# Patient Record
Sex: FEMALE | Race: BLACK OR AFRICAN AMERICAN | Employment: UNEMPLOYED | URBAN - METROPOLITAN AREA
[De-identification: names, ages, dates, MRNs, and addresses within clinical notes are randomized per-mention and may not be internally consistent; named-entity substitution may affect disease eponyms.]

---

## 2017-03-16 ENCOUNTER — ED HISTORICAL/CONVERTED ENCOUNTER (OUTPATIENT)
Dept: OTHER | Age: 47
End: 2017-03-16

## 2017-04-03 ENCOUNTER — ED HISTORICAL/CONVERTED ENCOUNTER (OUTPATIENT)
Dept: OTHER | Age: 47
End: 2017-04-03

## 2020-07-25 ENCOUNTER — ED HISTORICAL/CONVERTED ENCOUNTER (OUTPATIENT)
Dept: OTHER | Age: 50
End: 2020-07-25

## 2021-03-02 ENCOUNTER — APPOINTMENT (OUTPATIENT)
Dept: GENERAL RADIOLOGY | Age: 51
End: 2021-03-02
Attending: FAMILY MEDICINE
Payer: MEDICARE

## 2021-03-02 ENCOUNTER — HOSPITAL ENCOUNTER (EMERGENCY)
Age: 51
Discharge: HOME | End: 2021-03-02
Attending: FAMILY MEDICINE
Payer: MEDICARE

## 2021-03-02 VITALS
DIASTOLIC BLOOD PRESSURE: 91 MMHG | SYSTOLIC BLOOD PRESSURE: 135 MMHG | BODY MASS INDEX: 21.19 KG/M2 | TEMPERATURE: 97.9 F | HEIGHT: 67 IN | HEART RATE: 91 BPM | WEIGHT: 135 LBS | OXYGEN SATURATION: 100 % | RESPIRATION RATE: 18 BRPM

## 2021-03-02 DIAGNOSIS — S80.01XA CONTUSION OF RIGHT KNEE, INITIAL ENCOUNTER: Primary | ICD-10-CM

## 2021-03-02 PROCEDURE — 99282 EMERGENCY DEPT VISIT SF MDM: CPT

## 2021-03-02 PROCEDURE — 73562 X-RAY EXAM OF KNEE 3: CPT

## 2021-03-02 RX ORDER — BISMUTH SUBSALICYLATE 262 MG
1 TABLET,CHEWABLE ORAL DAILY
COMMUNITY

## 2021-03-03 NOTE — ED PROVIDER NOTES
EMERGENCY DEPARTMENT HISTORY AND PHYSICAL EXAM      Date: 3/2/2021  Patient Name: Erik Sofia    History of Presenting Illness     Chief Complaint   Patient presents with    Knee Pain       History Provided By:     HPI: Erik Sofia, is an extremely pleasant 45 y.o. female presenting to the ED with a chief complaint of right knee pain. She states she has a history of arthritis and recently injured her knee. She states she tripped over one of her grandkids and fell down onto her right knee. Since this time she has had pain with walking. Its painful to flex her knee. She states it is a little bit swollen. She denies any other injuries. She did not hit her head. She otherwise feels well. There are no other complaints, changes, or physical findings at this time. PCP: Other, MD Dana    No current facility-administered medications on file prior to encounter. Current Outpatient Medications on File Prior to Encounter   Medication Sig Dispense Refill    multivitamin (ONE A DAY) tablet Take 1 Tab by mouth daily. Past History     Past Medical History:  Past Medical History:   Diagnosis Date    Ovarian cancer [facility])        Past Surgical History:  Past Surgical History:   Procedure Laterality Date    HX HYSTERECTOMY      HX OOPHORECTOMY         Family History:  No family history on file. Social History:  Social History     Tobacco Use    Smoking status: Current Every Day Smoker     Packs/day: 0.50    Smokeless tobacco: Never Used   Substance Use Topics    Alcohol use: Yes     Comment: occasional    Drug use: Yes     Types: Marijuana       Allergies:  No Known Allergies      Review of Systems       Review of Systems   Constitutional: Negative for activity change, appetite change, chills, fatigue and fever. HENT: Negative for congestion and sore throat. Eyes: Negative for photophobia and visual disturbance. Respiratory: Negative for cough, shortness of breath and wheezing. Cardiovascular: Negative for chest pain, palpitations and leg swelling. Gastrointestinal: Negative for abdominal pain, diarrhea, nausea and vomiting. Endocrine: Negative for cold intolerance and heat intolerance. Musculoskeletal: Positive for gait problem and joint swelling. Right knee pain   Skin: Negative for color change and rash. Neurological: Negative for dizziness and headaches. Physical Exam       Physical Exam  Constitutional:       Appearance: She is well-developed. HENT:      Head: Normocephalic and atraumatic. Mouth/Throat:      Mouth: Mucous membranes are moist.      Pharynx: Oropharynx is clear. Eyes:      Conjunctiva/sclera: Conjunctivae normal.      Pupils: Pupils are equal, round, and reactive to light. Neck:      Musculoskeletal: Normal range of motion and neck supple. Cardiovascular:      Rate and Rhythm: Normal rate and regular rhythm. Heart sounds: No murmur. Pulmonary:      Effort: No respiratory distress. Breath sounds: No stridor. No wheezing, rhonchi or rales. Abdominal:      General: There is no distension. Tenderness: There is no abdominal tenderness. There is no rebound. Musculoskeletal:      Comments: Decreased range of motion at the right knee secondary to pain. She does have tenderness over the patella and the proximal tibia. ACL and PCL testing negative. Skin:     General: Skin is warm and dry. Neurological:      General: No focal deficit present. Mental Status: She is alert and oriented to person, place, and time. Psychiatric:         Mood and Affect: Mood normal.         Behavior: Behavior normal.         Lab and Diagnostic Study Results     Labs -   No results found for this or any previous visit (from the past 12 hour(s)).     Radiologic Studies -   @lastxrresult@  CT Results  (Last 48 hours)    None        CXR Results  (Last 48 hours)    None            Medical Decision Making   - I am the first provider for this patient. - I reviewed the vital signs, available nursing notes, past medical history, past surgical history, family history and social history. - Initial assessment performed. The patients presenting problems have been discussed, and they are in agreement with the care plan formulated and outlined with them. I have encouraged them to ask questions as they arise throughout their visit. Vital Signs-Reviewed the patient's vital signs. Patient Vitals for the past 12 hrs:   Temp Pulse Resp BP SpO2   03/02/21 1918 97.9 °F (36.6 °C) 91 18 (!) 135/91 100 %         ED Course/ Provider Notes (Medical Decision Making):     Patient presented to the emergency department with a chief complaint of right knee pain. X-ray showed no acute fracture or dislocation. She was discharged home with supportive measures for knee contusion            Ashley Chapman was given a thorough list of signs and symptoms that would warrant an immediate return to the emergency department. Otherwise Ashley Chapman will follow up with PCP. Procedures   Medical Decision Makingedical Decision Making  Performed by: Elie Prabhakar DO  Procedures  None       Disposition   Disposition:     Home       All of the diagnostic tests were reviewed and questions answered. Diagnosis, care plan and treatment options were discussed. The patient understands the instructions and will follow up as directed. The patients results have been reviewed with them. They have been counseled regarding their diagnosis. The patient verbally convey understanding and agreement of the signs, symptoms, diagnosis, treatment and prognosis and additionally agrees to follow up as recommended with their PCP in 24 - 48 hours. They also agree with the care-plan and convey that all of their questions have been answered.   I have also put together some discharge instructions for them that include: 1) educational information regarding their diagnosis, 2) how to care for their diagnosis at home, as well a 3) list of reasons why they would want to return to the ED prior to their follow-up appointment, should their condition change. DISCHARGE PLAN:    1. Current Discharge Medication List      CONTINUE these medications which have NOT CHANGED    Details   multivitamin (ONE A DAY) tablet Take 1 Tab by mouth daily. 2.   Follow-up Information     Follow up With Specialties Details Why Contact Info    PCP                3.  Return to ED if worse       4. Current Discharge Medication List            Diagnosis     Clinical Impression:      1. Contusion of right knee, initial encounter        Attestations:    Ngozi Gonzalez, DO    Please note that this dictation was completed with [employer], the computer voice recognition software. Quite often unanticipated grammatical, syntax, homophones, and other interpretive errors are inadvertently transcribed by the computer software. Please disregard these errors. Please excuse any errors that have escaped final proofreading. Thank you.

## 2021-03-03 NOTE — DISCHARGE INSTRUCTIONS
Thank you! Thank you for allowing me to care for you in the emergency department. I sincerely hope that you are satisfied with your visit today. It is my goal to provide you with excellent care. Below you will find a list of your labs and imaging from your visit today. Should you have any questions regarding these results please do not hesitate to call the emergency department. Labs -   No results found for this or any previous visit (from the past 12 hour(s)). Radiologic Studies -   XR KNEE RT 3 V   Final Result   Negative bony examination right knee. CT Results  (Last 48 hours)      None          CXR Results  (Last 48 hours)      None               If you feel that you have not received excellent quality care or timely care, please ask to speak to the nurse manager. Please choose us in the future for your continued health care needs. ------------------------------------------------------------------------------------------------------------  The exam and treatment you received in the Emergency Department were for an urgent problem and are not intended as complete care. It is important that you follow-up with a doctor, nurse practitioner, or physician assistant to:  (1) confirm your diagnosis,  (2) re-evaluation of changes in your illness and treatment, and  (3) for ongoing care. If your symptoms become worse or you do not improve as expected and you are unable to reach your usual health care provider, you should return to the Emergency Department. We are available 24 hours a day. Please take your discharge instructions with you when you go to your follow-up appointment. If you have any problem arranging a follow-up appointment, contact the Emergency Department immediately. If a prescription has been provided, please have it filled as soon as possible to prevent a delay in treatment. Read the entire medication instruction sheet provided to you by the pharmacy.  If you have any questions or reservations about taking the medication due to side effects or interactions with other medications, please call your primary care physician or contact the ER to speak with the charge nurse. Make an appointment with your family doctor or the physician you were referred to for follow-up of this visit as instructed on your discharge paperwork, as this is a mandatory follow-up. Return to the ER if you are unable to be seen or if you are unable to be seen in a timely manner. If you have any problem arranging the follow-up visit, contact the Emergency Department immediately.

## 2021-03-03 NOTE — ED TRIAGE NOTES
Pt states fell tripping over grand daughter 3 days ago, having right knee pain and swelling reported

## 2022-06-01 ENCOUNTER — HOSPITAL ENCOUNTER (EMERGENCY)
Age: 52
Discharge: HOME | DRG: 247 | End: 2022-06-01
Attending: EMERGENCY MEDICINE
Payer: MEDICAID

## 2022-06-01 ENCOUNTER — APPOINTMENT (OUTPATIENT)
Dept: GENERAL RADIOLOGY | Age: 52
DRG: 247 | End: 2022-06-01
Attending: EMERGENCY MEDICINE
Payer: MEDICAID

## 2022-06-01 VITALS
DIASTOLIC BLOOD PRESSURE: 86 MMHG | TEMPERATURE: 98 F | OXYGEN SATURATION: 98 % | BODY MASS INDEX: 21.19 KG/M2 | HEART RATE: 86 BPM | WEIGHT: 135 LBS | SYSTOLIC BLOOD PRESSURE: 116 MMHG | RESPIRATION RATE: 18 BRPM | HEIGHT: 67 IN

## 2022-06-01 DIAGNOSIS — N39.0 URINARY TRACT INFECTION WITHOUT HEMATURIA, SITE UNSPECIFIED: ICD-10-CM

## 2022-06-01 DIAGNOSIS — K52.9 ENTERITIS: Primary | ICD-10-CM

## 2022-06-01 LAB
ALBUMIN SERPL-MCNC: 4.4 G/DL (ref 3.5–5)
ALBUMIN/GLOB SERPL: 1.2 {RATIO} (ref 1.1–2.2)
ALP SERPL-CCNC: 98 U/L (ref 45–117)
ALT SERPL-CCNC: 21 U/L (ref 12–78)
ANION GAP SERPL CALC-SCNC: 9 MMOL/L (ref 5–15)
APPEARANCE UR: ABNORMAL
AST SERPL W P-5'-P-CCNC: 16 U/L (ref 15–37)
BACTERIA URNS QL MICRO: ABNORMAL /HPF
BASOPHILS # BLD: 0 K/UL (ref 0–0.1)
BASOPHILS NFR BLD: 1 % (ref 0–1)
BILIRUB SERPL-MCNC: 0.9 MG/DL (ref 0.2–1)
BILIRUB UR QL: NEGATIVE
BUN SERPL-MCNC: 16 MG/DL (ref 6–20)
BUN/CREAT SERPL: 16 (ref 12–20)
CA-I BLD-MCNC: 11.1 MG/DL (ref 8.5–10.1)
CHLORIDE SERPL-SCNC: 101 MMOL/L (ref 97–108)
CO2 SERPL-SCNC: 29 MMOL/L (ref 21–32)
COLOR UR: ABNORMAL
CREAT SERPL-MCNC: 0.97 MG/DL (ref 0.55–1.02)
DIFFERENTIAL METHOD BLD: ABNORMAL
EOSINOPHIL # BLD: 0.1 K/UL (ref 0–0.4)
EOSINOPHIL NFR BLD: 1 % (ref 0–7)
EPITH CASTS URNS QL MICRO: ABNORMAL /LPF
ERYTHROCYTE [DISTWIDTH] IN BLOOD BY AUTOMATED COUNT: 12.5 % (ref 11.5–14.5)
FLUAV AG NPH QL IA: NEGATIVE
FLUBV AG NOSE QL IA: NEGATIVE
GLOBULIN SER CALC-MCNC: 3.7 G/DL (ref 2–4)
GLUCOSE SERPL-MCNC: 103 MG/DL (ref 65–100)
GLUCOSE UR STRIP.AUTO-MCNC: NEGATIVE MG/DL
HCT VFR BLD AUTO: 48.7 % (ref 35–47)
HGB BLD-MCNC: 16 G/DL (ref 11.5–16)
HGB UR QL STRIP: ABNORMAL
IMM GRANULOCYTES # BLD AUTO: 0 K/UL (ref 0–0.04)
IMM GRANULOCYTES NFR BLD AUTO: 0 % (ref 0–0.5)
KETONES UR QL STRIP.AUTO: 15 MG/DL
LACTATE SERPL-SCNC: 0.7 MMOL/L (ref 0.4–2)
LEUKOCYTE ESTERASE UR QL STRIP.AUTO: ABNORMAL
LIPASE SERPL-CCNC: 73 U/L (ref 73–393)
LYMPHOCYTES # BLD: 2 K/UL (ref 0.8–3.5)
LYMPHOCYTES NFR BLD: 32 % (ref 12–49)
MCH RBC QN AUTO: 32.1 PG (ref 26–34)
MCHC RBC AUTO-ENTMCNC: 32.9 G/DL (ref 30–36.5)
MCV RBC AUTO: 97.6 FL (ref 80–99)
MONOCYTES # BLD: 0.6 K/UL (ref 0–1)
MONOCYTES NFR BLD: 10 % (ref 5–13)
MUCOUS THREADS URNS QL MICRO: ABNORMAL /LPF
NEUTS SEG # BLD: 3.6 K/UL (ref 1.8–8)
NEUTS SEG NFR BLD: 56 % (ref 32–75)
NITRITE UR QL STRIP.AUTO: NEGATIVE
PH UR STRIP: 5 [PH] (ref 5–8)
PLATELET # BLD AUTO: 169 K/UL (ref 150–400)
PMV BLD AUTO: 10.9 FL (ref 8.9–12.9)
POTASSIUM SERPL-SCNC: 3.9 MMOL/L (ref 3.5–5.1)
PROT SERPL-MCNC: 8.1 G/DL (ref 6.4–8.2)
PROT UR STRIP-MCNC: NEGATIVE MG/DL
RBC # BLD AUTO: 4.99 M/UL (ref 3.8–5.2)
RBC #/AREA URNS HPF: ABNORMAL /HPF (ref 0–5)
SODIUM SERPL-SCNC: 139 MMOL/L (ref 136–145)
SP GR UR REFRACTOMETRY: 1.02 (ref 1–1.03)
TRICHOMONAS UR QL MICRO: PRESENT
UROBILINOGEN UR QL STRIP.AUTO: 1 EU/DL (ref 0.2–1)
WBC # BLD AUTO: 6.4 K/UL (ref 3.6–11)
WBC URNS QL MICRO: ABNORMAL /HPF (ref 0–4)

## 2022-06-01 PROCEDURE — 96374 THER/PROPH/DIAG INJ IV PUSH: CPT

## 2022-06-01 PROCEDURE — 83690 ASSAY OF LIPASE: CPT

## 2022-06-01 PROCEDURE — 74011000250 HC RX REV CODE- 250: Performed by: EMERGENCY MEDICINE

## 2022-06-01 PROCEDURE — 99284 EMERGENCY DEPT VISIT MOD MDM: CPT

## 2022-06-01 PROCEDURE — 81001 URINALYSIS AUTO W/SCOPE: CPT

## 2022-06-01 PROCEDURE — 74011250636 HC RX REV CODE- 250/636: Performed by: EMERGENCY MEDICINE

## 2022-06-01 PROCEDURE — 83605 ASSAY OF LACTIC ACID: CPT

## 2022-06-01 PROCEDURE — 74022 RADEX COMPL AQT ABD SERIES: CPT

## 2022-06-01 PROCEDURE — C9113 INJ PANTOPRAZOLE SODIUM, VIA: HCPCS | Performed by: EMERGENCY MEDICINE

## 2022-06-01 PROCEDURE — 80053 COMPREHEN METABOLIC PANEL: CPT

## 2022-06-01 PROCEDURE — 87804 INFLUENZA ASSAY W/OPTIC: CPT

## 2022-06-01 PROCEDURE — 85025 COMPLETE CBC W/AUTO DIFF WBC: CPT

## 2022-06-01 RX ORDER — VENLAFAXINE HYDROCHLORIDE 37.5 MG/1
37.5 CAPSULE, EXTENDED RELEASE ORAL DAILY
COMMUNITY
Start: 2022-01-19

## 2022-06-01 RX ORDER — SULFAMETHOXAZOLE AND TRIMETHOPRIM 800; 160 MG/1; MG/1
1 TABLET ORAL 2 TIMES DAILY
Qty: 14 TABLET | Refills: 0 | Status: SHIPPED | OUTPATIENT
Start: 2022-06-01 | End: 2022-06-12

## 2022-06-01 RX ORDER — OMEPRAZOLE 20 MG/1
20 CAPSULE, DELAYED RELEASE ORAL DAILY
Qty: 10 CAPSULE | Refills: 0 | Status: SHIPPED | OUTPATIENT
Start: 2022-06-01

## 2022-06-01 RX ORDER — ONDANSETRON 4 MG/1
4 TABLET, FILM COATED ORAL
Qty: 10 TABLET | Refills: 0 | Status: SHIPPED | OUTPATIENT
Start: 2022-06-01 | End: 2022-07-24

## 2022-06-01 RX ORDER — POLYETHYLENE GLYCOL 3350, SODIUM SULFATE ANHYDROUS, SODIUM BICARBONATE, SODIUM CHLORIDE, POTASSIUM CHLORIDE 236; 22.74; 6.74; 5.86; 2.97 G/4L; G/4L; G/4L; G/4L; G/4L
POWDER, FOR SOLUTION ORAL
COMMUNITY
Start: 2022-03-30 | End: 2022-06-12

## 2022-06-01 RX ORDER — GABAPENTIN 100 MG/1
100 CAPSULE ORAL DAILY
COMMUNITY
Start: 2022-01-19

## 2022-06-01 RX ORDER — AMLODIPINE BESYLATE 5 MG/1
1 TABLET ORAL DAILY
COMMUNITY
Start: 2022-04-04

## 2022-06-01 RX ADMIN — SODIUM CHLORIDE 1000 ML: 9 INJECTION, SOLUTION INTRAVENOUS at 10:35

## 2022-06-01 RX ADMIN — SODIUM CHLORIDE, PRESERVATIVE FREE 40 MG: 5 INJECTION INTRAVENOUS at 11:20

## 2022-06-01 NOTE — ED TRIAGE NOTES
\" I started with some abdominal pain Sunday evening, and then have been throwing up off and on Monday and Tuesday, no vomiting today, but cramping pain in the belly, and have not been able to eat \"

## 2022-06-01 NOTE — ED PROVIDER NOTES
EMERGENCY DEPARTMENT HISTORY AND PHYSICAL EXAM      Date: 6/1/2022  Patient Name: Candy Galo      History of Presenting Illness     Chief Complaint   Patient presents with    Abdominal Pain    Dizziness    Vomiting    Fatigue       History Provided By: Patient    HPI: Candy Galo, 45 y.o. female with a past medical history significant hypertension presents to the ED with cc of Abdominal pain, NV started 3 days ago. No fever, chills, hematemesis or hematochezia. LBM 3 days ago was small but normal. No nausea or vomiting today. There are no other complaints, changes, or physical findings at this time. PCP: Dana Pulido MD    Current Outpatient Medications   Medication Sig Dispense Refill    amLODIPine (NORVASC) 5 mg tablet Take 1 Tablet by mouth daily.  gabapentin (NEURONTIN) 100 mg capsule Take 100 mg by mouth daily.  venlafaxine-SR (EFFEXOR-XR) 37.5 mg capsule Take 37.5 mg by mouth daily.  PEG 3350-Electrolytes (GO-LYTELY) 236-22.74-6.74 -5.86 gram suspension Follow [facility] written instructions      trimethoprim-sulfamethoxazole (Bactrim DS) 160-800 mg per tablet Take 1 Tablet by mouth two (2) times a day for 7 days. 14 Tablet 0    ondansetron hcl (ZOFRAN) 4 mg tablet Take 1 Tablet by mouth every eight (8) hours as needed for Nausea, Vomiting or Nausea or Vomiting. 10 Tablet 0    omeprazole (PRILOSEC) 20 mg capsule Take 1 Capsule by mouth daily. 10 Capsule 0    multivitamin (ONE A DAY) tablet Take 1 Tab by mouth daily. Past History     Past Medical History:  Past Medical History:   Diagnosis Date    Hypertension     Ovarian cancer ([facility] [address]        Past Surgical History:  Past Surgical History:   Procedure Laterality Date    HX HYSTERECTOMY      HX OOPHORECTOMY         Family History:  History reviewed. No pertinent family history.     Social History:  Social History     Tobacco Use    Smoking status: Current Every Day Smoker     Packs/day: 0.50    Smokeless tobacco: Never Used   Substance Use Topics    Alcohol use: Yes     Comment: occasional    Drug use: Yes     Types: Marijuana     Comment: occasionally       Allergies:  No Known Allergies      Review of Systems     Review of Systems   Constitutional: Negative. HENT: Negative. Respiratory: Negative. Cardiovascular: Negative. Gastrointestinal: Positive for abdominal pain, nausea and vomiting. Genitourinary: Negative. Musculoskeletal: Negative. Neurological: Negative. All other systems reviewed and are negative. Physical Exam     Physical Exam  Vitals and nursing note reviewed. Constitutional:       Appearance: She is well-developed. HENT:      Head: Normocephalic. Cardiovascular:      Heart sounds: Normal heart sounds. Pulmonary:      Effort: Pulmonary effort is normal.   Abdominal:      General: Abdomen is flat and scaphoid. Palpations: Abdomen is soft. Tenderness: There is abdominal tenderness in the epigastric area. Neurological:      General: No focal deficit present. Mental Status: She is alert and oriented to person, place, and time. Lab and Diagnostic Study Results     Labs -     Recent Results (from the past 12 hour(s))   CBC WITH AUTOMATED DIFF    Collection Time: 06/01/22 10:35 AM   Result Value Ref Range    WBC 6.4 3.6 - 11.0 K/uL    RBC 4.99 3.80 - 5.20 M/uL    HGB 16.0 11.5 - 16.0 g/dL    HCT 48.7 (H) 35.0 - 47.0 %    MCV 97.6 80.0 - 99.0 FL    MCH 32.1 26.0 - 34.0 PG    MCHC 32.9 30.0 - 36.5 g/dL    RDW 12.5 11.5 - 14.5 %    PLATELET 628 937 - 472 K/uL    MPV 10.9 8.9 - 12.9 FL    NEUTROPHILS 56 32 - 75 %    LYMPHOCYTES 32 12 - 49 %    MONOCYTES 10 5 - 13 %    EOSINOPHILS 1 0 - 7 %    BASOPHILS 1 0 - 1 %    IMMATURE GRANULOCYTES 0 0.0 - 0.5 %    ABS. NEUTROPHILS 3.6 1.8 - 8.0 K/UL    ABS. LYMPHOCYTES 2.0 0.8 - 3.5 K/UL    ABS. MONOCYTES 0.6 0.0 - 1.0 K/UL    ABS. EOSINOPHILS 0.1 0.0 - 0.4 K/UL    ABS. BASOPHILS 0.0 0.0 - 0.1 K/UL    ABS. IMM. GRANS. 0.0 0.00 - 0.04 K/UL    DF AUTOMATED     METABOLIC PANEL, COMPREHENSIVE    Collection Time: 06/01/22 10:35 AM   Result Value Ref Range    Sodium 139 136 - 145 mmol/L    Potassium 3.9 3.5 - 5.1 mmol/L    Chloride 101 97 - 108 mmol/L    CO2 29 21 - 32 mmol/L    Anion gap 9 5 - 15 mmol/L    Glucose 103 (H) 65 - 100 mg/dL    BUN 16 6 - 20 mg/dL    Creatinine 0.97 0.55 - 1.02 mg/dL    BUN/Creatinine ratio 16 12 - 20      GFR est AA >60 >60 ml/min/1.73m2    GFR est non-AA >60 >60 ml/min/1.73m2    Calcium 11.1 (H) 8.5 - 10.1 mg/dL    Bilirubin, total 0.9 0.2 - 1.0 mg/dL    AST (SGOT) 16 15 - 37 U/L    ALT (SGPT) 21 12 - 78 U/L    Alk.  phosphatase 98 45 - 117 U/L    Protein, total 8.1 6.4 - 8.2 g/dL    Albumin 4.4 3.5 - 5.0 g/dL    Globulin 3.7 2.0 - 4.0 g/dL    A-G Ratio 1.2 1.1 - 2.2     INFLUENZA A & B AG (RAPID TEST)    Collection Time: 06/01/22 10:35 AM   Result Value Ref Range    Influenza A Antigen Negative Negative      Influenza B Antigen Negative Negative     LIPASE    Collection Time: 06/01/22 11:30 AM   Result Value Ref Range    Lipase 73 73 - 393 U/L   LACTIC ACID    Collection Time: 06/01/22 11:30 AM   Result Value Ref Range    Lactic acid 0.7 0.4 - 2.0 mmol/L   URINALYSIS W/ RFLX MICROSCOPIC    Collection Time: 06/01/22 11:50 AM   Result Value Ref Range    Color Dark Yellow      Appearance Hazy (A) Clear      Specific gravity 1.020 1.003 - 1.030      pH (UA) 5.0 5.0 - 8.0      Protein Negative Negative mg/dL    Glucose Negative Negative mg/dL    Ketone 15 (A) Negative mg/dL    Bilirubin Negative Negative      Blood Trace (A) Negative      Urobilinogen 1.0 0.2 - 1.0 EU/dL    Nitrites Negative Negative      Leukocyte Esterase Large (A) Negative     URINE MICROSCOPIC    Collection Time: 06/01/22 11:50 AM   Result Value Ref Range    WBC 5-10 0 - 4 /hpf    RBC 0-5 0 - 5 /hpf    Epithelial cells Moderate (A) Few /lpf    Bacteria 1+ (A) Negative /hpf    Mucus 2+ (A) Negative /lpf    Trichomonas Present Radiologic Studies -   [unfilled]  CT Results  (Last 48 hours)    None        CXR Results  (Last 48 hours)               06/01/22 1146  XR ABD ACUTE W 1 V CHEST Final result    Narrative:  Abdomen series and chest, 3 views       Staple central abdomen, correlate to prior surgery. Bowel gas pattern unremarkable. On upright imaging, there is no free intraperitoneal air. Single view of the chest accompanying this study appears unremarkable. Medical Decision Making and ED Course   - I am the first and primary provider for this patient AND AM THE PRIMARY PROVIDER OF RECORD. - I reviewed the vital signs, available nursing notes, past medical history, past surgical history, family history and social history. - Initial assessment performed. The patients presenting problems have been discussed, and the staff are in agreement with the care plan formulated and outlined with them. I have encouraged them to ask questions as they arise throughout their visit. Vital Signs-Reviewed the patient's vital signs. Patient Vitals for the past 12 hrs:   Temp Pulse Resp BP SpO2   06/01/22 0947 98 °F (36.7 °C) 86 18 116/86 98 %           Records Reviewed: Nursing Notes    The patient presents with     ED Course:              Provider Notes (Medical Decision Making):     MDM  Number of Diagnoses or Management Options  Risk of Complications, Morbidity, and/or Mortality  General comments: CT scanner is down. I offered to have her transported to the main radiology for CT scan and back here but she refused. She greed to plain Xrays only. She may change her mind anytime she likes.                Consultations:       Consultations:         Procedures and Critical Care       Performed by: Lucho Ramos MD  PROCEDURES:  Procedures                     Disposition     Disposition: [facility] Discharge: I informed the pt that they needed further workup CT scan for adequate evaluation for their Abd pain and that by refusing the above, they at risk for further deterioration. They are awake, alert, and they understand their condition and the risks involved in leaving. They are clinically aware of their surroundings and able to ask appropriate questions. The patient and the nurse present confirms They are not clinically intoxicated and able to make medical decisions. They have verbalized knowing the risks and understood it was recommended that they stay and could also return at any time. The patient' was present for the discussion and also verbalized that they understood both diagnosis, risks and could return at any time. They were both provided with warnings regarding worsening of Their condition and were provided instructions to follow up with their PCP tomorrow or return to the Emergency Room as soon as possible. This discussion was witnessed by nurse RN. Discharged    Remove if not discharged  DISCHARGE PLAN:  1. Current Discharge Medication List      CONTINUE these medications which have NOT CHANGED    Details   amLODIPine (NORVASC) 5 mg tablet Take 1 Tablet by mouth daily. gabapentin (NEURONTIN) 100 mg capsule Take 100 mg by mouth daily. venlafaxine-SR (EFFEXOR-XR) 37.5 mg capsule Take 37.5 mg by mouth daily. PEG 3350-Electrolytes (GO-LYTELY) 236-22.74-6.74 -5.86 gram suspension [facility]      multivitamin (ONE A DAY) tablet Take 1 Tab by mouth daily. 2.   Follow-up Information     Follow up With Specialties Details Why Contact Info    Tory Hill MD Internal Medicine Physician In 1 week  [address] [facility]  [phone]          3. Return to ED if worse   4. Current Discharge Medication List      START taking these medications    Details   trimethoprim-sulfamethoxazole (Bactrim DS) 160-800 mg per tablet Take 1 Tablet by mouth two (2) times a day for 7 days.   Qty: 14 Tablet, Refills: 0  Start date: 6/1/2022, End date: 6/8/2022 ondansetron hcl (ZOFRAN) 4 mg tablet Take 1 Tablet by mouth every eight (8) hours as needed for Nausea, Vomiting or Nausea or Vomiting. Qty: 10 Tablet, Refills: 0  Start date: 6/1/2022      omeprazole (PRILOSEC) 20 mg capsule Take 1 Capsule by mouth daily. Qty: 10 Capsule, Refills: 0  Start date: 6/1/2022             Diagnosis     Clinical Impression:   1. Enteritis    2. Urinary tract infection without hematuria, site unspecified        Attestations:    Nhan Alonzo MD    Please note that this dictation was completed with [employer], the computer voice recognition software. Quite often unanticipated grammatical, syntax, homophones, and other interpretive errors are inadvertently transcribed by the computer software. Please disregard these errors. Please excuse any errors that have escaped final proofreading. Thank you.

## 2022-06-04 ENCOUNTER — HOSPITAL ENCOUNTER (INPATIENT)
Age: 52
LOS: 8 days | Discharge: HOME | DRG: 247 | End: 2022-06-12
Attending: EMERGENCY MEDICINE | Admitting: INTERNAL MEDICINE
Payer: MEDICAID

## 2022-06-04 ENCOUNTER — APPOINTMENT (OUTPATIENT)
Dept: CT IMAGING | Age: 52
DRG: 247 | End: 2022-06-04
Attending: EMERGENCY MEDICINE
Payer: MEDICAID

## 2022-06-04 DIAGNOSIS — K56.609 SBO (SMALL BOWEL OBSTRUCTION) (HCC): Primary | ICD-10-CM

## 2022-06-04 DIAGNOSIS — R10.10 PAIN OF UPPER ABDOMEN: ICD-10-CM

## 2022-06-04 DIAGNOSIS — N39.0 URINARY TRACT INFECTION WITHOUT HEMATURIA, SITE UNSPECIFIED: ICD-10-CM

## 2022-06-04 DIAGNOSIS — C56.9 MALIGNANT NEOPLASM OF OVARY, UNSPECIFIED LATERALITY (HCC): ICD-10-CM

## 2022-06-04 DIAGNOSIS — A59.01 TRICHOMONAS VAGINITIS: ICD-10-CM

## 2022-06-04 LAB
ALBUMIN SERPL-MCNC: 5 G/DL (ref 3.5–5)
ALBUMIN/GLOB SERPL: 1.2 {RATIO} (ref 1.1–2.2)
ALP SERPL-CCNC: 113 U/L (ref 45–117)
ALT SERPL-CCNC: 23 U/L (ref 12–78)
ANION GAP SERPL CALC-SCNC: 11 MMOL/L (ref 5–15)
APPEARANCE UR: ABNORMAL
AST SERPL W P-5'-P-CCNC: 16 U/L (ref 15–37)
BACTERIA URNS QL MICRO: ABNORMAL /HPF
BASOPHILS # BLD: 0 K/UL (ref 0–0.1)
BASOPHILS NFR BLD: 0 % (ref 0–1)
BILIRUB SERPL-MCNC: 0.8 MG/DL (ref 0.2–1)
BILIRUB UR QL: ABNORMAL
BUN SERPL-MCNC: 14 MG/DL (ref 6–20)
BUN/CREAT SERPL: 13 (ref 12–20)
CA-I BLD-MCNC: 11.2 MG/DL (ref 8.5–10.1)
CHLORIDE SERPL-SCNC: 99 MMOL/L (ref 97–108)
CO2 SERPL-SCNC: 28 MMOL/L (ref 21–32)
COLOR UR: ABNORMAL
CREAT SERPL-MCNC: 1.07 MG/DL (ref 0.55–1.02)
DIFFERENTIAL METHOD BLD: ABNORMAL
EOSINOPHIL # BLD: 0 K/UL (ref 0–0.4)
EOSINOPHIL NFR BLD: 1 % (ref 0–7)
EPITH CASTS URNS QL MICRO: ABNORMAL /LPF
ERYTHROCYTE [DISTWIDTH] IN BLOOD BY AUTOMATED COUNT: 12.1 % (ref 11.5–14.5)
GLOBULIN SER CALC-MCNC: 4.2 G/DL (ref 2–4)
GLUCOSE SERPL-MCNC: 111 MG/DL (ref 65–100)
GLUCOSE UR STRIP.AUTO-MCNC: NEGATIVE MG/DL
HCT VFR BLD AUTO: 50.2 % (ref 35–47)
HGB BLD-MCNC: 16.9 G/DL (ref 11.5–16)
HGB UR QL STRIP: ABNORMAL
IMM GRANULOCYTES # BLD AUTO: 0 K/UL (ref 0–0.04)
IMM GRANULOCYTES NFR BLD AUTO: 0 % (ref 0–0.5)
KETONES UR QL STRIP.AUTO: 15 MG/DL
LACTATE SERPL-SCNC: 1.2 MMOL/L (ref 0.4–2)
LEUKOCYTE ESTERASE UR QL STRIP.AUTO: ABNORMAL
LYMPHOCYTES # BLD: 1.9 K/UL (ref 0.8–3.5)
LYMPHOCYTES NFR BLD: 34 % (ref 12–49)
MCH RBC QN AUTO: 32 PG (ref 26–34)
MCHC RBC AUTO-ENTMCNC: 33.7 G/DL (ref 30–36.5)
MCV RBC AUTO: 95.1 FL (ref 80–99)
MONOCYTES # BLD: 1.1 K/UL (ref 0–1)
MONOCYTES NFR BLD: 19 % (ref 5–13)
NEUTS SEG # BLD: 2.6 K/UL (ref 1.8–8)
NEUTS SEG NFR BLD: 46 % (ref 32–75)
NITRITE UR QL STRIP.AUTO: NEGATIVE
PH UR STRIP: 5 [PH] (ref 5–8)
PLATELET # BLD AUTO: 190 K/UL (ref 150–400)
PMV BLD AUTO: 11.2 FL (ref 8.9–12.9)
POTASSIUM SERPL-SCNC: 4.2 MMOL/L (ref 3.5–5.1)
PROT SERPL-MCNC: 9.2 G/DL (ref 6.4–8.2)
PROT UR STRIP-MCNC: 30 MG/DL
RBC # BLD AUTO: 5.28 M/UL (ref 3.8–5.2)
RBC #/AREA URNS HPF: ABNORMAL /HPF (ref 0–5)
SODIUM SERPL-SCNC: 138 MMOL/L (ref 136–145)
SP GR UR REFRACTOMETRY: 1.02 (ref 1–1.03)
UA: UC IF INDICATED,UAUC: ABNORMAL
UROBILINOGEN UR QL STRIP.AUTO: 4 EU/DL (ref 0.2–1)
WBC # BLD AUTO: 5.7 K/UL (ref 3.6–11)
WBC URNS QL MICRO: ABNORMAL /HPF (ref 0–4)

## 2022-06-04 PROCEDURE — 99222 1ST HOSP IP/OBS MODERATE 55: CPT | Performed by: SURGERY

## 2022-06-04 PROCEDURE — 96374 THER/PROPH/DIAG INJ IV PUSH: CPT

## 2022-06-04 PROCEDURE — 74011250636 HC RX REV CODE- 250/636: Performed by: INTERNAL MEDICINE

## 2022-06-04 PROCEDURE — 74011250636 HC RX REV CODE- 250/636: Performed by: EMERGENCY MEDICINE

## 2022-06-04 PROCEDURE — 99285 EMERGENCY DEPT VISIT HI MDM: CPT

## 2022-06-04 PROCEDURE — 83605 ASSAY OF LACTIC ACID: CPT

## 2022-06-04 PROCEDURE — 74011000636 HC RX REV CODE- 636: Performed by: EMERGENCY MEDICINE

## 2022-06-04 PROCEDURE — 81001 URINALYSIS AUTO W/SCOPE: CPT

## 2022-06-04 PROCEDURE — 74011250636 HC RX REV CODE- 250/636: Performed by: SURGERY

## 2022-06-04 PROCEDURE — 85025 COMPLETE CBC W/AUTO DIFF WBC: CPT

## 2022-06-04 PROCEDURE — 87086 URINE CULTURE/COLONY COUNT: CPT

## 2022-06-04 PROCEDURE — 80053 COMPREHEN METABOLIC PANEL: CPT

## 2022-06-04 PROCEDURE — 74177 CT ABD & PELVIS W/CONTRAST: CPT

## 2022-06-04 PROCEDURE — 74011000250 HC RX REV CODE- 250: Performed by: INTERNAL MEDICINE

## 2022-06-04 PROCEDURE — 65270000029 HC RM PRIVATE

## 2022-06-04 PROCEDURE — 0DH67UZ INSERTION OF FEEDING DEVICE INTO STOMACH, VIA NATURAL OR ARTIFICIAL OPENING: ICD-10-PCS | Performed by: EMERGENCY MEDICINE

## 2022-06-04 RX ORDER — ACETAMINOPHEN 650 MG/1
650 SUPPOSITORY RECTAL
Status: DISCONTINUED | OUTPATIENT
Start: 2022-06-04 | End: 2022-06-12 | Stop reason: HOSPADM

## 2022-06-04 RX ORDER — SODIUM CHLORIDE 0.9 % (FLUSH) 0.9 %
5-40 SYRINGE (ML) INJECTION EVERY 8 HOURS
Status: DISCONTINUED | OUTPATIENT
Start: 2022-06-04 | End: 2022-06-12 | Stop reason: HOSPADM

## 2022-06-04 RX ORDER — ONDANSETRON 2 MG/ML
4 INJECTION INTRAMUSCULAR; INTRAVENOUS
Status: DISCONTINUED | OUTPATIENT
Start: 2022-06-04 | End: 2022-06-12 | Stop reason: HOSPADM

## 2022-06-04 RX ORDER — VENLAFAXINE HYDROCHLORIDE 37.5 MG/1
37.5 CAPSULE, EXTENDED RELEASE ORAL DAILY
Status: DISCONTINUED | OUTPATIENT
Start: 2022-06-05 | End: 2022-06-12 | Stop reason: HOSPADM

## 2022-06-04 RX ORDER — SODIUM CHLORIDE 0.9 % (FLUSH) 0.9 %
5-40 SYRINGE (ML) INJECTION AS NEEDED
Status: DISCONTINUED | OUTPATIENT
Start: 2022-06-04 | End: 2022-06-12 | Stop reason: HOSPADM

## 2022-06-04 RX ORDER — ENOXAPARIN SODIUM 100 MG/ML
40 INJECTION SUBCUTANEOUS DAILY
Status: DISCONTINUED | OUTPATIENT
Start: 2022-06-05 | End: 2022-06-12 | Stop reason: HOSPADM

## 2022-06-04 RX ORDER — ONDANSETRON 4 MG/1
4 TABLET, ORALLY DISINTEGRATING ORAL
Status: DISCONTINUED | OUTPATIENT
Start: 2022-06-04 | End: 2022-06-12 | Stop reason: HOSPADM

## 2022-06-04 RX ORDER — ACETAMINOPHEN 325 MG/1
650 TABLET ORAL
Status: DISCONTINUED | OUTPATIENT
Start: 2022-06-04 | End: 2022-06-12 | Stop reason: HOSPADM

## 2022-06-04 RX ORDER — ONDANSETRON 2 MG/ML
4 INJECTION INTRAMUSCULAR; INTRAVENOUS
Status: COMPLETED | OUTPATIENT
Start: 2022-06-04 | End: 2022-06-04

## 2022-06-04 RX ORDER — AMLODIPINE BESYLATE 5 MG/1
5 TABLET ORAL DAILY
Status: DISCONTINUED | OUTPATIENT
Start: 2022-06-05 | End: 2022-06-12 | Stop reason: HOSPADM

## 2022-06-04 RX ORDER — POLYETHYLENE GLYCOL 3350 17 G/17G
17 POWDER, FOR SOLUTION ORAL DAILY PRN
Status: DISCONTINUED | OUTPATIENT
Start: 2022-06-04 | End: 2022-06-12 | Stop reason: HOSPADM

## 2022-06-04 RX ORDER — HYDROMORPHONE HYDROCHLORIDE 1 MG/ML
1 INJECTION, SOLUTION INTRAMUSCULAR; INTRAVENOUS; SUBCUTANEOUS
Status: DISCONTINUED | OUTPATIENT
Start: 2022-06-04 | End: 2022-06-12 | Stop reason: HOSPADM

## 2022-06-04 RX ORDER — GABAPENTIN 100 MG/1
100 CAPSULE ORAL DAILY
Status: DISCONTINUED | OUTPATIENT
Start: 2022-06-05 | End: 2022-06-12 | Stop reason: HOSPADM

## 2022-06-04 RX ORDER — SODIUM CHLORIDE, SODIUM LACTATE, POTASSIUM CHLORIDE, CALCIUM CHLORIDE 600; 310; 30; 20 MG/100ML; MG/100ML; MG/100ML; MG/100ML
100 INJECTION, SOLUTION INTRAVENOUS CONTINUOUS
Status: DISCONTINUED | OUTPATIENT
Start: 2022-06-04 | End: 2022-06-12

## 2022-06-04 RX ADMIN — SODIUM CHLORIDE, PRESERVATIVE FREE 20 MG: 5 INJECTION INTRAVENOUS at 22:04

## 2022-06-04 RX ADMIN — SODIUM CHLORIDE 1000 ML: 9 INJECTION, SOLUTION INTRAVENOUS at 10:44

## 2022-06-04 RX ADMIN — SODIUM CHLORIDE, PRESERVATIVE FREE 10 ML: 5 INJECTION INTRAVENOUS at 18:18

## 2022-06-04 RX ADMIN — IOPAMIDOL 100 ML: 755 INJECTION, SOLUTION INTRAVENOUS at 11:25

## 2022-06-04 RX ADMIN — SODIUM CHLORIDE, POTASSIUM CHLORIDE, SODIUM LACTATE AND CALCIUM CHLORIDE 100 ML/HR: 600; 310; 30; 20 INJECTION, SOLUTION INTRAVENOUS at 18:10

## 2022-06-04 RX ADMIN — ONDANSETRON 4 MG: 2 INJECTION INTRAMUSCULAR; INTRAVENOUS at 10:44

## 2022-06-04 RX ADMIN — ONDANSETRON 4 MG: 2 INJECTION INTRAMUSCULAR; INTRAVENOUS at 18:07

## 2022-06-04 RX ADMIN — SODIUM CHLORIDE, PRESERVATIVE FREE 10 ML: 5 INJECTION INTRAVENOUS at 22:05

## 2022-06-04 RX ADMIN — HYDROMORPHONE HYDROCHLORIDE 1 MG: 1 INJECTION, SOLUTION INTRAMUSCULAR; INTRAVENOUS; SUBCUTANEOUS at 22:04

## 2022-06-04 RX ADMIN — HYDROMORPHONE HYDROCHLORIDE 1 MG: 1 INJECTION, SOLUTION INTRAMUSCULAR; INTRAVENOUS; SUBCUTANEOUS at 18:07

## 2022-06-04 NOTE — H&P
History and Physical    Patient: Vicente Goldman MRN: [id number]  SSN: [id number]    YOB: 1970  Age: 45 y.o. Sex: female      Subjective:      Vicente Goldman is a 45 y.o. female who followed back up at [facility] where she initially presented with n/v/abd pain 3 days ago; was given Bactrim for UTI and PPI. Returned today for obstipation, n/v, abd pain. Found to have SBO --> admission. Patient has hx of Ovarian CA resection 2016 after which she had a similar episode; it is in remission now. She is being evaluated by Ravi Moran/Yovani for Breast Lesion. Denied any other change. No active fever. She did have small BM yesterday. No BRBPR or hematemesis. Past Medical History:   Diagnosis Date    Hypertension     Ovarian cancer [facility])      Past Surgical History:   Procedure Laterality Date    HX HYSTERECTOMY      HX OOPHORECTOMY        History reviewed. No pertinent family history. Social History     Tobacco Use    Smoking status: Current Every Day Smoker     Packs/day: 0.50    Smokeless tobacco: Never Used   Substance Use Topics    Alcohol use: Yes     Comment: occasional      Prior to Admission medications    Medication Sig Start Date End Date Taking? Authorizing Provider   amLODIPine (NORVASC) 5 mg tablet Take 1 Tablet by mouth daily. 4/4/22  Yes Other, MD Dana   gabapentin (NEURONTIN) 100 mg capsule Take 100 mg by mouth daily. 1/19/22  Yes Other, MD Dana   venlafaxine-SR [facility].H..) 37.5 mg capsule Take 37.5 mg by mouth daily. 1/19/22  Yes Other, MD Dana   PEG 3350-Electrolytes (GO-LYTELY) 054-58.55-4.15 -5.86 gram suspension Follow [facility] written instructions 3/30/22  Yes Ashok, MD Dana   trimethoprim-sulfamethoxazole (Bactrim DS) 160-800 mg per tablet Take 1 Tablet by mouth two (2) times a day for 7 days.  6/1/22 6/8/22 Yes Bucky Ling MD   ondansetron hcl (ZOFRAN) 4 mg tablet Take 1 Tablet by mouth every eight (8) hours as needed for Nausea, Vomiting or Nausea or Vomiting. 6/1/22  Yes Cassi Betancourt MD   omeprazole (PRILOSEC) 20 mg capsule Take 1 Capsule by mouth daily. 6/1/22  Yes Cassi Betancourt MD   multivitamin (ONE A DAY) tablet Take 1 Tab by mouth daily.    Yes Other, MD Dana        No Known Allergies    Review of Systems:  Constitutional: No fevers, No chills, No night sweats, No fatigue, No weakness, No significant weight change  Eyes: No visual disturbance, No loss of vision  HENT: No nasal congestion, No sore throat, No head lesion, No hearing deficit  Respiratory: No cough, No sputum, No wheezing, No SOB, No hemoptysis, No pleuritic CP  Cardiovascular: No chest pain, No lower extremity edema, No palpitations, No PND, No orthopnea  Gastrointestinal: See HPI  Genitourinary: No frequency, No dysuria, No hematuria  Integument/Breast: No rash, No new skin lesion(s), No dryness, No new palpable nodule  Musculoskeletal: No arthralgias, No neck pain, No back pain, No joint pain, No fall or injury  Neurological: No headaches, No dizziness, No confusion,  No seizures, No focal weakness, No LOC, No paresthesia  Heme/Onc: No overt bleeding noted, No obvious swollen glands  Behavioral/Psychiatric: No change in mood; no SI; no hallucination      Objective:     Vitals:    06/04/22 1020   BP: (!) 143/88   Pulse: 83   Resp: 18   Temp: 98.3 °F (36.8 °C)   SpO2: 98%   Weight: 61.2 kg (135 lb)   Height: 5' 7\" (1.702 m)        Physical Exam:    General: Alert and responsive, NAD  Head: atraumatic  Eye: No overt orbital findings, PERRLA   ENT: NG with gastric content  Neck: Supple, No overt palpable mass, No significant palpable lymph nodes  Vascular: No carotid bruit, palpable pulses bilat  Lung: CTA bilat, vesicular breath sounds  Heart: S1S2, No significant murmur appreciated  Abdomen: Soft, NT, No rigidity, No rebound, Bowel sounds +  Extremities: No edema  M/S: No traumatic change, active mobility noted  Skin: No cyanosis, No rash of significance (other than chronic lesions)  Neurologic: No overt focal motor deficit. Oriented. Psychiatric: Coherent and age appropriate    Recent Results (from the past 24 hour(s))   URINALYSIS W/ REFLEX CULTURE    Collection Time: 06/04/22 10:30 AM    Specimen: Urine   Result Value Ref Range    Color Yellow/Straw      Appearance Cloudy (A) Clear      Specific gravity 1.025 1.003 - 1.030      pH (UA) 5.0 5.0 - 8.0      Protein 30 (A) Negative mg/dL    Glucose Negative Negative mg/dL    Ketone 15 (A) Negative mg/dL    Bilirubin Moderate (A) Negative      Blood Small (A) Negative      Urobilinogen 4.0 (H) 0.2 - 1.0 EU/dL    Nitrites Negative Negative      Leukocyte Esterase Large (A) Negative      WBC  0 - 4 /hpf    RBC  0 - 5 /hpf    Epithelial cells Many (A) Few /lpf    Bacteria 3+ (A) Negative /hpf    UA:UC IF INDICATED Urine Culture Ordered (A) Culture not indicated by UA result     CBC WITH AUTOMATED DIFF    Collection Time: 06/04/22 10:43 AM   Result Value Ref Range    WBC 5.7 3.6 - 11.0 K/uL    RBC 5.28 (H) 3.80 - 5.20 M/uL    HGB 16.9 (H) 11.5 - 16.0 g/dL    HCT 50.2 (H) 35.0 - 47.0 %    MCV 95.1 80.0 - 99.0 FL    MCH 32.0 26.0 - 34.0 PG    MCHC 33.7 30.0 - 36.5 g/dL    RDW 12.1 11.5 - 14.5 %    PLATELET 054 865 - 081 K/uL    MPV 11.2 8.9 - 12.9 FL    NEUTROPHILS 46 32 - 75 %    LYMPHOCYTES 34 12 - 49 %    MONOCYTES 19 (H) 5 - 13 %    EOSINOPHILS 1 0 - 7 %    BASOPHILS 0 0 - 1 %    IMMATURE GRANULOCYTES 0 0.0 - 0.5 %    ABS. NEUTROPHILS 2.6 1.8 - 8.0 K/UL    ABS. LYMPHOCYTES 1.9 0.8 - 3.5 K/UL    ABS. MONOCYTES 1.1 (H) 0.0 - 1.0 K/UL    ABS. EOSINOPHILS 0.0 0.0 - 0.4 K/UL    ABS. BASOPHILS 0.0 0.0 - 0.1 K/UL    ABS. IMM.  GRANS. 0.0 0.00 - 0.04 K/UL    DF AUTOMATED     METABOLIC PANEL, COMPREHENSIVE    Collection Time: 06/04/22 10:43 AM   Result Value Ref Range    Sodium 138 136 - 145 mmol/L    Potassium 4.2 3.5 - 5.1 mmol/L    Chloride 99 97 - 108 mmol/L    CO2 28 21 - 32 mmol/L    Anion gap 11 5 - 15 mmol/L    Glucose 111 (H) 65 - 100 mg/dL    BUN 14 6 - 20 mg/dL    Creatinine 1.07 (H) 0.55 - 1.02 mg/dL    BUN/Creatinine ratio 13 12 - 20      GFR est AA >60 >60 ml/min/1.73m2    GFR est non-AA 54 (L) >60 ml/min/1.73m2    Calcium 11.2 (H) 8.5 - 10.1 mg/dL    Bilirubin, total 0.8 0.2 - 1.0 mg/dL    AST (SGOT) 16 15 - 37 U/L    ALT (SGPT) 23 12 - 78 U/L    Alk. phosphatase 113 45 - 117 U/L    Protein, total 9.2 (H) 6.4 - 8.2 g/dL    Albumin 5.0 3.5 - 5.0 g/dL    Globulin 4.2 (H) 2.0 - 4.0 g/dL    A-G Ratio 1.2 1.1 - 2.2     LACTIC ACID    Collection Time: 06/04/22 10:43 AM   Result Value Ref Range    Lactic acid 1.2 0.4 - 2.0 mmol/L         CT Results (maximum last 3): Results from [place] encounter on 06/04/22    CT ABD PELV W CONT    Narrative  CT abdomen and pelvis with contrast:    Dose reduction: All CT scans at this facility are performed using dose reduction  optimization techniques as appropriate to a performed exam including the  following: Automated exposure control, adjustments of the mA and/or kV according  to patient size, or use of iterative reconstruction technique. 100 mL of Isovue-370 was used to perform axial images with sagittal and coronal  reconstructions. The stomach contains air and fluid. There are multiple dilated loops of small  bowel in the upper abdomen with transition to normal caliber small bowel loops  in the mid pelvis as seen with early/partial small bowel obstruction. The colon  is not distended and there is air in the rectum. There is no free air in the abdomen or pelvis. There is trace low-density  peritoneal fluid. No localized rim-enhancing fluid is seen to indicate an  abscess. The lung bases are clear. There is no acute abnormality in the liver, spleen or  pancreas. There are no calcified gallstones. There is no adrenal mass  bilaterally. Neither kidney is obstructed. The abdominal aorta is not aneurysmal.    There are retroperitoneal clips.  There is no abdominal/pelvic lymph node  enlargement. There is no acute skeletal abnormality. Impression  Early/partial small bowel obstruction.       Active Problems:    SBO (small bowel obstruction) ([address] (6/4/2022)        Assessment/Plan:     #1 Subacute SBO (Hx of Abd Surgery for Ovarian CA  - NG  - NPO  - C/s Surgery  - IVF/LR  - Sx control    #2 Hx HTN, Ovarian CA in Remission, Anxiety     DVT Prophylaxis: Lovenox  Code Status: Full    Signed By: Mariana Castillo MD     June 4, 2022

## 2022-06-04 NOTE — Clinical Note
Status[de-identified] INPATIENT [101]   Type of Bed: Medical [8]   Inpatient Hospitalization Certified Necessary for the Following Reasons: 3.  Patient receiving treatment that can only be provided in an inpatient setting (further clarification in H&P documentation)   Admitting Diagnosis: SBO (small bowel obstruction) [facility]) [[id number]]   Admitting Physician: Sravan Polo [[id number]]   Attending Physician: Sravan Polo [[id number]]   Estimated Length of Stay: 3-4 Midnights   Discharge Plan[de-identified] Home with Office Follow-up

## 2022-06-04 NOTE — CONSULTS
History and Physical    Chief complaints: Nausea and vomiting   History of Presenting Illness:  Candy Galo is a 45 y.o. very pleasant woman comes to the hospital because of she is not feeling well and progress gets worse with nausea and vomiting. Onset is 5 days ago. Patient has persistent nausea and vomiting. Patient also has abdominal swelling. Patient had last good bowel movement was about 3 weeks ago. Patient says she is having small bits of bowel movement. Patient has apparently has significant history of ovarian cancer requiring total abdominal hysterectomy bilateral oophorectomy about 6 years ago. Patient developed postop ileus requiring conservative management. Patient never had a bowel surgery for bowel obstruction. Patient also currently seen by surgeon for genetic work-up for breast cancer. All her cancer  work-up done at the [facility]. Past Medical History:   Diagnosis Date    Hypertension     Ovarian cancer [facility])       Past Surgical History:   Procedure Laterality Date    HX HYSTERECTOMY      HX OOPHORECTOMY       History reviewed. No pertinent family history. Social History     Tobacco Use    Smoking status: Current Every Day Smoker     Packs/day: 0.50    Smokeless tobacco: Never Used   Substance Use Topics    Alcohol use: Yes     Comment: occasional       Prior to Admission medications    Medication Sig Start Date End Date Taking? Authorizing Provider   amLODIPine (NORVASC) 5 mg tablet Take 1 Tablet by mouth daily. 4/4/22  Yes Other, MD Dana   gabapentin (NEURONTIN) 100 mg capsule Take 100 mg by mouth daily. 1/19/22  Yes Other, MD Dana   venlafaxine-SR [facility].H..) 37.5 mg capsule Take 37.5 mg by mouth daily.  1/19/22  Yes Other, MD Dana   PEG 3350-Electrolytes (GO-LYTELY) 758-41.10-1.85 -5.86 gram suspension Follow [facility] written instructions 3/30/22  Yes Other, MD Dana   trimethoprim-sulfamethoxazole (Bactrim DS) 160-800 mg per tablet Take 1 Tablet by mouth two (2) times a day for 7 days. 6/1/22 6/8/22 Yes Miranda Orellana MD   ondansetron hcl (ZOFRAN) 4 mg tablet Take 1 Tablet by mouth every eight (8) hours as needed for Nausea, Vomiting or Nausea or Vomiting. 6/1/22  Yes Miranda Orellana MD   omeprazole (PRILOSEC) 20 mg capsule Take 1 Capsule by mouth daily. 6/1/22  Yes Miranda Orellana MD   multivitamin (ONE A DAY) tablet Take 1 Tab by mouth daily. Yes Other, MD Dana     No Known Allergies     Review of Systems:  Pertinent review of systems discussed in HPI, and rest of organ systems personally reviewed and they are negative. Objective:   Vital signs reviewed:      Visit Vitals  BP (!) 152/87   Pulse 63   Temp 97.6 °F (36.4 °C)   Resp 18   Ht 5' 7\" (1.702 m)   Wt 128 lb 4.9 oz (58.2 kg)   SpO2 100%   BMI 20.10 kg/m²       Physical Exam:   General appearance:   Patient is awake and alert, not in particular distress. Head and neck atraumatic normocephalic. ENT shows normal oral mucosa, no jaundice no hoarse voice. Eyes: Pupil equal gaze appropriate. Cardiac system regular rate rhythm. Pulmonary: No audible wheeze. Chest wall: Chest wall excursion normal with respiration cycle, there is no deformity or chest trauma. Abdomen: Soft and mildly tender and mildly distended. Large midline incision noted. There is no signs of hernia. Neurologic: Nonfocal.  Cranial nerves intact, no new focal findings. Musculoskeletal system: Motor function normal limits, motor function 5 out of 5, range of motion normal in all 4 extremity  Skin: Warm and moist.  Hematologic system: No obvious bruising. Psychosocial: Appropriate and cooperative. Vascular examination: Lower and upper extremities warm to touch, no signs of ischemia or cyanosis. Data Review: Labs are reviewed.  Discussed  Recent Results (from the past 24 hour(s))   URINALYSIS W/ REFLEX CULTURE    Collection Time: 06/04/22 10:30 AM    Specimen: Urine   Result Value Ref Range    Color Yellow/Straw      Appearance Cloudy (A) Clear      Specific gravity 1.025 1.003 - 1.030      pH (UA) 5.0 5.0 - 8.0      Protein 30 (A) Negative mg/dL    Glucose Negative Negative mg/dL    Ketone 15 (A) Negative mg/dL    Bilirubin Moderate (A) Negative      Blood Small (A) Negative      Urobilinogen 4.0 (H) 0.2 - 1.0 EU/dL    Nitrites Negative Negative      Leukocyte Esterase Large (A) Negative      WBC  0 - 4 /hpf    RBC  0 - 5 /hpf    Epithelial cells Many (A) Few /lpf    Bacteria 3+ (A) Negative /hpf    UA:UC IF INDICATED Urine Culture Ordered (A) Culture not indicated by UA result     CBC WITH AUTOMATED DIFF    Collection Time: 06/04/22 10:43 AM   Result Value Ref Range    WBC 5.7 3.6 - 11.0 K/uL    RBC 5.28 (H) 3.80 - 5.20 M/uL    HGB 16.9 (H) 11.5 - 16.0 g/dL    HCT 50.2 (H) 35.0 - 47.0 %    MCV 95.1 80.0 - 99.0 FL    MCH 32.0 26.0 - 34.0 PG    MCHC 33.7 30.0 - 36.5 g/dL    RDW 12.1 11.5 - 14.5 %    PLATELET 521 833 - 012 K/uL    MPV 11.2 8.9 - 12.9 FL    NEUTROPHILS 46 32 - 75 %    LYMPHOCYTES 34 12 - 49 %    MONOCYTES 19 (H) 5 - 13 %    EOSINOPHILS 1 0 - 7 %    BASOPHILS 0 0 - 1 %    IMMATURE GRANULOCYTES 0 0.0 - 0.5 %    ABS. NEUTROPHILS 2.6 1.8 - 8.0 K/UL    ABS. LYMPHOCYTES 1.9 0.8 - 3.5 K/UL    ABS. MONOCYTES 1.1 (H) 0.0 - 1.0 K/UL    ABS. EOSINOPHILS 0.0 0.0 - 0.4 K/UL    ABS. BASOPHILS 0.0 0.0 - 0.1 K/UL    ABS. IMM.  GRANS. 0.0 0.00 - 0.04 K/UL    DF AUTOMATED     METABOLIC PANEL, COMPREHENSIVE    Collection Time: 06/04/22 10:43 AM   Result Value Ref Range    Sodium 138 136 - 145 mmol/L    Potassium 4.2 3.5 - 5.1 mmol/L    Chloride 99 97 - 108 mmol/L    CO2 28 21 - 32 mmol/L    Anion gap 11 5 - 15 mmol/L    Glucose 111 (H) 65 - 100 mg/dL    BUN 14 6 - 20 mg/dL    Creatinine 1.07 (H) 0.55 - 1.02 mg/dL    BUN/Creatinine ratio 13 12 - 20      GFR est AA >60 >60 ml/min/1.73m2    GFR est non-AA 54 (L) >60 ml/min/1.73m2    Calcium 11.2 (H) 8.5 - 10.1 mg/dL    Bilirubin, total 0.8 0.2 - 1.0 mg/dL AST (SGOT) 16 15 - 37 U/L    ALT (SGPT) 23 12 - 78 U/L    Alk. phosphatase 113 45 - 117 U/L    Protein, total 9.2 (H) 6.4 - 8.2 g/dL    Albumin 5.0 3.5 - 5.0 g/dL    Globulin 4.2 (H) 2.0 - 4.0 g/dL    A-G Ratio 1.2 1.1 - 2.2     LACTIC ACID    Collection Time: 06/04/22 10:43 AM   Result Value Ref Range    Lactic acid 1.2 0.4 - 2.0 mmol/L             Imagings reviewed: discussed as below. No name on file. Assessment:     Active Problems:    SBO (small bowel obstruction) ([facility] [address] (6/4/2022)        Plan:     I reviewed the CT scan abdomen pelvis. It looks like there is middle small bowel dilatation and proximal and distal small bowel is decompressed. We will approach this partial small bowel obstruction conservatively for now. Keep her n.p.o. She can have ice chips. She has NG tube in place. Check electrolytes daily. Hopefully patient will respond conservative management. If she does not show any signs of improvement next 24 to 48 hours we will repeat CT scan abdomen pelvis.

## 2022-06-04 NOTE — ED TRIAGE NOTES
Pt states she has been vomiting, SOB, fatigue, and chills since she left here on 6/1/22. States she came here for the same thing but it has now gotten worse. She was diagnosed with acid reflux and UTI.

## 2022-06-04 NOTE — ED PROVIDER NOTES
EMERGENCY DEPARTMENT HISTORY AND PHYSICAL EXAM      Date: 6/4/2022  Patient Name: Park Spatz    History of Presenting Illness     Chief Complaint   Patient presents with    Vomiting    Shortness of Breath    Fatigue       History Provided By: Patient    HPI: Park Spatz, 45 y.o. female with a past medical history significant ovarian cancer with hysterectomy and oophrectomy presents to the ED with cc of persistent NV for past several days. She was seen ED two days ago and was unable to get a CT scan as scanner was down and she notes not wanting to go to [facility] to have it done. She continued to vomit and noted it was smelly and dark in nature. She has diffuse abd pain. She reports significant decrease is passing gas. There are no other complaints, changes, or physical findings at this time.     PCP: Ashok, MD Dana    Current Facility-Administered Medications   Medication Dose Route Frequency Provider Last Rate Last Admin    benzocaine-menthoL (CEPACOL) lozenge 1 Lozenge  1 Lozenge Mucous Membrane PRN Jaime Stearns PA-C        sodium chloride (NS) flush 5-40 mL  5-40 mL IntraVENous Q8H Joslyn Armenta MD   10 mL at 06/05/22 0522    sodium chloride (NS) flush 5-40 mL  5-40 mL IntraVENous PRN Joslyn Armenta MD        acetaminophen (TYLENOL) tablet 650 mg  650 mg Oral Q6H PRN Joslyn Armenta MD        Or   Pepper acetaminophen (TYLENOL) suppository 650 mg  650 mg Rectal Q6H PRN Joslyn Armenta MD        polyethylene glycol (MIRALAX) packet 17 g  17 g Oral DAILY PRN Joslyn Armenta MD        ondansetron (ZOFRAN ODT) tablet 4 mg  4 mg Oral Q8H PRN Joslyn Armenta MD        Or    ondansetron [facility]) injection 4 mg  4 mg IntraVENous Q6H PRN Joslyn Armenta MD   4 mg at 06/05/22 0333    enoxaparin (LOVENOX) injection 40 mg  40 mg SubCUTAneous DAILY Joslyn Armenta MD        lactated Ringers infusion  100 mL/hr IntraVENous CONTINUOUS Joslyn Armenta  mL/hr at 06/05/22 0522 100 mL/hr at 06/05/22 0522    amLODIPine (NORVASC) tablet 5 mg  5 mg Oral DAILY Vanna Olguin MD        gabapentin (NEURONTIN) capsule 100 mg  100 mg Oral DAILY Vanna Olguin MD        venlafaxine-SR [facility] capsule 37.5 mg  37.5 mg Oral DAILY Luis Carlos Stevens MD        famotidine (PF) (PEPCID) 20 mg in 0.9% sodium chloride 10 mL injection  20 mg IntraVENous Q12H uLis Carlos Stevens MD   20 mg at 06/04/22 2204    HYDROmorphone (DILAUDID) injection 1 mg  1 mg IntraVENous Q4H PRN Anastasia Dennis MD   1 mg at 06/05/22 7603       Past History     Past Medical History:  Past Medical History:   Diagnosis Date    Hypertension     Ovarian cancer [facility])        Past Surgical History:  Past Surgical History:   Procedure Laterality Date    HX HYSTERECTOMY      HX OOPHORECTOMY         Family History:  History reviewed. No pertinent family history. Social History:  Social History     Tobacco Use    Smoking status: Current Every Day Smoker     Packs/day: 0.50    Smokeless tobacco: Never Used   Substance Use Topics    Alcohol use: Yes     Comment: occasional    Drug use: Yes     Types: Marijuana     Comment: occasionally       Allergies:  No Known Allergies      Review of Systems   Review of Systems   Constitutional: Positive for appetite change and fatigue. HENT: Negative. Respiratory: Negative. Cardiovascular: Negative. Gastrointestinal: Positive for abdominal pain, nausea and vomiting. Genitourinary: Negative. Negative for dysuria and urgency. Musculoskeletal: Negative. Neurological: Negative. Psychiatric/Behavioral: Negative. All other systems reviewed and are negative. Physical Exam   Physical Exam  Vitals and nursing note reviewed. Constitutional:       Appearance: She is ill-appearing. HENT:      Head: Normocephalic. Eyes:      Extraocular Movements: Extraocular movements intact. Cardiovascular:      Rate and Rhythm: Normal rate and regular rhythm.    Pulmonary:      Effort: Pulmonary effort is normal.   Abdominal:      Tenderness: There is abdominal tenderness. There is no guarding or rebound. Skin:     General: Skin is warm. Neurological:      General: No focal deficit present. Diagnostic Study Results     Labs -New Results - Labs    Updated   Order    06/04/22 1103  LACTIC ACID   Collected: 06/04/22 1043  Final result  Specimen: Whole Blood from Plasma     Lactic acid 1.2 mmol/L          88/99/44 4072  METABOLIC PANEL, COMPREHENSIVE   Collected: 06/04/22 1043  Final result  Specimen: Serum or Plasma     Sodium 138 mmol/L   Potassium 4.2 mmol/L    Chloride 99 mmol/L   CO2 28 mmol/L   Anion gap 11 mmol/L   Glucose 111 High  mg/dL   BUN 14 mg/dL   Creatinine 1.07 High  mg/dL   BUN/Creatinine ratio 13     GFR est AA >60 ml/min/1.73m2   GFR est non-AA 54 Low  ml/min/1.73m2   Calcium 11.2 High  mg/dL   Bilirubin, total 0.8 mg/dL   AST (SGOT) 16 U/L   ALT (SGPT) 23 U/L   Alk. phosphatase 113 U/L   Protein, total 9.2 High  g/dL   Albumin 5.0 g/dL   Globulin 4.2 High  g/dL   A-G Ratio 1.2            06/04/22 1050  CBC WITH AUTOMATED DIFF   Collected: 06/04/22 1043  Final result  Specimen: Whole Blood     WBC 5.7 K/uL   RBC 5.28 High  M/uL   HGB 16.9 High  g/dL   HCT 50.2 High  %   MCV 95.1 FL   MCH 32.0 PG   MCHC 33.7 g/dL   RDW 12.1 %   PLATELET 206 K/uL   MPV 11.2 FL   NEUTROPHILS 46 %   LYMPHOCYTES 34 %   MONOCYTES 19 High  %   EOSINOPHILS 1 %   BASOPHILS 0 %   IMMATURE GRANULOCYTES 0 %   ABS. NEUTROPHILS 2.6 K/UL   ABS. LYMPHOCYTES 1.9 K/UL   ABS. MONOCYTES 1.1 High  K/UL   ABS. EOSINOPHILS 0.0 K/UL   ABS. BASOPHILS 0.0 K/UL   ABS. IMM.  GRANS. 0.0 K/UL   DF AUTOMATED            06/04/22 1054  CULTURE, URINE   Collected: 06/04/22 1030  In process  Specimen: Urine            06/04/22 1053  URINALYSIS W/ REFLEX CULTURE   Collected: 06/04/22 1030  Final result  Specimen: Urine     Color Yellow/Straw      Appearance Cloudy Abnormal      Specific gravity 1.025     pH (UA) 5.0     Protein 30 Abnormal  mg/dL   Glucose Negative mg/dL   Ketone 15 Abnormal  mg/dL   Bilirubin Moderate Abnormal      Blood Small Abnormal      Urobilinogen 4.0 High  EU/dL   Nitrites Negative     Leukocyte Esterase Large Abnormal      WBC  /hpf   RBC  /hpf   Epithelial cells Many Abnormal  /lpf    Bacteria 3+ Abnormal  /hpf   UA:UC IF INDICATED Urine Culture Ordered Abnormal             Radiologic Studies -   CT ABD PELV W CONT   Final Result   Early/partial small bowel obstruction. XR ABD FLAT/ ERECT    (Results Pending)     CT Results  (Last 48 hours)               06/04/22 1122  CT ABD PELV W CONT Final result    Impression:  Early/partial small bowel obstruction. Narrative:  CT abdomen and pelvis with contrast:       Dose reduction: All CT scans at this facility are performed using dose reduction   optimization techniques as appropriate to a performed exam including the   following: Automated exposure control, adjustments of the mA and/or kV according   to patient size, or use of iterative reconstruction technique. 100 mL of Isovue-370 was used to perform axial images with sagittal and coronal   reconstructions. The stomach contains air and fluid. There are multiple dilated loops of small   bowel in the upper abdomen with transition to normal caliber small bowel loops   in the mid pelvis as seen with early/partial small bowel obstruction. The colon   is not distended and there is air in the rectum. There is no free air in the abdomen or pelvis. There is trace low-density   peritoneal fluid. No localized rim-enhancing fluid is seen to indicate an   abscess. The lung bases are clear. There is no acute abnormality in the liver, spleen or   pancreas. There are no calcified gallstones. There is no adrenal mass   bilaterally. Neither kidney is obstructed. The abdominal aorta is not aneurysmal.       There are retroperitoneal clips.  There is no abdominal/pelvic lymph node   enlargement. There is no acute skeletal abnormality. CXR Results  (Last 48 hours)    None        [unfilled]    Medical Decision Making and ED Course   I am the first provider for this patient. I reviewed the vital signs, available nursing notes, past medical history, past surgical history, family history and social history. Vital Signs-Reviewed the patient's vital signs. Patient Vitals for the past 12 hrs:   Temp Pulse Resp BP SpO2   06/04/22 2216 98.1 °F (36.7 °C) 70 18 115/77 100 %       Records Reviewed: Nursing Notes and Old Medical Records     Personally reviewed record from 6/1 ED visit. Provider Notes (Medical Decision Making): The patient presents with abdominal pain with a differential diagnosis of abdominal pain, biliary colic, cholecystitis, diverticulitis, gastritis, gastroenteritis, obstruction and UTI/pyelo with dehydration, electrolyte abnl. ED Course:   Initial assessment performed. The patients presenting problems have been discussed, and they are in agreement with the care plan formulated and outlined with them. I have encouraged them to ask questions as they arise throughout their visit. ED Course as of 06/05/22 0707   Sat Jun 04, 2022   1157 Pt reports feeling better after IVF and Zofran  Update pt on SBO and planned admission [LG]      ED Course User Index  [LG] Jerald Phillips MD     CONSULT NOTE:   1500  Dr Madeline Owens spoke with Dr Luis Enrique Gautam  Specialty: Surgery  Discussed pt's hx, disposition, and available diagnostic and imaging results. Reviewed care plans. Consultant will consult on patient during admission. Agreed with NGT. Procedures       NGT placed per Dr Luis Enrique Gautam request. Pt tolerated well. Disposition     ADMITTED    Diagnosis     Clinical Impression: SBO  Attestations:    Carolyn Vazquez MD    Please note that this dictation was completed with M[employer], the [employer] voice recognition software.   Quite often unanticipated grammatical, syntax, homophones, and other interpretive errors are inadvertently transcribed by the computer software. Please disregard these errors. Please excuse any errors that have escaped final proofreading. Thank you.

## 2022-06-05 ENCOUNTER — APPOINTMENT (OUTPATIENT)
Dept: GENERAL RADIOLOGY | Age: 52
DRG: 247 | End: 2022-06-05
Attending: INTERNAL MEDICINE
Payer: MEDICAID

## 2022-06-05 LAB
ALBUMIN SERPL-MCNC: 4 G/DL (ref 3.5–5)
ALBUMIN/GLOB SERPL: 1.1 {RATIO} (ref 1.1–2.2)
ALP SERPL-CCNC: 89 U/L (ref 45–117)
ALT SERPL-CCNC: 12 U/L (ref 12–78)
ANION GAP SERPL CALC-SCNC: 7 MMOL/L (ref 5–15)
AST SERPL W P-5'-P-CCNC: 10 U/L (ref 15–37)
BACTERIA SPEC CULT: NORMAL
BASOPHILS # BLD: 0 K/UL (ref 0–0.1)
BASOPHILS NFR BLD: 0 % (ref 0–1)
BILIRUB SERPL-MCNC: 0.7 MG/DL (ref 0.2–1)
BUN SERPL-MCNC: 11 MG/DL (ref 6–20)
BUN/CREAT SERPL: 17 (ref 12–20)
CA-I BLD-MCNC: 10.6 MG/DL (ref 8.5–10.1)
CHLORIDE SERPL-SCNC: 105 MMOL/L (ref 97–108)
CO2 SERPL-SCNC: 27 MMOL/L (ref 21–32)
COLONY COUNT,CNT: NORMAL
COLONY COUNT,CNT: NORMAL
CREAT SERPL-MCNC: 0.66 MG/DL (ref 0.55–1.02)
DIFFERENTIAL METHOD BLD: ABNORMAL
EOSINOPHIL # BLD: 0 K/UL (ref 0–0.4)
EOSINOPHIL NFR BLD: 0 % (ref 0–7)
ERYTHROCYTE [DISTWIDTH] IN BLOOD BY AUTOMATED COUNT: 12.1 % (ref 11.5–14.5)
GLOBULIN SER CALC-MCNC: 3.7 G/DL (ref 2–4)
GLUCOSE SERPL-MCNC: 92 MG/DL (ref 65–100)
HCT VFR BLD AUTO: 43.8 % (ref 35–47)
HGB BLD-MCNC: 14.8 G/DL (ref 11.5–16)
IMM GRANULOCYTES # BLD AUTO: 0 K/UL (ref 0–0.04)
IMM GRANULOCYTES NFR BLD AUTO: 0 % (ref 0–0.5)
LYMPHOCYTES # BLD: 1.1 K/UL (ref 0.8–3.5)
LYMPHOCYTES NFR BLD: 12 % (ref 12–49)
MAGNESIUM SERPL-MCNC: 1.8 MG/DL (ref 1.6–2.4)
MCH RBC QN AUTO: 32.2 PG (ref 26–34)
MCHC RBC AUTO-ENTMCNC: 33.8 G/DL (ref 30–36.5)
MCV RBC AUTO: 95.4 FL (ref 80–99)
MONOCYTES # BLD: 0.8 K/UL (ref 0–1)
MONOCYTES NFR BLD: 9 % (ref 5–13)
NEUTS SEG # BLD: 7.7 K/UL (ref 1.8–8)
NEUTS SEG NFR BLD: 79 % (ref 32–75)
NRBC # BLD: 0 K/UL (ref 0–0.01)
NRBC BLD-RTO: 0 PER 100 WBC
PHOSPHATE SERPL-MCNC: 3 MG/DL (ref 2.6–4.7)
PLATELET # BLD AUTO: 148 K/UL (ref 150–400)
PMV BLD AUTO: 12 FL (ref 8.9–12.9)
POTASSIUM SERPL-SCNC: 3.7 MMOL/L (ref 3.5–5.1)
PROT SERPL-MCNC: 7.7 G/DL (ref 6.4–8.2)
RBC # BLD AUTO: 4.59 M/UL (ref 3.8–5.2)
SODIUM SERPL-SCNC: 139 MMOL/L (ref 136–145)
SPECIAL REQUESTS,SREQ: NORMAL
WBC # BLD AUTO: 9.8 K/UL (ref 3.6–11)

## 2022-06-05 PROCEDURE — 83735 ASSAY OF MAGNESIUM: CPT

## 2022-06-05 PROCEDURE — 85025 COMPLETE CBC W/AUTO DIFF WBC: CPT

## 2022-06-05 PROCEDURE — 99222 1ST HOSP IP/OBS MODERATE 55: CPT | Performed by: INTERNAL MEDICINE

## 2022-06-05 PROCEDURE — 36415 COLL VENOUS BLD VENIPUNCTURE: CPT

## 2022-06-05 PROCEDURE — 74019 RADEX ABDOMEN 2 VIEWS: CPT

## 2022-06-05 PROCEDURE — 84100 ASSAY OF PHOSPHORUS: CPT

## 2022-06-05 PROCEDURE — 74011000250 HC RX REV CODE- 250: Performed by: INTERNAL MEDICINE

## 2022-06-05 PROCEDURE — 99232 SBSQ HOSP IP/OBS MODERATE 35: CPT | Performed by: SURGERY

## 2022-06-05 PROCEDURE — 74011250636 HC RX REV CODE- 250/636: Performed by: STUDENT IN AN ORGANIZED HEALTH CARE EDUCATION/TRAINING PROGRAM

## 2022-06-05 PROCEDURE — 74011250636 HC RX REV CODE- 250/636: Performed by: SURGERY

## 2022-06-05 PROCEDURE — 80053 COMPREHEN METABOLIC PANEL: CPT

## 2022-06-05 PROCEDURE — 65270000029 HC RM PRIVATE

## 2022-06-05 PROCEDURE — 74011250636 HC RX REV CODE- 250/636: Performed by: INTERNAL MEDICINE

## 2022-06-05 RX ORDER — LEVOFLOXACIN 5 MG/ML
500 INJECTION, SOLUTION INTRAVENOUS EVERY 24 HOURS
Status: DISCONTINUED | OUTPATIENT
Start: 2022-06-05 | End: 2022-06-08

## 2022-06-05 RX ORDER — KETOROLAC TROMETHAMINE 30 MG/ML
30 INJECTION, SOLUTION INTRAMUSCULAR; INTRAVENOUS
Status: DISPENSED | OUTPATIENT
Start: 2022-06-05 | End: 2022-06-10

## 2022-06-05 RX ORDER — METRONIDAZOLE 500 MG/100ML
500 INJECTION, SOLUTION INTRAVENOUS EVERY 12 HOURS
Status: COMPLETED | OUTPATIENT
Start: 2022-06-05 | End: 2022-06-11

## 2022-06-05 RX ADMIN — HYDROMORPHONE HYDROCHLORIDE 1 MG: 1 INJECTION, SOLUTION INTRAMUSCULAR; INTRAVENOUS; SUBCUTANEOUS at 17:01

## 2022-06-05 RX ADMIN — ONDANSETRON 4 MG: 2 INJECTION INTRAMUSCULAR; INTRAVENOUS at 09:10

## 2022-06-05 RX ADMIN — HYDROMORPHONE HYDROCHLORIDE 1 MG: 1 INJECTION, SOLUTION INTRAMUSCULAR; INTRAVENOUS; SUBCUTANEOUS at 03:33

## 2022-06-05 RX ADMIN — ONDANSETRON 4 MG: 2 INJECTION INTRAMUSCULAR; INTRAVENOUS at 03:33

## 2022-06-05 RX ADMIN — ENOXAPARIN SODIUM 40 MG: 100 INJECTION SUBCUTANEOUS at 09:00

## 2022-06-05 RX ADMIN — SODIUM CHLORIDE, PRESERVATIVE FREE 20 MG: 5 INJECTION INTRAVENOUS at 20:23

## 2022-06-05 RX ADMIN — SODIUM CHLORIDE, PRESERVATIVE FREE 10 ML: 5 INJECTION INTRAVENOUS at 23:55

## 2022-06-05 RX ADMIN — SODIUM CHLORIDE, POTASSIUM CHLORIDE, SODIUM LACTATE AND CALCIUM CHLORIDE 100 ML/HR: 600; 310; 30; 20 INJECTION, SOLUTION INTRAVENOUS at 05:22

## 2022-06-05 RX ADMIN — SODIUM CHLORIDE, PRESERVATIVE FREE 20 MG: 5 INJECTION INTRAVENOUS at 09:10

## 2022-06-05 RX ADMIN — HYDROMORPHONE HYDROCHLORIDE 1 MG: 1 INJECTION, SOLUTION INTRAMUSCULAR; INTRAVENOUS; SUBCUTANEOUS at 09:10

## 2022-06-05 RX ADMIN — SODIUM CHLORIDE, PRESERVATIVE FREE 10 ML: 5 INJECTION INTRAVENOUS at 12:52

## 2022-06-05 RX ADMIN — HYDROMORPHONE HYDROCHLORIDE 1 MG: 1 INJECTION, SOLUTION INTRAMUSCULAR; INTRAVENOUS; SUBCUTANEOUS at 12:51

## 2022-06-05 RX ADMIN — ONDANSETRON 4 MG: 2 INJECTION INTRAMUSCULAR; INTRAVENOUS at 17:03

## 2022-06-05 RX ADMIN — KETOROLAC TROMETHAMINE 30 MG: 30 INJECTION, SOLUTION INTRAMUSCULAR; INTRAVENOUS at 20:22

## 2022-06-05 RX ADMIN — SODIUM CHLORIDE, POTASSIUM CHLORIDE, SODIUM LACTATE AND CALCIUM CHLORIDE 100 ML/HR: 600; 310; 30; 20 INJECTION, SOLUTION INTRAVENOUS at 20:22

## 2022-06-05 RX ADMIN — METRONIDAZOLE 500 MG: 500 INJECTION, SOLUTION INTRAVENOUS at 23:55

## 2022-06-05 RX ADMIN — HYDROMORPHONE HYDROCHLORIDE 1 MG: 1 INJECTION, SOLUTION INTRAMUSCULAR; INTRAVENOUS; SUBCUTANEOUS at 23:55

## 2022-06-05 RX ADMIN — METRONIDAZOLE 500 MG: 500 INJECTION, SOLUTION INTRAVENOUS at 12:25

## 2022-06-05 RX ADMIN — LEVOFLOXACIN 500 MG: 5 INJECTION, SOLUTION INTRAVENOUS at 12:25

## 2022-06-05 RX ADMIN — SODIUM CHLORIDE, PRESERVATIVE FREE 10 ML: 5 INJECTION INTRAVENOUS at 05:22

## 2022-06-05 NOTE — PROGRESS NOTES
PROGRESS NOTE      Chief Complaints:  Patient has no new complaints  HPI and  Objective:    Patient states that she is not passing any gas. NG tube output noted   No fever chills chest pain or worsening abdominal pain. Review of Systems:  Rest of review of system negative, personally reviewed  EXAM:  Visit Vitals  BP (!) 154/88 (BP Patient Position: At rest)   Pulse 68   Temp 98.1 °F (36.7 °C)   Resp 16   Ht 5' 7\" (1.702 m)   Wt 128 lb 4.9 oz (58.2 kg)   SpO2 100%   BMI 20.10 kg/m²       Patient is awake and alert. Head and neck atraumatic, normocephalic. ENT: No hoarse voice  Cardiac system regular rate rhythm. Pulmonary no audible wheeze  Chest wall excursion normal with respiration cycle  Abdomen is soft not particularly distended. Neurologically nonfocal.  Skin is warm and moist.  Psychosocial: Cooperative. Vascular examination as previously noted no changes.     Recent Results (from the past 24 hour(s))   URINALYSIS W/ REFLEX CULTURE    Collection Time: 06/04/22 10:30 AM    Specimen: Urine   Result Value Ref Range    Color Yellow/Straw      Appearance Cloudy (A) Clear      Specific gravity 1.025 1.003 - 1.030      pH (UA) 5.0 5.0 - 8.0      Protein 30 (A) Negative mg/dL    Glucose Negative Negative mg/dL    Ketone 15 (A) Negative mg/dL    Bilirubin Moderate (A) Negative      Blood Small (A) Negative      Urobilinogen 4.0 (H) 0.2 - 1.0 EU/dL    Nitrites Negative Negative      Leukocyte Esterase Large (A) Negative      WBC  0 - 4 /hpf    RBC  0 - 5 /hpf    Epithelial cells Many (A) Few /lpf    Bacteria 3+ (A) Negative /hpf    UA:UC IF INDICATED Urine Culture Ordered (A) Culture not indicated by UA result     CBC WITH AUTOMATED DIFF    Collection Time: 06/04/22 10:43 AM   Result Value Ref Range    WBC 5.7 3.6 - 11.0 K/uL    RBC 5.28 (H) 3.80 - 5.20 M/uL    HGB 16.9 (H) 11.5 - 16.0 g/dL    HCT 50.2 (H) 35.0 - 47.0 %    MCV 95.1 80.0 - 99.0 FL    MCH 32.0 26.0 - 34.0 PG    MCHC 33.7 30.0 - 36.5 g/dL    RDW 12.1 11.5 - 14.5 %    PLATELET 818 168 - 717 K/uL    MPV 11.2 8.9 - 12.9 FL    NEUTROPHILS 46 32 - 75 %    LYMPHOCYTES 34 12 - 49 %    MONOCYTES 19 (H) 5 - 13 %    EOSINOPHILS 1 0 - 7 %    BASOPHILS 0 0 - 1 %    IMMATURE GRANULOCYTES 0 0.0 - 0.5 %    ABS. NEUTROPHILS 2.6 1.8 - 8.0 K/UL    ABS. LYMPHOCYTES 1.9 0.8 - 3.5 K/UL    ABS. MONOCYTES 1.1 (H) 0.0 - 1.0 K/UL    ABS. EOSINOPHILS 0.0 0.0 - 0.4 K/UL    ABS. BASOPHILS 0.0 0.0 - 0.1 K/UL    ABS. IMM. GRANS. 0.0 0.00 - 0.04 K/UL    DF AUTOMATED     METABOLIC PANEL, COMPREHENSIVE    Collection Time: 06/04/22 10:43 AM   Result Value Ref Range    Sodium 138 136 - 145 mmol/L    Potassium 4.2 3.5 - 5.1 mmol/L    Chloride 99 97 - 108 mmol/L    CO2 28 21 - 32 mmol/L    Anion gap 11 5 - 15 mmol/L    Glucose 111 (H) 65 - 100 mg/dL    BUN 14 6 - 20 mg/dL    Creatinine 1.07 (H) 0.55 - 1.02 mg/dL    BUN/Creatinine ratio 13 12 - 20      GFR est AA >60 >60 ml/min/1.73m2    GFR est non-AA 54 (L) >60 ml/min/1.73m2    Calcium 11.2 (H) 8.5 - 10.1 mg/dL    Bilirubin, total 0.8 0.2 - 1.0 mg/dL    AST (SGOT) 16 15 - 37 U/L    ALT (SGPT) 23 12 - 78 U/L    Alk. phosphatase 113 45 - 117 U/L    Protein, total 9.2 (H) 6.4 - 8.2 g/dL    Albumin 5.0 3.5 - 5.0 g/dL    Globulin 4.2 (H) 2.0 - 4.0 g/dL    A-G Ratio 1.2 1.1 - 2.2     LACTIC ACID    Collection Time: 06/04/22 10:43 AM   Result Value Ref Range    Lactic acid 1.2 0.4 - 2.0 mmol/L       ASSESSMENT:   Patient is 45 y.o. with diagnosis of : Active Problems:    SBO (small bowel obstruction) ([facility] [address] (6/4/2022)        PLAN:                 On examination abdomen is soft and there is no tenderness. Labs reviewed. White cell count 5.7. Potassium 4.2. Right is 1.07. Patient seems to be responding conservative management. Abdomen softer today. Continue n.p.o. and NG tube until GI function normalizes. Repeat labs in the morning. Continue monitor her progress.

## 2022-06-05 NOTE — CONSULTS
Infectious Disease Consult Note    Reason for Consult: Trichomonas vaginitis  Date of Consultation: June 5, 2022  Date of Admission: 6/4/2022  Referring Physician: Hospitalist     HPI: 45 y.o BF w c/o abdominal bloating associated with discomfort, nausea and vomiting for 5 days. She was seen in the ED on 06/01 w similar symptoms but discharged on Bactrim for UTI, returned on 06/04 w c/o worsening symptoms. ID consulted afterTrichomonas was noted on her UA (06/01). She has been afebrile since presentation w a normal WBC on routine labs. Her medical history significant for ovarian Ca diagnosed in 2015, s/p abdominal hysterectomy, and b/l oophorectomy. Her post-op course was complicated by ileus for which she never required surgery. She is being evaluated for breast CA. Pt states she has not had sexual intercourse in many years, denies malodorous vaginal discharge or discomfort   CT abdo/pel on 06/04 revealed early/partial small bowel obstruction. She is currently has an NGT in place. She is on Metronidazole and levaquin. She denies acute complaints during my assessment.       Review of Systems:     Gen: Negative for chills, fevers, weight loss, weight gain   CV:  Negative for chest pain, dyspnea on exertion, leg edema   Lungs: Negative for shortness of breath, cough, wheezing   Abdomen: Abdominal discomfort associated w Nausea/vomiting    Genitourinary: Negative for genital pain or genital discharge     Neuro: Negative for headache, numbness, tingling, extremity weakness,    Skin: Negative for rash, sores/open wounds   Musculoskeletal: Negative for joint pain, joint swelling, joint erythema    Psych: Negative for manic behavior     Past Medical History:  Past Medical History:   Diagnosis Date    Hypertension     Ovarian cancer ([facility] [address]        Past surgical history   Past Surgical History:   Procedure Laterality Date    HX HYSTERECTOMY      HX OOPHORECTOMY          Social History   Social History     Tobacco Use    Smoking status: Current Every Day Smoker     Packs/day: 0.50    Smokeless tobacco: Never Used   Substance Use Topics    Alcohol use: Yes     Comment: occasional    Drug use: Yes     Types: Marijuana     Comment: occasionally        Family history   History reviewed. No pertinent family history. Allergies:  No Known Allergies      Medications:  No current facility-administered medications on file prior to encounter. Current Outpatient Medications on File Prior to Encounter   Medication Sig Dispense Refill    amLODIPine (NORVASC) 5 mg tablet Take 1 Tablet by mouth daily.  gabapentin (NEURONTIN) 100 mg capsule Take 100 mg by mouth daily.  venlafaxine-SR (EFFEXOR-XR) 37.5 mg capsule Take 37.5 mg by mouth daily.  PEG 3350-Electrolytes (GO-LYTELY) 236-22.74-6.74 -5.86 gram suspension Follow [facility] written instructions      trimethoprim-sulfamethoxazole (Bactrim DS) 160-800 mg per tablet Take 1 Tablet by mouth two (2) times a day for 7 days. 14 Tablet 0    ondansetron hcl (ZOFRAN) 4 mg tablet Take 1 Tablet by mouth every eight (8) hours as needed for Nausea, Vomiting or Nausea or Vomiting. 10 Tablet 0    omeprazole (PRILOSEC) 20 mg capsule Take 1 Capsule by mouth daily. 10 Capsule 0    multivitamin (ONE A DAY) tablet Take 1 Tab by mouth daily.          Physical Exam:    Vitals:   Patient Vitals for the past 24 hrs:   Temp Pulse Resp BP SpO2   06/05/22 0903 98.1 °F (36.7 °C) 68 16 (!) 154/88 100 %   06/04/22 2216 98.1 °F (36.7 °C) 70 18 115/77 100 %   ·   · GEN: NAD, AAO x 4  · HEENT: NCAT, PERRLA  · HEART: S1, S2+, RRR, No murmur   · Lungs: CTA B/l, decreased at the bases, no wheeze/rhonchi   · Abdomen: soft, ND, NT, +BS   · Genitourinary: no genital discharge, no soliman  · Extremities: no edema  · Skin: no rash    Labs:   Recent Labs     06/05/22  1221 06/04/22  1043   WBC 9.8 5.7   HGB 14.8 16.9*   HCT 43.8 50.2*   * 190     Recent Labs     06/05/22  1221 06/04/22  1043   BUN 11 14 CREA 0.66 1.07*       Microbiology Data:  Urine Cx 06/04: Mixed mukul    Imaging:   CT abdo/pel 06/04: Early/partial small bowel obstruction. Assessment / Plan:     1. Trichomonas vaginitis, incidental finding on UA (06//01), asymptomatic             Continue metronidazole x 7 days        No sexual partners per pt     2. UTI, abnormal UA, Cx is pending       Agree w Levaquin pending Cx results      Routine labs in the morning      3. Early/partial small bowel obstruction       Reports intermittent abdominal discomfort since pelvic surgery for ovarian Ca        Being treated conservatively w NGT. Dr Otto Chakraborty following     4.  H/o ovarian Ca in 2015: S/p abdominal hysterectomy, and b/l oophorectomy      Will check Ca 125    Susanne Pompa MD     6/5/2022

## 2022-06-05 NOTE — PROGRESS NOTES
Problem: Falls - Risk of  Goal: *Absence of Falls  Description: Document Amandeep Mccarthy Fall Risk and appropriate interventions in the flowsheet.   Outcome: Progressing Towards Goal  Note: Fall Risk Interventions:            Medication Interventions: Bed/chair exit alarm,Patient to call before getting OOB,Teach patient to arise slowly    Elimination Interventions: Call light in reach,Patient to call for help with toileting needs

## 2022-06-05 NOTE — PROGRESS NOTES
Reason for Admission: abd pain                      RUR Score:  11%                   Plan for utilizing home health: Would allow if ordered though denies PCP      PCP: First and Last name:  Other, MD Dana  [address] [facility]   Name of Practice:    Are you a current patient: Yes/No:    Approximate date of last visit:    Can you participate in a virtual visit with your PCP:                     Current Advanced Directive/Advance Care Plan: Full Code      Healthcare Decision Maker:   Click here to complete [address] including selection of the Healthcare Decision Maker Relationship (ie \"Primary\")         Renetta Hernandez@ [phone](Daughter)                    Transition of Care Plan:  Pt is expected to return home where she lives with her daughter. Renetta Hernandez@ [phone] will transport at [place]. Pt reports that she uses a came for ambulation. Denies all other DME. She denies a PCP.   Obtains meds from [facility] in [place]

## 2022-06-05 NOTE — PROGRESS NOTES
Problem: Falls - Risk of  Goal: *Absence of Falls  Description: Document Varun Ralphs Fall Risk and appropriate interventions in the flowsheet.   Outcome: Progressing Towards Goal  Note: Fall Risk Interventions:            Medication Interventions: Bed/chair exit alarm    Elimination Interventions: Patient to call for help with toileting needs              Problem: Patient Education: Go to Patient Education Activity  Goal: Patient/Family Education  Outcome: Progressing Towards Goal     Problem: General Medical Care Plan  Goal: *Vital signs within specified parameters  Outcome: Progressing Towards Goal  Goal: *Labs within defined limits  Outcome: Progressing Towards Goal  Goal: *Absence of infection signs and symptoms  Outcome: Progressing Towards Goal  Goal: *Optimal pain control at patient's stated goal  Outcome: Progressing Towards Goal  Goal: *Skin integrity maintained  Outcome: Progressing Towards Goal  Goal: *Fluid volume balance  Outcome: Progressing Towards Goal  Goal: *Optimize nutritional status  Outcome: Progressing Towards Goal  Goal: *Anxiety reduced or absent  Outcome: Progressing Towards Goal  Goal: *Progressive mobility and function (eg: ADL's)  Outcome: Progressing Towards Goal     Problem: Patient Education: Go to Patient Education Activity  Goal: Patient/Family Education  Outcome: Progressing Towards Goal     Problem: Patient Education: Go to Patient Education Activity  Goal: Patient/Family Education  Outcome: Progressing Towards Goal     Problem: Small Bowel Obstruction: Day 1  Goal: Off Pathway (Use only if patient is Off Pathway)  Outcome: Progressing Towards Goal  Goal: Activity/Safety  Outcome: Progressing Towards Goal  Goal: Consults, if ordered  Outcome: Progressing Towards Goal  Goal: Diagnostic Test/Procedures  Outcome: Progressing Towards Goal  Goal: Nutrition/Diet  Outcome: Progressing Towards Goal  Goal: Medications  Outcome: Progressing Towards Goal  Goal: Respiratory  Outcome: Progressing Towards Goal  Goal: Treatments/Interventions/Procedures  Outcome: Progressing Towards Goal  Goal: Psychosocial  Outcome: Progressing Towards Goal  Goal: *Optimal pain control at patient's stated goal  Outcome: Progressing Towards Goal  Goal: *Adequate urinary output (equal to or greater than 30 milliliters/hour)  Outcome: Progressing Towards Goal  Goal: *Hemodynamically stable  Outcome: Progressing Towards Goal  Goal: *Demonstrates progressive activity  Outcome: Progressing Towards Goal  Goal: *Absence of nausea/vomiting  Outcome: Progressing Towards Goal     Problem: Small Bowel Obstruction: Day 2  Goal: Off Pathway (Use only if patient is Off Pathway)  Outcome: Progressing Towards Goal  Goal: Activity/Safety  Outcome: Progressing Towards Goal  Goal: Consults, if ordered  Outcome: Progressing Towards Goal  Goal: Diagnostic Test/Procedures  Outcome: Progressing Towards Goal  Goal: Nutrition/Diet  Outcome: Progressing Towards Goal  Goal: Discharge Planning  Outcome: Progressing Towards Goal  Goal: Medications  Outcome: Progressing Towards Goal  Goal: Respiratory  Outcome: Progressing Towards Goal  Goal: Treatments/Interventions/Procedures  Outcome: Progressing Towards Goal  Goal: Psychosocial  Outcome: Progressing Towards Goal  Goal: *Optimal pain control at patient's stated goal  Outcome: Progressing Towards Goal  Goal: *Adequate urinary output (equal to or greater than 30 milliliters/hour)  Outcome: Progressing Towards Goal  Goal: *Hemodynamically stable  Outcome: Progressing Towards Goal  Goal: *Demonstrates progressive activity  Outcome: Progressing Towards Goal  Goal: *Absence of nausea/vomiting  Outcome: Progressing Towards Goal  Goal: *Return of normal bowel function  Outcome: Progressing Towards Goal     Problem: Small Bowel Obstruction: Day 3  Goal: Off Pathway (Use only if patient is Off Pathway)  Outcome: Progressing Towards Goal  Goal: Activity/Safety  Outcome: Progressing Towards Goal  Goal: Consults, if ordered  Outcome: Progressing Towards Goal  Goal: Diagnostic Test/Procedures  Outcome: Progressing Towards Goal  Goal: Nutrition/Diet  Outcome: Progressing Towards Goal  Goal: Discharge Planning  Outcome: Progressing Towards Goal  Goal: Medications  Outcome: Progressing Towards Goal  Goal: Respiratory  Outcome: Progressing Towards Goal  Goal: Treatments/Interventions/Procedures  Outcome: Progressing Towards Goal  Goal: Psychosocial  Outcome: Progressing Towards Goal  Goal: *Optimal pain control at patient's stated goal  Outcome: Progressing Towards Goal  Goal: *Adequate urinary output (equal to or greater than 30 milliliters/hour)  Outcome: Progressing Towards Goal  Goal: *Hemodynamically stable  Outcome: Progressing Towards Goal  Goal: *Adequate nutrition  Outcome: Progressing Towards Goal  Goal: *Demonstrates progressive activity  Outcome: Progressing Towards Goal  Goal: *Participates in discharge planning  Outcome: Progressing Towards Goal     Problem: Small Bowel Obstruction: Day 4 to Discharge  Goal: Off Pathway (Use only if patient is Off Pathway)  Outcome: Progressing Towards Goal  Goal: Activity/Safety  Outcome: Progressing Towards Goal  Goal: Nutrition/Diet  Outcome: Progressing Towards Goal  Goal: Discharge Planning  Outcome: Progressing Towards Goal  Goal: Medications  Outcome: Progressing Towards Goal  Goal: Respiratory  Outcome: Progressing Towards Goal  Goal: Treatments/Interventions/Procedures  Outcome: Progressing Towards Goal  Goal: Psychosocial  Outcome: Progressing Towards Goal     Problem: Small Bowel Obstruction - Non Surgical: Discharge Outcomes  Goal: *Hemodynamically stable  Outcome: Progressing Towards Goal  Goal: *Demonstrates independent activity or return to baseline  Outcome: Progressing Towards Goal  Goal: *Optimal pain control at patient's stated goal  Outcome: Progressing Towards Goal  Goal: *Verbalizes understanding and describes prescribed diet  Outcome: Progressing Towards Goal  Goal: *Tolerating diet  Outcome: Progressing Towards Goal  Goal: *Verbalizes name, dosage, time, side effects, and number of days to continue medications  Outcome: Progressing Towards Goal  Goal: *Anxiety reduced or absent  Outcome: Progressing Towards Goal  Goal: *Understands and describes signs and symptoms to report to providers(Stroke Metric)  Outcome: Progressing Towards Goal  Goal: *Describes follow-up/return visits to physicians  Outcome: Progressing Towards Goal  Goal: *Describes available resources and support systems  Outcome: Progressing Towards Goal  Goal: *Active bowel function  Outcome: Progressing Towards Goal

## 2022-06-05 NOTE — PROGRESS NOTES
Hospitalist Progress Note    Subjective:   Daily Progress Note: 6/5/2022 11:31 AM    Hospital Course:  Edy Arango is a 59-year-old female with a PMHx significant for ovarian cancer s/p hysterectomy & oophorectomy and hypertension who is presenting to the ED for persistent nausea and vomiting. She was recently seen at [facility] on 06/01 where she initially presented with n/v/abd pain. She was given Bactrim for UTI and PPI. Returned today for obstipation, n/v, abd pain. She does report bowel movement on 06/03. She is being followed by Elver Moran/Yovani for Breast Lesion. Cancer resection in 2016 and is in remission now. In the ED, hemodynamically stable. No BRBPR or hematemesis. CBC and CMP unremarkable other than slightly elevated calcium level. CT abd/pelvis showing early/partial SBO. General surgery consult. NG tube placed. Urinalysis from 06/01 positive for leukocyte esterase and trichomonas. Started on IV Flagyl and Levaquin. ID consult. Subjective:    Patient seen and examined at bedside. She has not passed flatus. She is complaining of bilateral flank pain.      Current Facility-Administered Medications   Medication Dose Route Frequency    benzocaine-menthoL (CEPACOL) lozenge 1 Lozenge  1 Lozenge Mucous Membrane PRN    metroNIDAZOLE (FLAGYL) IVPB premix 500 mg  500 mg IntraVENous Q12H    levoFLOXacin (LEVAQUIN) 500 mg in D5W IVPB  500 mg IntraVENous Q24H    sodium chloride (NS) flush 5-40 mL  5-40 mL IntraVENous Q8H    sodium chloride (NS) flush 5-40 mL  5-40 mL IntraVENous PRN    acetaminophen (TYLENOL) tablet 650 mg  650 mg Oral Q6H PRN    Or    acetaminophen (TYLENOL) suppository 650 mg  650 mg Rectal Q6H PRN    polyethylene glycol (MIRALAX) packet 17 g  17 g Oral DAILY PRN    ondansetron (ZOFRAN ODT) tablet 4 mg  4 mg Oral Q8H PRN    Or    ondansetron (ZOFRAN) injection 4 mg  4 mg IntraVENous Q6H PRN    enoxaparin (LOVENOX) injection 40 mg  40 mg SubCUTAneous DAILY    lactated Ringers infusion  100 mL/hr IntraVENous CONTINUOUS    amLODIPine (NORVASC) tablet 5 mg  5 mg Oral DAILY    gabapentin (NEURONTIN) capsule 100 mg  100 mg Oral DAILY    venlafaxine-SR (EFFEXOR-XR) capsule 37.5 mg  37.5 mg Oral DAILY    famotidine (PF) (PEPCID) 20 mg in 0.9% sodium chloride 10 mL injection  20 mg IntraVENous Q12H    HYDROmorphone (DILAUDID) injection 1 mg  1 mg IntraVENous Q4H PRN        Review of Systems  Constitutional: No fevers, No chills, No sweats, No fatigue, No Weakness  Eyes: No redness  Ears, nose, mouth, throat, and face: No nasal congestion, No sore throat, No voice change  Respiratory: No Shortness of Breath, No cough, No wheezing  Cardiovascular: No chest pain, No palpitations, No extremity edema  Gastrointestinal: + nausea, + vomiting, + abdominal pain, No diarrhea. Genitourinary: + flank pain, No frequency, No dysuria, No hematuria  Integument/breast: No skin lesion(s)   Neurological: No Confusion, No headaches, No dizziness      Objective:     Visit Vitals  BP (!) 154/88 (BP Patient Position: At rest)   Pulse 68   Temp 98.1 °F (36.7 °C)   Resp 16   Ht 5' 7\" (1.702 m)   Wt 58.2 kg (128 lb 4.9 oz)   SpO2 100%   BMI 20.10 kg/m²      O2 Device: None (Room air)    Temp (24hrs), Av.9 °F (36.6 °C), Min:97.6 °F (36.4 °C), Max:98.1 °F (36.7 °C)      701 - 1900  In: -   Out: 50    190 -  0700  In: -   Out: 1000     PHYSICAL EXAM:  Constitutional: Ill-appearing. No acute distress  Skin: Extremities and face reveal no rashes. HEENT: NG tube. Sclerae anicteric. PERRL. No oral ulcers. The neck is supple and no masses. Cardiovascular: Regular rate and rhythm. +S1/S2. No murmur or gallop. Respiratory:  Clear breath sounds bilaterally with no wheezes, rales, or rhonchi. GI: Abdomen nondistended, soft. Mildly tender. Hypoactive bowel sounds. Rectal: Deferred   Musculoskeletal: No pitting edema of the lower legs.  Able to move all ext  Neurological:  Patient is alert and oriented x3. Cranial nerves II-XII grossly intact  Psychiatric: Mood appears appropriate       Data Review    No results found for this or any previous visit (from the past 24 hour(s)). CT ABD PELV W CONT   Final Result   Early/partial small bowel obstruction. XR ABD FLAT/ ERECT    (Results Pending)       Active Problems:    SBO (small bowel obstruction) ([address] (6/4/2022)        Assessment/Plan:     1. Subacute SBO (Hx of Abd Surgery for Ovarian CA)  - NG tube   - NPO  - C/s Surgery  - IVF  - IV Pepcid  - Sx control     2. UTI  - UA positive for leukocyte esterase and trichomonas  - IV flagyl and levaquin  - IV fluids  - ID consult  - Urine culture pending    3. HTN  - Norvasc 5 mg    4. Ovarian CA in Remission  - Followed by Dolly Moran/Yovani for Breast Lesion  - Cancer resection in 2016 and is in remission    5. Anxiety  - Continue Effexor    DVT Prophylaxis: Lovenox  Code Status: Full  POA:    Discharge Barriers:   - Resolution partial SBO   - NG tube    - ID consult     Care Plan discussed with: patient and nursing     Total time spent with patient: >35 minutes.

## 2022-06-06 ENCOUNTER — APPOINTMENT (OUTPATIENT)
Dept: CT IMAGING | Age: 52
DRG: 247 | End: 2022-06-06
Attending: SURGERY
Payer: MEDICAID

## 2022-06-06 LAB
ALBUMIN SERPL-MCNC: 3.6 G/DL (ref 3.5–5)
ALBUMIN/GLOB SERPL: 0.9 {RATIO} (ref 1.1–2.2)
ALP SERPL-CCNC: 79 U/L (ref 45–117)
ALT SERPL-CCNC: 11 U/L (ref 12–78)
ANION GAP SERPL CALC-SCNC: 7 MMOL/L (ref 5–15)
AST SERPL W P-5'-P-CCNC: 11 U/L (ref 15–37)
BASOPHILS # BLD: 0 K/UL (ref 0–0.1)
BASOPHILS NFR BLD: 0 % (ref 0–1)
BILIRUB SERPL-MCNC: 0.8 MG/DL (ref 0.2–1)
BUN SERPL-MCNC: 15 MG/DL (ref 6–20)
BUN/CREAT SERPL: 23 (ref 12–20)
CA-I BLD-MCNC: 10.5 MG/DL (ref 8.5–10.1)
CANCER AG125 SERPL-ACNC: 2 U/ML (ref 1.5–35)
CHLORIDE SERPL-SCNC: 104 MMOL/L (ref 97–108)
CO2 SERPL-SCNC: 27 MMOL/L (ref 21–32)
CREAT SERPL-MCNC: 0.64 MG/DL (ref 0.55–1.02)
CRP SERPL-MCNC: 5.23 MG/DL (ref 0–0.6)
DIFFERENTIAL METHOD BLD: ABNORMAL
EOSINOPHIL # BLD: 0 K/UL (ref 0–0.4)
EOSINOPHIL NFR BLD: 0 % (ref 0–7)
ERYTHROCYTE [DISTWIDTH] IN BLOOD BY AUTOMATED COUNT: 12.2 % (ref 11.5–14.5)
GLOBULIN SER CALC-MCNC: 3.8 G/DL (ref 2–4)
GLUCOSE SERPL-MCNC: 95 MG/DL (ref 65–100)
HCT VFR BLD AUTO: 42.4 % (ref 35–47)
HGB BLD-MCNC: 14.4 G/DL (ref 11.5–16)
IMM GRANULOCYTES # BLD AUTO: 0 K/UL (ref 0–0.04)
IMM GRANULOCYTES NFR BLD AUTO: 0 % (ref 0–0.5)
LYMPHOCYTES # BLD: 1.3 K/UL (ref 0.8–3.5)
LYMPHOCYTES NFR BLD: 15 % (ref 12–49)
MCH RBC QN AUTO: 32.4 PG (ref 26–34)
MCHC RBC AUTO-ENTMCNC: 34 G/DL (ref 30–36.5)
MCV RBC AUTO: 95.5 FL (ref 80–99)
MONOCYTES # BLD: 0.8 K/UL (ref 0–1)
MONOCYTES NFR BLD: 9 % (ref 5–13)
NEUTS SEG # BLD: 6.6 K/UL (ref 1.8–8)
NEUTS SEG NFR BLD: 76 % (ref 32–75)
NRBC # BLD: 0 K/UL (ref 0–0.01)
NRBC BLD-RTO: 0 PER 100 WBC
PLATELET # BLD AUTO: 135 K/UL (ref 150–400)
PMV BLD AUTO: 12.2 FL (ref 8.9–12.9)
POTASSIUM SERPL-SCNC: 3.6 MMOL/L (ref 3.5–5.1)
PROCALCITONIN SERPL-MCNC: <0.05 NG/ML
PROT SERPL-MCNC: 7.4 G/DL (ref 6.4–8.2)
RBC # BLD AUTO: 4.44 M/UL (ref 3.8–5.2)
SODIUM SERPL-SCNC: 138 MMOL/L (ref 136–145)
WBC # BLD AUTO: 8.8 K/UL (ref 3.6–11)

## 2022-06-06 PROCEDURE — 74011250636 HC RX REV CODE- 250/636: Performed by: STUDENT IN AN ORGANIZED HEALTH CARE EDUCATION/TRAINING PROGRAM

## 2022-06-06 PROCEDURE — 99232 SBSQ HOSP IP/OBS MODERATE 35: CPT | Performed by: INTERNAL MEDICINE

## 2022-06-06 PROCEDURE — 74011000250 HC RX REV CODE- 250: Performed by: INTERNAL MEDICINE

## 2022-06-06 PROCEDURE — 99232 SBSQ HOSP IP/OBS MODERATE 35: CPT | Performed by: SURGERY

## 2022-06-06 PROCEDURE — 84145 PROCALCITONIN (PCT): CPT

## 2022-06-06 PROCEDURE — 65270000029 HC RM PRIVATE

## 2022-06-06 PROCEDURE — 86304 IMMUNOASSAY TUMOR CA 125: CPT

## 2022-06-06 PROCEDURE — 86140 C-REACTIVE PROTEIN: CPT

## 2022-06-06 PROCEDURE — 36415 COLL VENOUS BLD VENIPUNCTURE: CPT

## 2022-06-06 PROCEDURE — 80053 COMPREHEN METABOLIC PANEL: CPT

## 2022-06-06 PROCEDURE — 74011250636 HC RX REV CODE- 250/636: Performed by: INTERNAL MEDICINE

## 2022-06-06 PROCEDURE — 74011250637 HC RX REV CODE- 250/637: Performed by: PHYSICIAN ASSISTANT

## 2022-06-06 PROCEDURE — 74176 CT ABD & PELVIS W/O CONTRAST: CPT

## 2022-06-06 PROCEDURE — 74011250636 HC RX REV CODE- 250/636: Performed by: SURGERY

## 2022-06-06 PROCEDURE — 85025 COMPLETE CBC W/AUTO DIFF WBC: CPT

## 2022-06-06 RX ADMIN — KETOROLAC TROMETHAMINE 30 MG: 30 INJECTION, SOLUTION INTRAMUSCULAR; INTRAVENOUS at 02:28

## 2022-06-06 RX ADMIN — METRONIDAZOLE 500 MG: 500 INJECTION, SOLUTION INTRAVENOUS at 13:03

## 2022-06-06 RX ADMIN — SODIUM CHLORIDE, PRESERVATIVE FREE 10 ML: 5 INJECTION INTRAVENOUS at 06:40

## 2022-06-06 RX ADMIN — SODIUM CHLORIDE, POTASSIUM CHLORIDE, SODIUM LACTATE AND CALCIUM CHLORIDE 100 ML/HR: 600; 310; 30; 20 INJECTION, SOLUTION INTRAVENOUS at 16:01

## 2022-06-06 RX ADMIN — BENZOCAINE AND MENTHOL 1 LOZENGE: 15; 3.6 LOZENGE ORAL at 00:02

## 2022-06-06 RX ADMIN — HYDROMORPHONE HYDROCHLORIDE 1 MG: 1 INJECTION, SOLUTION INTRAMUSCULAR; INTRAVENOUS; SUBCUTANEOUS at 14:29

## 2022-06-06 RX ADMIN — HYDROMORPHONE HYDROCHLORIDE 1 MG: 1 INJECTION, SOLUTION INTRAMUSCULAR; INTRAVENOUS; SUBCUTANEOUS at 20:35

## 2022-06-06 RX ADMIN — LEVOFLOXACIN 500 MG: 5 INJECTION, SOLUTION INTRAVENOUS at 09:38

## 2022-06-06 RX ADMIN — SODIUM CHLORIDE, PRESERVATIVE FREE 10 ML: 5 INJECTION INTRAVENOUS at 14:34

## 2022-06-06 RX ADMIN — SODIUM CHLORIDE, PRESERVATIVE FREE 20 MG: 5 INJECTION INTRAVENOUS at 09:38

## 2022-06-06 RX ADMIN — METRONIDAZOLE 500 MG: 500 INJECTION, SOLUTION INTRAVENOUS at 22:28

## 2022-06-06 RX ADMIN — SODIUM CHLORIDE, PRESERVATIVE FREE 10 ML: 5 INJECTION INTRAVENOUS at 22:28

## 2022-06-06 RX ADMIN — SODIUM CHLORIDE, PRESERVATIVE FREE 20 MG: 5 INJECTION INTRAVENOUS at 20:35

## 2022-06-06 RX ADMIN — HYDROMORPHONE HYDROCHLORIDE 1 MG: 1 INJECTION, SOLUTION INTRAMUSCULAR; INTRAVENOUS; SUBCUTANEOUS at 10:08

## 2022-06-06 RX ADMIN — ENOXAPARIN SODIUM 40 MG: 100 INJECTION SUBCUTANEOUS at 09:38

## 2022-06-06 NOTE — PROGRESS NOTES
Hospitalist Progress Note            Daily Progress Note: 6/6/2022 2:13 PM  Hospital course:     Carie Hernandez is a 45year-old female with a PMHx significant for ovarian cancer s/p hysterectomy & oophorectomy and hypertension who is presenting to the ED for persistent nausea and vomiting. She was recently seen at [facility] on 06/01 where she initially presented with n/v/abd pain. She was given Bactrim for UTI and PPI. Returned today for obstipation, n/v, abd pain. She does report bowel movement on 06/03. She is being followed by Queenie Moran/Yovani for Breast Lesion. Cancer resection in 2016 and is in remission now. In the ED, hemodynamically stable. No BRBPR or hematemesis. CBC and CMP unremarkable other than slightly elevated calcium level. CT abd/pelvis showing early/partial SBO. General surgery consult. NG tube placed. Urinalysis from 06/01 positive for leukocyte esterase and trichomonas. Started on IV Flagyl and Levaquin. ID consult. Repeat CT of chest and abdomen without change. Subjective:     Examined patient at the bedside. Continues to have abdominal discomfort. No other complaints. Assessment/Plan:   Active Problems:    SBO (small bowel obstruction) ([address] (6/4/2022)      1. Subacute SBO (Hx of Abd Surgery for Ovarian CA)  - NG tube   - NPO  - Surgery following  - IVF  - IV Pepcid  - Sx control  -Repeat CT without change     2. UTI  - UA positive for leukocyte esterase and trichomonas  - IV flagyl and levaquin  - IV fluids  - ID following  - Urine culture pending     3. HTN  - Norvasc 5 mg     4. Ovarian CA in Remission  - Followed by Queenie Moran/Yovani for Breast Lesion  - Cancer resection in 2016 and is in remission     5.  Anxiety  - Continue Effexor      DVT Prophylaxis: Lovenox  Code Status: Full Code  POA/NOK:    Disposition and discharge barriers:    SBO resolution   UTI, cultures pending  Care Plan discussed with: Patient, family member, staff, IDR team    Current Facility-Administered Medications   Medication Dose Route Frequency    benzocaine-menthoL (CEPACOL) lozenge 1 Lozenge  1 Lozenge Mucous Membrane PRN    metroNIDAZOLE (FLAGYL) IVPB premix 500 mg  500 mg IntraVENous Q12H    levoFLOXacin (LEVAQUIN) 500 mg in D5W IVPB  500 mg IntraVENous Q24H    ketorolac (TORADOL) injection 30 mg  30 mg IntraVENous Q6H PRN    sodium chloride (NS) flush 5-40 mL  5-40 mL IntraVENous Q8H    sodium chloride (NS) flush 5-40 mL  5-40 mL IntraVENous PRN    acetaminophen (TYLENOL) tablet 650 mg  650 mg Oral Q6H PRN    Or    acetaminophen (TYLENOL) suppository 650 mg  650 mg Rectal Q6H PRN    polyethylene glycol (MIRALAX) packet 17 g  17 g Oral DAILY PRN    ondansetron (ZOFRAN ODT) tablet 4 mg  4 mg Oral Q8H PRN    Or    ondansetron (ZOFRAN) injection 4 mg  4 mg IntraVENous Q6H PRN    enoxaparin (LOVENOX) injection 40 mg  40 mg SubCUTAneous DAILY    lactated Ringers infusion  100 mL/hr IntraVENous CONTINUOUS    amLODIPine (NORVASC) tablet 5 mg  5 mg Oral DAILY    gabapentin (NEURONTIN) capsule 100 mg  100 mg Oral DAILY    venlafaxine-SR (EFFEXOR-XR) capsule 37.5 mg  37.5 mg Oral DAILY    famotidine (PF) (PEPCID) 20 mg in 0.9% sodium chloride 10 mL injection  20 mg IntraVENous Q12H    HYDROmorphone (DILAUDID) injection 1 mg  1 mg IntraVENous Q4H PRN        REVIEW OF SYSTEMS    Review of Systems   Constitutional: Positive for weight loss. Respiratory: Negative for cough. Cardiovascular: Negative for chest pain. Gastrointestinal: Positive for abdominal pain. Genitourinary: Positive for dysuria. Musculoskeletal: Positive for myalgias. Psychiatric/Behavioral: The patient is nervous/anxious.          Objective:     Visit Vitals  BP (!) 158/91 (BP 1 Location: Right upper arm, BP Patient Position: At rest)   Pulse (!) 58   Temp 98.3 °F (36.8 °C)   Resp 18   Ht 5' 7\" (1.702 m)   Wt 58.2 kg (128 lb 4.9 oz)   SpO2 100%   BMI 20.10 kg/m²      O2 Device: None (Room air)    Temp (24hrs), Av.8 °F (37.1 °C), Min:98.3 °F (36.8 °C), Max:99.3 °F (37.4 °C)      No intake/output data recorded.  1901 -  0700  In: -   Out: 1450     PHYSICAL EXAM:    Physical Exam  Constitutional:       Appearance: She is ill-appearing. HENT:      Nose: No congestion. Cardiovascular:      Rate and Rhythm: Regular rhythm. Bradycardia present. Pulmonary:      Effort: No respiratory distress. Abdominal:      General: There is distension. Tenderness: There is abdominal tenderness. Musculoskeletal:         General: Normal range of motion. Skin:     General: Skin is warm. Data Review    Recent Results (from the past 24 hour(s))   CBC WITH AUTOMATED DIFF    Collection Time: 22 10:31 AM   Result Value Ref Range    WBC 8.8 3.6 - 11.0 K/uL    RBC 4.44 3.80 - 5.20 M/uL    HGB 14.4 11.5 - 16.0 g/dL    HCT 42.4 35.0 - 47.0 %    MCV 95.5 80.0 - 99.0 FL    MCH 32.4 26.0 - 34.0 PG    MCHC 34.0 30.0 - 36.5 g/dL    RDW 12.2 11.5 - 14.5 %    PLATELET 630 (L) 624 - 400 K/uL    MPV 12.2 8.9 - 12.9 FL    NRBC 0.0 0.0  WBC    ABSOLUTE NRBC 0.00 0.00 - 0.01 K/uL    NEUTROPHILS 76 (H) 32 - 75 %    LYMPHOCYTES 15 12 - 49 %    MONOCYTES 9 5 - 13 %    EOSINOPHILS 0 0 - 7 %    BASOPHILS 0 0 - 1 %    IMMATURE GRANULOCYTES 0 0 - 0.5 %    ABS. NEUTROPHILS 6.6 1.8 - 8.0 K/UL    ABS. LYMPHOCYTES 1.3 0.8 - 3.5 K/UL    ABS. MONOCYTES 0.8 0.0 - 1.0 K/UL    ABS. EOSINOPHILS 0.0 0.0 - 0.4 K/UL    ABS. BASOPHILS 0.0 0.0 - 0.1 K/UL    ABS. IMM.  GRANS. 0.0 0.00 - 0.04 K/UL    DF AUTOMATED     C REACTIVE PROTEIN, QT    Collection Time: 22 10:31 AM   Result Value Ref Range    C-Reactive protein 5.23 (H) 0.00 - 0.60 mg/dL   PROCALCITONIN    Collection Time: 22 10:31 AM   Result Value Ref Range    Procalcitonin <0.05 (H) 0 ng/mL   METABOLIC PANEL, COMPREHENSIVE    Collection Time: 22 10:31 AM   Result Value Ref Range    Sodium 138 136 - 145 mmol/L    Potassium 3.6 3.5 - 5.1 mmol/L Chloride 104 97 - 108 mmol/L    CO2 27 21 - 32 mmol/L    Anion gap 7 5 - 15 mmol/L    Glucose 95 65 - 100 mg/dL    BUN 15 6 - 20 mg/dL    Creatinine 0.64 0.55 - 1.02 mg/dL    BUN/Creatinine ratio 23 (H) 12 - 20      GFR est AA >60 >60 ml/min/1.73m2    GFR est non-AA >60 >60 ml/min/1.73m2    Calcium 10.5 (H) 8.5 - 10.1 mg/dL    Bilirubin, total 0.8 0.2 - 1.0 mg/dL    AST (SGOT) 11 (L) 15 - 37 U/L    ALT (SGPT) 11 (L) 12 - 78 U/L    Alk. phosphatase 79 45 - 117 U/L    Protein, total 7.4 6.4 - 8.2 g/dL    Albumin 3.6 3.5 - 5.0 g/dL    Globulin 3.8 2.0 - 4.0 g/dL    A-G Ratio 0.9 (L) 1.1 - 2.2         CT ABD PELV WO CONT   Final Result   Redemonstration of the distended small bowel loops. .         XR ABD FLAT/ ERECT   Final Result   NG tube position as above. Early/partial small bowel obstruction. CT ABD PELV W CONT   Final Result   Early/partial small bowel obstruction. _____________________________________________________________________________  Time spent in direct care including coordination of service, review of data and examination: > 35 minutes    ______________________________________________________________________________    Jewels Parada NP    This is dictation was done by [employer], computer voice recognition software. Quite often unanticipated grammatical, syntax, homophones and other interpretive errors or inadvertently transcribed by the computer software. Please excuse errors that have escaped final proofreading. Thank you.

## 2022-06-06 NOTE — PROGRESS NOTES
PROGRESS NOTE      Chief Complaints:  No new complaints  HPI and  Objective:    Patient states that she feels better but she is not passing any gas. NG tube output minimal.  Abdominal examination discussed below. Review of Systems:  Rest of review of system negative, personally reviewed. EXAM:  Visit Vitals  BP (!) 138/91   Pulse 75   Temp 99.3 °F (37.4 °C)   Resp 16   Ht 5' 7\" (1.702 m)   Wt 128 lb 4.9 oz (58.2 kg)   SpO2 100%   BMI 20.10 kg/m²       Patient is awake and alert  Head and neck atraumatic, normocephalic. ENT: No hoarse voice  Cardiac system regular rate rhythm. Pulmonary no audible wheeze  Chest wall excursion normal with respiration cycle  Abdomen is soft not particularly distended. Neurologically nonfocal.  Skin is warm and moist.  Psychosocial: Cooperative. Vascular examination as previously noted no changes. Recent Results (from the past 24 hour(s))   METABOLIC PANEL, COMPREHENSIVE    Collection Time: 06/05/22 12:21 PM   Result Value Ref Range    Sodium 139 136 - 145 mmol/L    Potassium 3.7 3.5 - 5.1 mmol/L    Chloride 105 97 - 108 mmol/L    CO2 27 21 - 32 mmol/L    Anion gap 7 5 - 15 mmol/L    Glucose 92 65 - 100 mg/dL    BUN 11 6 - 20 mg/dL    Creatinine 0.66 0.55 - 1.02 mg/dL    BUN/Creatinine ratio 17 12 - 20      GFR est AA >60 >60 ml/min/1.73m2    GFR est non-AA >60 >60 ml/min/1.73m2    Calcium 10.6 (H) 8.5 - 10.1 mg/dL    Bilirubin, total 0.7 0.2 - 1.0 mg/dL    AST (SGOT) 10 (L) 15 - 37 U/L    ALT (SGPT) 12 12 - 78 U/L    Alk.  phosphatase 89 45 - 117 U/L    Protein, total 7.7 6.4 - 8.2 g/dL    Albumin 4.0 3.5 - 5.0 g/dL    Globulin 3.7 2.0 - 4.0 g/dL    A-G Ratio 1.1 1.1 - 2.2     CBC WITH AUTOMATED DIFF    Collection Time: 06/05/22 12:21 PM   Result Value Ref Range    WBC 9.8 3.6 - 11.0 K/uL    RBC 4.59 3.80 - 5.20 M/uL    HGB 14.8 11.5 - 16.0 g/dL    HCT 43.8 35.0 - 47.0 %    MCV 95.4 80.0 - 99.0 FL    MCH 32.2 26.0 - 34.0 PG    MCHC 33.8 30.0 - 36.5 g/dL    RDW 12.1 11.5 - 14.5 %    PLATELET 304 (L) 828 - 400 K/uL    MPV 12.0 8.9 - 12.9 FL    NRBC 0.0 0.0  WBC    ABSOLUTE NRBC 0.00 0.00 - 0.01 K/uL    NEUTROPHILS 79 (H) 32 - 75 %    LYMPHOCYTES 12 12 - 49 %    MONOCYTES 9 5 - 13 %    EOSINOPHILS 0 0 - 7 %    BASOPHILS 0 0 - 1 %    IMMATURE GRANULOCYTES 0 0 - 0.5 %    ABS. NEUTROPHILS 7.7 1.8 - 8.0 K/UL    ABS. LYMPHOCYTES 1.1 0.8 - 3.5 K/UL    ABS. MONOCYTES 0.8 0.0 - 1.0 K/UL    ABS. EOSINOPHILS 0.0 0.0 - 0.4 K/UL    ABS. BASOPHILS 0.0 0.0 - 0.1 K/UL    ABS. IMM. GRANS. 0.0 0.00 - 0.04 K/UL    DF AUTOMATED     MAGNESIUM    Collection Time: 06/05/22 12:21 PM   Result Value Ref Range    Magnesium 1.8 1.6 - 2.4 mg/dL   PHOSPHORUS    Collection Time: 06/05/22 12:21 PM   Result Value Ref Range    Phosphorus 3.0 2.6 - 4.7 mg/dL       ASSESSMENT:   Patient is 45 y.o. with diagnosis of : Active Problems:    SBO (small bowel obstruction) ([facility] [address] (6/4/2022)        PLAN:                 Abdomen is pretty soft today and not distended. Patient still not passing any gas. I will order CT scan abdomen pelvis follow-up small bowel obstruction. If the CT scan looks better we will try clear liquid diet.

## 2022-06-06 NOTE — PROGRESS NOTES
Report given to Randall Ware RN oncoming nurse to include sbar, mar, kardex, recent orders and changes;

## 2022-06-06 NOTE — PROGRESS NOTES
Infectious Disease Progress Note          Subjective:   Pt seen and examined at bedside, abdominal pain is better, passed gas. Repeat CT abdo/pel today revealed dilated bowel loops   Objective:   Physical Exam:     Visit Vitals  BP (!) 158/91 (BP 1 Location: Right upper arm, BP Patient Position: At rest)   Pulse (!) 58   Temp 98.3 °F (36.8 °C)   Resp 18   Ht 5' 7\" (1.702 m)   Wt 128 lb 4.9 oz (58.2 kg)   SpO2 100%   BMI 20.10 kg/m²      O2 Device: None (Room air)    Temp (24hrs), Av.8 °F (37.1 °C), Min:98.3 °F (36.8 °C), Max:99.3 °F (37.4 °C)    No intake/output data recorded.     07 -  1900  In: -   Out: 850     General: NAD, AAO x 4  HEENT: MERLYN, Moist mucosa, NGT in place   Lungs: CTA b/l, decreased at the bases   Heart: S1S2+, RRR, no murmur  Abdo: Soft, NT, ND, +BS, no organomegaly   :  No soliman cath   Exts: No edema, + pulses b/l   Skin: No wounds, No rashes or lesions      Data Review:       Recent Days:  Recent Labs     22  1031 22  1221 22  1043   WBC 8.8 9.8 5.7   HGB 14.4 14.8 16.9*   HCT 42.4 43.8 50.2*   * 148* 190     Recent Labs     22  1031 22  1221 22  1043   BUN 15 11 14   CREA 0.64 0.66 1.07*       Lab Results   Component Value Date/Time    C-Reactive protein 5.23 (H) 2022 10:31 AM          Microbiology     Results     Procedure Component Value Units Date/Time    CULTURE, URINE [[id number]] Collected: 22 1030    Order Status: Completed Specimen: Urine Updated: 22 0859     Special Requests: --        No Special Requests  Reflexed from [id number]       [place]ff[place] Count 15,000        Effie Count colonies/ml        Culture result:       Mixed urogenital mukul isolated          INFLUENZA A & B AG (RAPID TEST) [[id number]] Collected: 22 1035    Order Status: Completed Specimen: Nasopharyngeal from Nasal washing Updated: 22 1107     Influenza A Antigen Negative        Influenza B Antigen Negative Diagnostics   CXR Results  (Last 48 hours)    None             Assessment/Plan     1. Trichomonas vaginitis, incidental finding on UA (06//01), asymptomatic             On a 7 day course of metronidazole, Day # 2/7       Still no vaginal discharge or discomfort      2.  UTI, abnormal UA, Mixed urogenital mukul isolated from Cx    3.  Early/partial small bowel obstruction, NGT remains in place        Dilated bowel loops on repeat CT abdo/pel today         Abdomen pain is better, abdomen soft on exam, passed gas today      4. H/o ovarian Ca in 2015: S/p abdominal hysterectomy, and b/l oophorectomy      Ca 125 is pending, will follow      Josh Sanchez MD    6/6/2022

## 2022-06-06 NOTE — PROGRESS NOTES
Patient stating that her pain is starting to increase however next prn dilaudid dose unavailable until 2100. Dr. Maurice Re notified and orders received for toradol 30mg IV every 6 hours for moderate or breakthrough pain. Orders entered as received.

## 2022-06-07 ENCOUNTER — ANESTHESIA EVENT (OUTPATIENT)
Dept: SURGERY | Age: 52
DRG: 247 | End: 2022-06-07
Payer: MEDICAID

## 2022-06-07 LAB
ANION GAP SERPL CALC-SCNC: 5 MMOL/L (ref 5–15)
BASOPHILS # BLD: 0 K/UL (ref 0–0.1)
BASOPHILS NFR BLD: 0 % (ref 0–1)
BUN SERPL-MCNC: 17 MG/DL (ref 6–20)
BUN/CREAT SERPL: 23 (ref 12–20)
CA-I BLD-MCNC: 10.3 MG/DL (ref 8.5–10.1)
CHLORIDE SERPL-SCNC: 104 MMOL/L (ref 97–108)
CO2 SERPL-SCNC: 29 MMOL/L (ref 21–32)
CREAT SERPL-MCNC: 0.75 MG/DL (ref 0.55–1.02)
DIFFERENTIAL METHOD BLD: NORMAL
EOSINOPHIL # BLD: 0.1 K/UL (ref 0–0.4)
EOSINOPHIL NFR BLD: 1 % (ref 0–7)
ERYTHROCYTE [DISTWIDTH] IN BLOOD BY AUTOMATED COUNT: 12 % (ref 11.5–14.5)
GLUCOSE SERPL-MCNC: 85 MG/DL (ref 65–100)
HCT VFR BLD AUTO: 42.1 % (ref 35–47)
HGB BLD-MCNC: 13.8 G/DL (ref 11.5–16)
IMM GRANULOCYTES # BLD AUTO: 0 K/UL (ref 0–0.04)
IMM GRANULOCYTES NFR BLD AUTO: 0 % (ref 0–0.5)
LYMPHOCYTES # BLD: 1.2 K/UL (ref 0.8–3.5)
LYMPHOCYTES NFR BLD: 21 % (ref 12–49)
MCH RBC QN AUTO: 31.8 PG (ref 26–34)
MCHC RBC AUTO-ENTMCNC: 32.8 G/DL (ref 30–36.5)
MCV RBC AUTO: 97 FL (ref 80–99)
MONOCYTES # BLD: 0.7 K/UL (ref 0–1)
MONOCYTES NFR BLD: 11 % (ref 5–13)
NEUTS SEG # BLD: 3.9 K/UL (ref 1.8–8)
NEUTS SEG NFR BLD: 67 % (ref 32–75)
NRBC # BLD: 0 K/UL (ref 0–0.01)
NRBC BLD-RTO: 0 PER 100 WBC
PLATELET # BLD AUTO: 153 K/UL (ref 150–400)
PMV BLD AUTO: 11.5 FL (ref 8.9–12.9)
POTASSIUM SERPL-SCNC: 3.6 MMOL/L (ref 3.5–5.1)
RBC # BLD AUTO: 4.34 M/UL (ref 3.8–5.2)
SODIUM SERPL-SCNC: 138 MMOL/L (ref 136–145)
WBC # BLD AUTO: 5.9 K/UL (ref 3.6–11)

## 2022-06-07 PROCEDURE — 74011250636 HC RX REV CODE- 250/636: Performed by: INTERNAL MEDICINE

## 2022-06-07 PROCEDURE — 85025 COMPLETE CBC W/AUTO DIFF WBC: CPT

## 2022-06-07 PROCEDURE — 65270000029 HC RM PRIVATE

## 2022-06-07 PROCEDURE — 74011250636 HC RX REV CODE- 250/636: Performed by: NURSE PRACTITIONER

## 2022-06-07 PROCEDURE — 36415 COLL VENOUS BLD VENIPUNCTURE: CPT

## 2022-06-07 PROCEDURE — 74011250636 HC RX REV CODE- 250/636: Performed by: STUDENT IN AN ORGANIZED HEALTH CARE EDUCATION/TRAINING PROGRAM

## 2022-06-07 PROCEDURE — 74011000250 HC RX REV CODE- 250: Performed by: INTERNAL MEDICINE

## 2022-06-07 PROCEDURE — 74011250636 HC RX REV CODE- 250/636: Performed by: SURGERY

## 2022-06-07 PROCEDURE — 80048 BASIC METABOLIC PNL TOTAL CA: CPT

## 2022-06-07 PROCEDURE — 99232 SBSQ HOSP IP/OBS MODERATE 35: CPT | Performed by: SURGERY

## 2022-06-07 RX ORDER — HYDRALAZINE HYDROCHLORIDE 20 MG/ML
10 INJECTION INTRAMUSCULAR; INTRAVENOUS
Status: DISCONTINUED | OUTPATIENT
Start: 2022-06-07 | End: 2022-06-12 | Stop reason: HOSPADM

## 2022-06-07 RX ORDER — HYDRALAZINE HYDROCHLORIDE 20 MG/ML
10 INJECTION INTRAMUSCULAR; INTRAVENOUS
Status: DISCONTINUED | OUTPATIENT
Start: 2022-06-07 | End: 2022-06-07

## 2022-06-07 RX ADMIN — HYDROMORPHONE HYDROCHLORIDE 1 MG: 1 INJECTION, SOLUTION INTRAMUSCULAR; INTRAVENOUS; SUBCUTANEOUS at 16:55

## 2022-06-07 RX ADMIN — SODIUM CHLORIDE, PRESERVATIVE FREE 20 MG: 5 INJECTION INTRAVENOUS at 08:28

## 2022-06-07 RX ADMIN — LEVOFLOXACIN 500 MG: 5 INJECTION, SOLUTION INTRAVENOUS at 10:19

## 2022-06-07 RX ADMIN — SODIUM CHLORIDE, POTASSIUM CHLORIDE, SODIUM LACTATE AND CALCIUM CHLORIDE 100 ML/HR: 600; 310; 30; 20 INJECTION, SOLUTION INTRAVENOUS at 18:02

## 2022-06-07 RX ADMIN — ONDANSETRON 4 MG: 2 INJECTION INTRAMUSCULAR; INTRAVENOUS at 00:13

## 2022-06-07 RX ADMIN — SODIUM CHLORIDE, PRESERVATIVE FREE 10 ML: 5 INJECTION INTRAVENOUS at 06:08

## 2022-06-07 RX ADMIN — ENOXAPARIN SODIUM 40 MG: 100 INJECTION SUBCUTANEOUS at 08:28

## 2022-06-07 RX ADMIN — SODIUM CHLORIDE, PRESERVATIVE FREE 10 ML: 5 INJECTION INTRAVENOUS at 20:56

## 2022-06-07 RX ADMIN — KETOROLAC TROMETHAMINE 30 MG: 30 INJECTION, SOLUTION INTRAMUSCULAR; INTRAVENOUS at 00:13

## 2022-06-07 RX ADMIN — METRONIDAZOLE 500 MG: 500 INJECTION, SOLUTION INTRAVENOUS at 11:51

## 2022-06-07 RX ADMIN — KETOROLAC TROMETHAMINE 30 MG: 30 INJECTION, SOLUTION INTRAMUSCULAR; INTRAVENOUS at 21:03

## 2022-06-07 RX ADMIN — METRONIDAZOLE 500 MG: 500 INJECTION, SOLUTION INTRAVENOUS at 20:56

## 2022-06-07 RX ADMIN — SODIUM CHLORIDE, PRESERVATIVE FREE 20 MG: 5 INJECTION INTRAVENOUS at 20:55

## 2022-06-07 RX ADMIN — HYDRALAZINE HYDROCHLORIDE 10 MG: 20 INJECTION, SOLUTION INTRAMUSCULAR; INTRAVENOUS at 10:20

## 2022-06-07 RX ADMIN — HYDROMORPHONE HYDROCHLORIDE 1 MG: 1 INJECTION, SOLUTION INTRAMUSCULAR; INTRAVENOUS; SUBCUTANEOUS at 11:49

## 2022-06-07 RX ADMIN — HYDROMORPHONE HYDROCHLORIDE 1 MG: 1 INJECTION, SOLUTION INTRAMUSCULAR; INTRAVENOUS; SUBCUTANEOUS at 06:08

## 2022-06-07 RX ADMIN — SODIUM CHLORIDE, PRESERVATIVE FREE 10 ML: 5 INJECTION INTRAVENOUS at 13:08

## 2022-06-07 RX ADMIN — SODIUM CHLORIDE, POTASSIUM CHLORIDE, SODIUM LACTATE AND CALCIUM CHLORIDE 100 ML/HR: 600; 310; 30; 20 INJECTION, SOLUTION INTRAVENOUS at 03:36

## 2022-06-07 RX ADMIN — ONDANSETRON 4 MG: 2 INJECTION INTRAMUSCULAR; INTRAVENOUS at 11:54

## 2022-06-07 NOTE — PROGRESS NOTES
PROGRESS NOTE      Chief Complaints:  Patient examined this morning. HPI and  Objective:    Yesterday she passed some gas but since then she has not had any movement. She denies nausea. CT scan was performed yesterday. Findings discussed as below. Review of Systems:  Rest of review of system negative, personally reviewed. EXAM:  Visit Vitals  BP (!) 144/92   Pulse 74   Temp 97.9 °F (36.6 °C)   Resp 19   Ht 5' 7\" (1.702 m)   Wt 136 lb 11 oz (62 kg)   SpO2 100%   BMI 21.41 kg/m²       Patient is awake and alert  Head and neck atraumatic, normocephalic. ENT: No hoarse voice  Cardiac system regular rate rhythm. Pulmonary no audible wheeze  Chest wall excursion normal with respiration cycle  Abdomen is soft not particularly distended. Neurologically nonfocal.  Skin is warm and moist.  Psychosocial: Cooperative. Vascular examination as previously noted no changes. Recent Results (from the past 24 hour(s))   CBC WITH AUTOMATED DIFF    Collection Time: 06/07/22  8:56 AM   Result Value Ref Range    WBC 5.9 3.6 - 11.0 K/uL    RBC 4.34 3.80 - 5.20 M/uL    HGB 13.8 11.5 - 16.0 g/dL    HCT 42.1 35.0 - 47.0 %    MCV 97.0 80.0 - 99.0 FL    MCH 31.8 26.0 - 34.0 PG    MCHC 32.8 30.0 - 36.5 g/dL    RDW 12.0 11.5 - 14.5 %    PLATELET 385 325 - 823 K/uL    MPV 11.5 8.9 - 12.9 FL    NRBC 0.0 0.0  WBC    ABSOLUTE NRBC 0.00 0.00 - 0.01 K/uL    NEUTROPHILS 67 32 - 75 %    LYMPHOCYTES 21 12 - 49 %    MONOCYTES 11 5 - 13 %    EOSINOPHILS 1 0 - 7 %    BASOPHILS 0 0 - 1 %    IMMATURE GRANULOCYTES 0 0 - 0.5 %    ABS. NEUTROPHILS 3.9 1.8 - 8.0 K/UL    ABS. LYMPHOCYTES 1.2 0.8 - 3.5 K/UL    ABS. MONOCYTES 0.7 0.0 - 1.0 K/UL    ABS. EOSINOPHILS 0.1 0.0 - 0.4 K/UL    ABS. BASOPHILS 0.0 0.0 - 0.1 K/UL    ABS. IMM.  GRANS. 0.0 0.00 - 0.04 K/UL    DF AUTOMATED     METABOLIC PANEL, BASIC    Collection Time: 06/07/22  8:56 AM   Result Value Ref Range    Sodium 138 136 - 145 mmol/L    Potassium 3.6 3.5 - 5.1 mmol/L    Chloride 104 97 - 108 mmol/L    CO2 29 21 - 32 mmol/L    Anion gap 5 5 - 15 mmol/L    Glucose 85 65 - 100 mg/dL    BUN 17 6 - 20 mg/dL    Creatinine 0.75 0.55 - 1.02 mg/dL    BUN/Creatinine ratio 23 (H) 12 - 20      GFR est AA >60 >60 ml/min/1.73m2    GFR est non-AA >60 >60 ml/min/1.73m2    Calcium 10.3 (H) 8.5 - 10.1 mg/dL       ASSESSMENT:   Patient is 45 y.o. with diagnosis of : Active Problems:    [facility] (6/4/2022)        PLAN:                 Abdomen seems very soft and not distended however CT scan yesterday shows persistent small bowel obstruction. If the patient does not show any signs of GI activity most likely I will offer her exploratory laparotomy tomorrow.

## 2022-06-07 NOTE — PROGRESS NOTES
Hospitalist Progress Note            Daily Progress Note: 6/7/2022 2:13 PM  Hospital course:     Carie Hernandez is a 45year-old female with a PMHx significant for ovarian cancer s/p hysterectomy & oophorectomy and hypertension who is presenting to the ED for persistent nausea and vomiting. She was recently seen at [facility] on 06/01 where she initially presented with n/v/abd pain. She was given Bactrim for UTI and PPI. Returned today for obstipation, n/v, abd pain. She does report bowel movement on 06/03. She is being followed by Jeanine Moran/Yovani for Breast Lesion. Cancer resection in 2016 and is in remission now. In the ED, hemodynamically stable. No BRBPR or hematemesis. CBC and CMP unremarkable other than slightly elevated calcium level. CT abd/pelvis showing early/partial SBO. General surgery consult. NG tube placed. Urinalysis from 06/01 positive for leukocyte esterase and trichomonas. Started on IV Flagyl and Levaquin. ID consult. Repeat CT of chest and abdomen without change. Subjective: Follow-up examination of patient at the bedside. She had gas yesterday 1 time. Encouraged to ambulate around the room. Assessment/Plan:   Active Problems:    SBO (small bowel obstruction) ([address] (6/4/2022)      1. Subacute SBO (Hx of Abd Surgery for Ovarian CA)  - NG tube   - NPO  - Surgery following-if no improvement tomorrow will schedule exploratory lap  - IVF  - IV Pepcid  - Sx control  -Repeat CT without change     2. UTI  - UA positive for leukocyte esterase and trichomonas  - IV flagyl and levaquin  - IV fluids  - ID following  - Urine culture pending     3. HTN  - Norvasc 5 mg     4. Ovarian CA in Remission  - Followed by Jeanine Moran/Yovani for Breast Lesion  - Cancer resection in 2016 and is in remission     5.  Anxiety  - Continue Effexor      DVT Prophylaxis: Lovenox  Code Status: Full Code  POA/NOK:    Disposition and discharge barriers:    SBO resolution   UTI, cultures pending  Care Plan discussed with: Patient, family member, staff, IDR team    Current Facility-Administered Medications   Medication Dose Route Frequency    hydrALAZINE (APRESOLINE) 20 mg/mL injection 10 mg  10 mg IntraVENous Q4H PRN    benzocaine-menthoL (CEPACOL) lozenge 1 Lozenge  1 Lozenge Mucous Membrane PRN    metroNIDAZOLE (FLAGYL) IVPB premix 500 mg  500 mg IntraVENous Q12H    levoFLOXacin (LEVAQUIN) 500 mg in D5W IVPB  500 mg IntraVENous Q24H    ketorolac (TORADOL) injection 30 mg  30 mg IntraVENous Q6H PRN    sodium chloride (NS) flush 5-40 mL  5-40 mL IntraVENous Q8H    sodium chloride (NS) flush 5-40 mL  5-40 mL IntraVENous PRN    acetaminophen (TYLENOL) tablet 650 mg  650 mg Oral Q6H PRN    Or    acetaminophen (TYLENOL) suppository 650 mg  650 mg Rectal Q6H PRN    polyethylene glycol (MIRALAX) packet 17 g  17 g Oral DAILY PRN    ondansetron (ZOFRAN ODT) tablet 4 mg  4 mg Oral Q8H PRN    Or    ondansetron (ZOFRAN) injection 4 mg  4 mg IntraVENous Q6H PRN    enoxaparin (LOVENOX) injection 40 mg  40 mg SubCUTAneous DAILY    lactated Ringers infusion  100 mL/hr IntraVENous CONTINUOUS    amLODIPine (NORVASC) tablet 5 mg  5 mg Oral DAILY    gabapentin (NEURONTIN) capsule 100 mg  100 mg Oral DAILY    venlafaxine-SR (EFFEXOR-XR) capsule 37.5 mg  37.5 mg Oral DAILY    famotidine (PF) (PEPCID) 20 mg in 0.9% sodium chloride 10 mL injection  20 mg IntraVENous Q12H    HYDROmorphone (DILAUDID) injection 1 mg  1 mg IntraVENous Q4H PRN        REVIEW OF SYSTEMS    Review of Systems   Constitutional: Positive for weight loss. Respiratory: Negative for cough. Cardiovascular: Negative for chest pain. Gastrointestinal: Positive for abdominal pain. Genitourinary: Positive for dysuria. Musculoskeletal: Positive for myalgias. Psychiatric/Behavioral: The patient is nervous/anxious.          Objective:     Visit Vitals  BP (!) 144/92   Pulse 74   Temp 97.9 °F (36.6 °C)   Resp 19   Ht 5' 7\" (1.702 m)   Wt 62 kg (136 lb 11 oz)   SpO2 100%   BMI 21.41 kg/m²      O2 Device: None (Room air)    Temp (24hrs), Av.8 °F (36.6 °C), Min:97.6 °F (36.4 °C), Max:98 °F (36.7 °C)      No intake/output data recorded.  1901 -  0700  In: -   Out: 800     PHYSICAL EXAM:    Physical Exam  Constitutional:       Appearance: She is ill-appearing. HENT:      Nose: No congestion. Cardiovascular:      Rate and Rhythm: Regular rhythm. Bradycardia present. Pulmonary:      Effort: No respiratory distress. Abdominal:      General: There is distension. Tenderness: There is abdominal tenderness. Musculoskeletal:         General: Normal range of motion. Skin:     General: Skin is warm. Data Review    Recent Results (from the past 24 hour(s))   CBC WITH AUTOMATED DIFF    Collection Time: 22  8:56 AM   Result Value Ref Range    WBC 5.9 3.6 - 11.0 K/uL    RBC 4.34 3.80 - 5.20 M/uL    HGB 13.8 11.5 - 16.0 g/dL    HCT 42.1 35.0 - 47.0 %    MCV 97.0 80.0 - 99.0 FL    MCH 31.8 26.0 - 34.0 PG    MCHC 32.8 30.0 - 36.5 g/dL    RDW 12.0 11.5 - 14.5 %    PLATELET 172 747 - 669 K/uL    MPV 11.5 8.9 - 12.9 FL    NRBC 0.0 0.0  WBC    ABSOLUTE NRBC 0.00 0.00 - 0.01 K/uL    NEUTROPHILS 67 32 - 75 %    LYMPHOCYTES 21 12 - 49 %    MONOCYTES 11 5 - 13 %    EOSINOPHILS 1 0 - 7 %    BASOPHILS 0 0 - 1 %    IMMATURE GRANULOCYTES 0 0 - 0.5 %    ABS. NEUTROPHILS 3.9 1.8 - 8.0 K/UL    ABS. LYMPHOCYTES 1.2 0.8 - 3.5 K/UL    ABS. MONOCYTES 0.7 0.0 - 1.0 K/UL    ABS. EOSINOPHILS 0.1 0.0 - 0.4 K/UL    ABS. BASOPHILS 0.0 0.0 - 0.1 K/UL    ABS. IMM.  GRANS. 0.0 0.00 - 0.04 K/UL    DF AUTOMATED     METABOLIC PANEL, BASIC    Collection Time: 22  8:56 AM   Result Value Ref Range    Sodium 138 136 - 145 mmol/L    Potassium 3.6 3.5 - 5.1 mmol/L    Chloride 104 97 - 108 mmol/L    CO2 29 21 - 32 mmol/L    Anion gap 5 5 - 15 mmol/L    Glucose 85 65 - 100 mg/dL    BUN 17 6 - 20 mg/dL    Creatinine 0.75 0.55 - 1.02 mg/dL BUN/Creatinine ratio 23 (H) 12 - 20      GFR est AA >60 >60 ml/min/1.73m2    GFR est non-AA >60 >60 ml/min/1.73m2    Calcium 10.3 (H) 8.5 - 10.1 mg/dL       CT ABD PELV WO CONT   Final Result   Redemonstration of the distended small bowel loops. .         XR ABD FLAT/ ERECT   Final Result   NG tube position as above. Early/partial small bowel obstruction. CT ABD PELV W CONT   Final Result   Early/partial small bowel obstruction. _____________________________________________________________________________  Time spent in direct care including coordination of service, review of data and examination: > 35 minutes    ______________________________________________________________________________    Robert Fontanez NP    This is dictation was done by dragon, computer voice recognition software. Quite often unanticipated grammatical, syntax, homophones and other interpretive errors or inadvertently transcribed by the computer software. Please excuse errors that have escaped final proofreading. Thank you.

## 2022-06-07 NOTE — ANESTHESIA PREPROCEDURE EVALUATION
Relevant Problems   No relevant active problems       Anesthetic History   No history of anesthetic complications            Review of Systems / Medical History  Patient summary reviewed, nursing notes reviewed and pertinent labs reviewed    Pulmonary          Smoker         Neuro/Psych   Within defined limits           Cardiovascular    Hypertension              Exercise tolerance: >4 METS     GI/Hepatic/Renal                Endo/Other        Cancer (CA OVARIES. )     Other Findings   Comments: SMALL BOWEL OBSTRUCTION. Physical Exam    Airway  Mallampati: I  TM Distance: > 6 cm  Neck ROM: normal range of motion   Mouth opening: Normal     Cardiovascular    Rhythm: regular  Rate: normal         Dental    Dentition: Lower dentition intact and Upper dentition intact     Pulmonary                 Abdominal        Comments: NG IN PLACE. Other Findings            Anesthetic Plan    ASA: 4, emergent  Anesthesia type: general          Induction: RSI  Anesthetic plan and risks discussed with: Patient      Surgery not done per surgeon's decision.

## 2022-06-08 ENCOUNTER — APPOINTMENT (OUTPATIENT)
Dept: GENERAL RADIOLOGY | Age: 52
DRG: 247 | End: 2022-06-08
Attending: HOSPITALIST
Payer: MEDICAID

## 2022-06-08 ENCOUNTER — ANESTHESIA (OUTPATIENT)
Dept: SURGERY | Age: 52
DRG: 247 | End: 2022-06-08
Payer: MEDICAID

## 2022-06-08 LAB
ANION GAP SERPL CALC-SCNC: 9 MMOL/L (ref 5–15)
BASOPHILS # BLD: 0 K/UL (ref 0–0.1)
BASOPHILS NFR BLD: 1 % (ref 0–1)
BUN SERPL-MCNC: 12 MG/DL (ref 6–20)
BUN/CREAT SERPL: 22 (ref 12–20)
CA-I BLD-MCNC: 9.9 MG/DL (ref 8.5–10.1)
CHLORIDE SERPL-SCNC: 105 MMOL/L (ref 97–108)
CO2 SERPL-SCNC: 26 MMOL/L (ref 21–32)
CREAT SERPL-MCNC: 0.54 MG/DL (ref 0.55–1.02)
DIFFERENTIAL METHOD BLD: NORMAL
EOSINOPHIL # BLD: 0.1 K/UL (ref 0–0.4)
EOSINOPHIL NFR BLD: 1 % (ref 0–7)
ERYTHROCYTE [DISTWIDTH] IN BLOOD BY AUTOMATED COUNT: 11.8 % (ref 11.5–14.5)
GLUCOSE SERPL-MCNC: 88 MG/DL (ref 65–100)
HCT VFR BLD AUTO: 38.4 % (ref 35–47)
HGB BLD-MCNC: 13.1 G/DL (ref 11.5–16)
IMM GRANULOCYTES # BLD AUTO: 0 K/UL (ref 0–0.04)
IMM GRANULOCYTES NFR BLD AUTO: 0 % (ref 0–0.5)
LYMPHOCYTES # BLD: 1.2 K/UL (ref 0.8–3.5)
LYMPHOCYTES NFR BLD: 19 % (ref 12–49)
MCH RBC QN AUTO: 32.1 PG (ref 26–34)
MCHC RBC AUTO-ENTMCNC: 34.1 G/DL (ref 30–36.5)
MCV RBC AUTO: 94.1 FL (ref 80–99)
MONOCYTES # BLD: 0.6 K/UL (ref 0–1)
MONOCYTES NFR BLD: 10 % (ref 5–13)
NEUTS SEG # BLD: 4.4 K/UL (ref 1.8–8)
NEUTS SEG NFR BLD: 69 % (ref 32–75)
NRBC # BLD: 0 K/UL (ref 0–0.01)
NRBC BLD-RTO: 0 PER 100 WBC
PLATELET # BLD AUTO: 153 K/UL (ref 150–400)
PMV BLD AUTO: 11.2 FL (ref 8.9–12.9)
POTASSIUM SERPL-SCNC: 3.2 MMOL/L (ref 3.5–5.1)
RBC # BLD AUTO: 4.08 M/UL (ref 3.8–5.2)
SODIUM SERPL-SCNC: 140 MMOL/L (ref 136–145)
WBC # BLD AUTO: 6.3 K/UL (ref 3.6–11)

## 2022-06-08 PROCEDURE — 73502 X-RAY EXAM HIP UNI 2-3 VIEWS: CPT

## 2022-06-08 PROCEDURE — 65270000029 HC RM PRIVATE

## 2022-06-08 PROCEDURE — 74011250637 HC RX REV CODE- 250/637: Performed by: INTERNAL MEDICINE

## 2022-06-08 PROCEDURE — 74011250636 HC RX REV CODE- 250/636: Performed by: INTERNAL MEDICINE

## 2022-06-08 PROCEDURE — 85025 COMPLETE CBC W/AUTO DIFF WBC: CPT

## 2022-06-08 PROCEDURE — 80048 BASIC METABOLIC PNL TOTAL CA: CPT

## 2022-06-08 PROCEDURE — 73100 X-RAY EXAM OF WRIST: CPT

## 2022-06-08 PROCEDURE — 73130 X-RAY EXAM OF HAND: CPT

## 2022-06-08 PROCEDURE — 74011250636 HC RX REV CODE- 250/636: Performed by: NURSE PRACTITIONER

## 2022-06-08 PROCEDURE — 36415 COLL VENOUS BLD VENIPUNCTURE: CPT

## 2022-06-08 PROCEDURE — 74011250636 HC RX REV CODE- 250/636: Performed by: SURGERY

## 2022-06-08 PROCEDURE — 74011000250 HC RX REV CODE- 250: Performed by: INTERNAL MEDICINE

## 2022-06-08 PROCEDURE — 99232 SBSQ HOSP IP/OBS MODERATE 35: CPT | Performed by: INTERNAL MEDICINE

## 2022-06-08 PROCEDURE — 74011250636 HC RX REV CODE- 250/636: Performed by: STUDENT IN AN ORGANIZED HEALTH CARE EDUCATION/TRAINING PROGRAM

## 2022-06-08 PROCEDURE — 99232 SBSQ HOSP IP/OBS MODERATE 35: CPT | Performed by: SURGERY

## 2022-06-08 RX ORDER — POTASSIUM CHLORIDE 7.45 MG/ML
10 INJECTION INTRAVENOUS ONCE
Status: COMPLETED | OUTPATIENT
Start: 2022-06-08 | End: 2022-06-08

## 2022-06-08 RX ORDER — POTASSIUM CHLORIDE 7.45 MG/ML
10 INJECTION INTRAVENOUS
Status: DISPENSED | OUTPATIENT
Start: 2022-06-08 | End: 2022-06-08

## 2022-06-08 RX ADMIN — SODIUM CHLORIDE, PRESERVATIVE FREE 10 ML: 5 INJECTION INTRAVENOUS at 20:59

## 2022-06-08 RX ADMIN — SODIUM CHLORIDE, PRESERVATIVE FREE 20 MG: 5 INJECTION INTRAVENOUS at 10:05

## 2022-06-08 RX ADMIN — POTASSIUM CHLORIDE 10 MEQ: 7.46 INJECTION, SOLUTION INTRAVENOUS at 18:29

## 2022-06-08 RX ADMIN — SODIUM CHLORIDE, POTASSIUM CHLORIDE, SODIUM LACTATE AND CALCIUM CHLORIDE 100 ML/HR: 600; 310; 30; 20 INJECTION, SOLUTION INTRAVENOUS at 07:28

## 2022-06-08 RX ADMIN — SODIUM CHLORIDE, PRESERVATIVE FREE 20 MG: 5 INJECTION INTRAVENOUS at 20:45

## 2022-06-08 RX ADMIN — HYDROMORPHONE HYDROCHLORIDE 1 MG: 1 INJECTION, SOLUTION INTRAMUSCULAR; INTRAVENOUS; SUBCUTANEOUS at 00:53

## 2022-06-08 RX ADMIN — POTASSIUM CHLORIDE 10 MEQ: 7.46 INJECTION, SOLUTION INTRAVENOUS at 10:05

## 2022-06-08 RX ADMIN — HYDROMORPHONE HYDROCHLORIDE 1 MG: 1 INJECTION, SOLUTION INTRAMUSCULAR; INTRAVENOUS; SUBCUTANEOUS at 20:59

## 2022-06-08 RX ADMIN — SODIUM CHLORIDE, PRESERVATIVE FREE 10 ML: 5 INJECTION INTRAVENOUS at 14:00

## 2022-06-08 RX ADMIN — VENLAFAXINE HYDROCHLORIDE 37.5 MG: 37.5 CAPSULE, EXTENDED RELEASE ORAL at 10:02

## 2022-06-08 RX ADMIN — METRONIDAZOLE 500 MG: 500 INJECTION, SOLUTION INTRAVENOUS at 12:33

## 2022-06-08 RX ADMIN — METRONIDAZOLE 500 MG: 500 INJECTION, SOLUTION INTRAVENOUS at 20:45

## 2022-06-08 RX ADMIN — LEVOFLOXACIN 500 MG: 5 INJECTION, SOLUTION INTRAVENOUS at 12:33

## 2022-06-08 RX ADMIN — ENOXAPARIN SODIUM 40 MG: 100 INJECTION SUBCUTANEOUS at 10:05

## 2022-06-08 RX ADMIN — GABAPENTIN 100 MG: 100 CAPSULE ORAL at 10:03

## 2022-06-08 RX ADMIN — AMLODIPINE BESYLATE 5 MG: 5 TABLET ORAL at 10:03

## 2022-06-08 NOTE — PROGRESS NOTES
Infectious Disease Progress Note          Subjective:   Pt seen and examined at bedside. Doing well, denies new complaints, no acute events since last seen   Objective:   Physical Exam:     Visit Vitals  BP (!) 139/90 (BP 1 Location: Left upper arm, BP Patient Position: At rest)   Pulse 61   Temp 97.7 °F (36.5 °C)   Resp 18   Ht 5' 7\" (1.702 m)   Wt 138 lb 10.7 oz (62.9 kg)   SpO2 100%   BMI 21.72 kg/m²      O2 Device: None (Room air)    Temp (24hrs), Av.6 °F (37 °C), Min:97.7 °F (36.5 °C), Max:99.9 °F (37.7 °C)     07 -  1900  In: -   Out: 250    No intake/output data recorded.     General: NAD, AAO x 4  HEENT: MERLYN, Moist mucosa, NGT in place   Lungs: CTA b/l, decreased at the bases   Heart: S1S2+, RRR, no murmur  Abdo: Soft, NT, ND, +BS, no organomegaly   :  No soliman cath   Exts: No edema, + pulses b/l   Skin: No wounds, No rashes or lesions      Data Review:       Recent Days:  Recent Labs     22  0759 22  0856 22  1031   WBC 6.3 5.9 8.8   HGB 13.1 13.8 14.4   HCT 38.4 42.1 42.4    153 135*     Recent Labs     22  0759 22  0856 22  1031   BUN 12 17 15   CREA 0.54* 0.75 0.64       Lab Results   Component Value Date/Time    C-Reactive protein 5.23 (H) 2022 10:31 AM          Microbiology     Results     Procedure Component Value Units Date/Time    CULTURE, URINE [[id number]] Collected: 22 1030    Order Status: Completed Specimen: Urine Updated: 22 1139     Special Requests: --        No Special Requests  Reflexed from [id number]       [place] Count 15,000        [place] Count colonies/ml        Culture result:       Mixed urogenital mukul isolated          INFLUENZA A & B AG (RAPID TEST) [[id number]] Collected: 22 1035    Order Status: Completed Specimen: Nasopharyngeal from Nasal washing Updated: 22 1107     Influenza A Antigen Negative        Influenza B Antigen Negative                Diagnostics   CXR Results (Last 48 hours)    None             Assessment/Plan     1. Trichomonas vaginitis, incidental finding on UA (06//01), asymptomatic             Remains afebrile w a normal WBC on routine labs        On metronidazole, Day # 4/7     2. UTI, abnormal UA, Mixed urogenital mukul isolated from Cx      S/p 3 days of levaquin, denies dysuria or hematuria     3. Early/partial small bowel obstruction, NGT remains in place        Dilated bowel loops on repeat CT abdo/pel (06/06)        Abdomen is soft and non-distended        On clear liquid diet, requesting soft diet, will defer to Dr Dennis      4. H/o ovarian Ca in 2015: S/p abdominal hysterectomy, and b/l oophorectomy.  Ca 125 is normal at 2     Jasper Delaney MD    6/8/2022

## 2022-06-08 NOTE — PROGRESS NOTES
Problem: Falls - Risk of  Goal: *Absence of Falls  Description: Document Jeanine Jernigan Fall Risk and appropriate interventions in the flowsheet.   Outcome: Progressing Towards Goal  Note: Fall Risk Interventions:            Medication Interventions: Teach patient to arise slowly    Elimination Interventions: Call light in reach,Patient to call for help with toileting needs,Toilet paper/wipes in reach,Toileting schedule/hourly rounds              Problem: Patient Education: Go to Patient Education Activity  Goal: Patient/Family Education  Outcome: Progressing Towards Goal     Problem: General Medical Care Plan  Goal: *Vital signs within specified parameters  Outcome: Progressing Towards Goal

## 2022-06-08 NOTE — PROGRESS NOTES
Report given to [facility] oncoming nurse to include sbar, mar, kardex, recent orders and changes;

## 2022-06-08 NOTE — PROGRESS NOTES
PROGRESS NOTE      Chief Complaints:  Patient states slightly better. HPI and  Objective:  Patient is yet to have any passing gas or bowel movement. But she feels it slightly better. No fever chills chest pain shortness  Review of Systems: Rest of review of system negative compression reviewed  EXAM:  Visit Vitals  BP (!) 139/90 (BP 1 Location: Left upper arm, BP Patient Position: At rest)   Pulse 61   Temp 97.7 °F (36.5 °C)   Resp 18   Ht 5' 7\" (1.702 m)   Wt 138 lb 10.7 oz (62.9 kg)   SpO2 100%   BMI 21.72 kg/m²       Patient is awake and alert  Head and neck atraumatic, normocephalic. ENT: No hoarse voice  Cardiac system regular rate rhythm. Pulmonary no audible wheeze  Chest wall excursion normal with respiration cycle  Abdomen is soft not particularly distended. Neurologically nonfocal.  Skin is warm and moist.  Psychosocial: Cooperative. Vascular examination as previously noted no changes. Recent Results (from the past 24 hour(s))   CBC WITH AUTOMATED DIFF    Collection Time: 06/08/22  7:59 AM   Result Value Ref Range    WBC 6.3 3.6 - 11.0 K/uL    RBC 4.08 3.80 - 5.20 M/uL    HGB 13.1 11.5 - 16.0 g/dL    HCT 38.4 35.0 - 47.0 %    MCV 94.1 80.0 - 99.0 FL    MCH 32.1 26.0 - 34.0 PG    MCHC 34.1 30.0 - 36.5 g/dL    RDW 11.8 11.5 - 14.5 %    PLATELET 360 025 - 278 K/uL    MPV 11.2 8.9 - 12.9 FL    NRBC 0.0 0.0  WBC    ABSOLUTE NRBC 0.00 0.00 - 0.01 K/uL    NEUTROPHILS 69 32 - 75 %    LYMPHOCYTES 19 12 - 49 %    MONOCYTES 10 5 - 13 %    EOSINOPHILS 1 0 - 7 %    BASOPHILS 1 0 - 1 %    IMMATURE GRANULOCYTES 0 0 - 0.5 %    ABS. NEUTROPHILS 4.4 1.8 - 8.0 K/UL    ABS. LYMPHOCYTES 1.2 0.8 - 3.5 K/UL    ABS. MONOCYTES 0.6 0.0 - 1.0 K/UL    ABS. EOSINOPHILS 0.1 0.0 - 0.4 K/UL    ABS. BASOPHILS 0.0 0.0 - 0.1 K/UL    ABS. IMM.  GRANS. 0.0 0.00 - 0.04 K/UL    DF AUTOMATED     METABOLIC PANEL, BASIC    Collection Time: 06/08/22  7:59 AM   Result Value Ref Range    Sodium 140 136 - 145 mmol/L    Potassium 3.2 (L) 3.5 - 5.1 mmol/L    Chloride 105 97 - 108 mmol/L    CO2 26 21 - 32 mmol/L    Anion gap 9 5 - 15 mmol/L    Glucose 88 65 - 100 mg/dL    BUN 12 6 - 20 mg/dL    Creatinine 0.54 (L) 0.55 - 1.02 mg/dL    BUN/Creatinine ratio 22 (H) 12 - 20      GFR est AA >60 >60 ml/min/1.73m2    GFR est non-AA >60 >60 ml/min/1.73m2    Calcium 9.9 8.5 - 10.1 mg/dL       ASSESSMENT:   Patient is 45 y.o. with diagnosis of : Active Problems:    [facility] (6/4/2022)        PLAN:                 Patient decided not to have surgery today. She said that she feels slightly better. She is not having any bowel movement or passing gas. She states that she wants to wait another day or so. If no improvement then she will decide to have surgery. We will continue monitor her progress. Labs are pending this morning.

## 2022-06-08 NOTE — PROGRESS NOTES
Hospitalist Progress Note            Daily Progress Note: 6/8/2022 2:13 PM  Hospital course:     Carie Hernandez is a 45year-old female with a PMHx significant for ovarian cancer s/p hysterectomy & oophorectomy and hypertension who is presenting to the ED for persistent nausea and vomiting. She was recently seen at [facility] on 06/01 where she initially presented with n/v/abd pain. She was given Bactrim for UTI and PPI. Returned today for obstipation, n/v, abd pain. She does report bowel movement on 06/03. She is being followed by Dolly Moran/Yovani for Breast Lesion. Cancer resection in 2016 and is in remission now. In the ED, hemodynamically stable. No BRBPR or hematemesis. CBC and CMP unremarkable other than slightly elevated calcium level. CT abd/pelvis showing early/partial SBO. General surgery consult. NG tube placed. Urinalysis from 06/01 positive for leukocyte esterase and trichomonas. Started on IV Flagyl and Levaquin. ID consult. Repeat CT of chest and abdomen without change. Subjective: Follow-up examination of patient at the bedside. Patient would like more time to recover from SBO prior to surgical intervention. Assessment/Plan:   Active Problems:    SBO (small bowel obstruction) ([address] (6/4/2022)      1. Subacute SBO (Hx of Abd Surgery for Ovarian CA)  - NG tube   - NPO  - Surgery following-if no improvement in the next day or so will schedule exploratory lap  - IVF  - IV Pepcid  - Sx control  -Repeat CT without change     2. UTI  - UA positive for leukocyte esterase and trichomonas  - IV flagyl and levaquin  - IV fluids  - ID following  - Urine culture pending     3. HTN  - Norvasc 5 mg     4. Ovarian CA in Remission  - Followed by Dolly Moran/Yovani for Breast Lesion  - Cancer resection in 2016 and is in remission     5.  Anxiety  - Continue Effexor      DVT Prophylaxis: Lovenox  Code Status: Full Code  POA/NOK:    Disposition and discharge barriers:    SBO resolution   UTI, cultures pending  Care Plan discussed with: Patient, family member, staff, IDR team    Current Facility-Administered Medications   Medication Dose Route Frequency    potassium chloride 10 mEq in 100 ml IVPB  10 mEq IntraVENous Q1H    hydrALAZINE (APRESOLINE) 20 mg/mL injection 10 mg  10 mg IntraVENous Q4H PRN    benzocaine-menthoL (CEPACOL) lozenge 1 Lozenge  1 Lozenge Mucous Membrane PRN    metroNIDAZOLE (FLAGYL) IVPB premix 500 mg  500 mg IntraVENous Q12H    levoFLOXacin (LEVAQUIN) 500 mg in D5W IVPB  500 mg IntraVENous Q24H    ketorolac (TORADOL) injection 30 mg  30 mg IntraVENous Q6H PRN    sodium chloride (NS) flush 5-40 mL  5-40 mL IntraVENous Q8H    sodium chloride (NS) flush 5-40 mL  5-40 mL IntraVENous PRN    acetaminophen (TYLENOL) tablet 650 mg  650 mg Oral Q6H PRN    Or    acetaminophen (TYLENOL) suppository 650 mg  650 mg Rectal Q6H PRN    polyethylene glycol (MIRALAX) packet 17 g  17 g Oral DAILY PRN    ondansetron (ZOFRAN ODT) tablet 4 mg  4 mg Oral Q8H PRN    Or    ondansetron (ZOFRAN) injection 4 mg  4 mg IntraVENous Q6H PRN    enoxaparin (LOVENOX) injection 40 mg  40 mg SubCUTAneous DAILY    lactated Ringers infusion  100 mL/hr IntraVENous CONTINUOUS    amLODIPine (NORVASC) tablet 5 mg  5 mg Oral DAILY    gabapentin (NEURONTIN) capsule 100 mg  100 mg Oral DAILY    venlafaxine-SR (EFFEXOR-XR) capsule 37.5 mg  37.5 mg Oral DAILY    famotidine (PF) (PEPCID) 20 mg in 0.9% sodium chloride 10 mL injection  20 mg IntraVENous Q12H    HYDROmorphone (DILAUDID) injection 1 mg  1 mg IntraVENous Q4H PRN        REVIEW OF SYSTEMS    Review of Systems   Constitutional: Positive for weight loss. Respiratory: Negative for cough. Cardiovascular: Negative for chest pain. Gastrointestinal: Positive for abdominal pain. Genitourinary: Positive for dysuria. Musculoskeletal: Positive for myalgias. Psychiatric/Behavioral: The patient is nervous/anxious.          Objective: Visit Vitals  BP (!) 139/90 (BP 1 Location: Left upper arm, BP Patient Position: At rest)   Pulse 61   Temp 97.7 °F (36.5 °C)   Resp 18   Ht 5' 7\" (1.702 m)   Wt 62.9 kg (138 lb 10.7 oz)   SpO2 100%   BMI 21.72 kg/m²      O2 Device: None (Room air)    Temp (24hrs), Av.6 °F (37 °C), Min:97.7 °F (36.5 °C), Max:99.9 °F (37.7 °C)       07 -  1900  In: -   Out: 250   No intake/output data recorded. PHYSICAL EXAM:    Physical Exam  Constitutional:       Appearance: She is ill-appearing. HENT:      Nose: No congestion. Cardiovascular:      Rate and Rhythm: Regular rhythm. Bradycardia present. Pulmonary:      Effort: No respiratory distress. Abdominal:      General: There is distension. Tenderness: There is abdominal tenderness. Musculoskeletal:         General: Normal range of motion. Skin:     General: Skin is warm. Data Review    Recent Results (from the past 24 hour(s))   CBC WITH AUTOMATED DIFF    Collection Time: 22  7:59 AM   Result Value Ref Range    WBC 6.3 3.6 - 11.0 K/uL    RBC 4.08 3.80 - 5.20 M/uL    HGB 13.1 11.5 - 16.0 g/dL    HCT 38.4 35.0 - 47.0 %    MCV 94.1 80.0 - 99.0 FL    MCH 32.1 26.0 - 34.0 PG    MCHC 34.1 30.0 - 36.5 g/dL    RDW 11.8 11.5 - 14.5 %    PLATELET 455 802 - 029 K/uL    MPV 11.2 8.9 - 12.9 FL    NRBC 0.0 0.0  WBC    ABSOLUTE NRBC 0.00 0.00 - 0.01 K/uL    NEUTROPHILS 69 32 - 75 %    LYMPHOCYTES 19 12 - 49 %    MONOCYTES 10 5 - 13 %    EOSINOPHILS 1 0 - 7 %    BASOPHILS 1 0 - 1 %    IMMATURE GRANULOCYTES 0 0 - 0.5 %    ABS. NEUTROPHILS 4.4 1.8 - 8.0 K/UL    ABS. LYMPHOCYTES 1.2 0.8 - 3.5 K/UL    ABS. MONOCYTES 0.6 0.0 - 1.0 K/UL    ABS. EOSINOPHILS 0.1 0.0 - 0.4 K/UL    ABS. BASOPHILS 0.0 0.0 - 0.1 K/UL    ABS. IMM.  GRANS. 0.0 0.00 - 0.04 K/UL    DF AUTOMATED     METABOLIC PANEL, BASIC    Collection Time: 22  7:59 AM   Result Value Ref Range    Sodium 140 136 - 145 mmol/L    Potassium 3.2 (L) 3.5 - 5.1 mmol/L    Chloride 105 97 - 108 mmol/L    CO2 26 21 - 32 mmol/L    Anion gap 9 5 - 15 mmol/L    Glucose 88 65 - 100 mg/dL    BUN 12 6 - 20 mg/dL    Creatinine 0.54 (L) 0.55 - 1.02 mg/dL    BUN/Creatinine ratio 22 (H) 12 - 20      GFR est AA >60 >60 ml/min/1.73m2    GFR est non-AA >60 >60 ml/min/1.73m2    Calcium 9.9 8.5 - 10.1 mg/dL       CT ABD PELV WO CONT   Final Result   Redemonstration of the distended small bowel loops. .         XR ABD FLAT/ ERECT   Final Result   NG tube position as above. Early/partial small bowel obstruction. CT ABD PELV W CONT   Final Result   Early/partial small bowel obstruction. _____________________________________________________________________________  Time spent in direct care including coordination of service, review of data and examination: > 35 minutes    ______________________________________________________________________________    Raheem Ramírez NP    This is dictation was done by dragon, computer voice recognition software. Quite often unanticipated grammatical, syntax, homophones and other interpretive errors or inadvertently transcribed by the computer software. Please excuse errors that have escaped final proofreading. Thank you.

## 2022-06-09 LAB
ANION GAP SERPL CALC-SCNC: 8 MMOL/L (ref 5–15)
BASOPHILS # BLD: 0 K/UL (ref 0–0.1)
BASOPHILS NFR BLD: 0 % (ref 0–1)
BUN SERPL-MCNC: 8 MG/DL (ref 6–20)
BUN/CREAT SERPL: 17 (ref 12–20)
CA-I BLD-MCNC: 10 MG/DL (ref 8.5–10.1)
CHLORIDE SERPL-SCNC: 104 MMOL/L (ref 97–108)
CO2 SERPL-SCNC: 26 MMOL/L (ref 21–32)
CREAT SERPL-MCNC: 0.48 MG/DL (ref 0.55–1.02)
DIFFERENTIAL METHOD BLD: ABNORMAL
EOSINOPHIL # BLD: 0.1 K/UL (ref 0–0.4)
EOSINOPHIL NFR BLD: 2 % (ref 0–7)
ERYTHROCYTE [DISTWIDTH] IN BLOOD BY AUTOMATED COUNT: 11.4 % (ref 11.5–14.5)
GLUCOSE SERPL-MCNC: 83 MG/DL (ref 65–100)
HCT VFR BLD AUTO: 39.5 % (ref 35–47)
HGB BLD-MCNC: 13.5 G/DL (ref 11.5–16)
IMM GRANULOCYTES # BLD AUTO: 0 K/UL (ref 0–0.04)
IMM GRANULOCYTES NFR BLD AUTO: 0 % (ref 0–0.5)
LYMPHOCYTES # BLD: 1.3 K/UL (ref 0.8–3.5)
LYMPHOCYTES NFR BLD: 25 % (ref 12–49)
MCH RBC QN AUTO: 31.9 PG (ref 26–34)
MCHC RBC AUTO-ENTMCNC: 34.2 G/DL (ref 30–36.5)
MCV RBC AUTO: 93.4 FL (ref 80–99)
MONOCYTES # BLD: 0.5 K/UL (ref 0–1)
MONOCYTES NFR BLD: 10 % (ref 5–13)
NEUTS SEG # BLD: 3.3 K/UL (ref 1.8–8)
NEUTS SEG NFR BLD: 63 % (ref 32–75)
NRBC # BLD: 0 K/UL (ref 0–0.01)
NRBC BLD-RTO: 0 PER 100 WBC
PLATELET # BLD AUTO: 163 K/UL (ref 150–400)
PMV BLD AUTO: 11.1 FL (ref 8.9–12.9)
POTASSIUM SERPL-SCNC: 3.5 MMOL/L (ref 3.5–5.1)
RBC # BLD AUTO: 4.23 M/UL (ref 3.8–5.2)
SODIUM SERPL-SCNC: 138 MMOL/L (ref 136–145)
WBC # BLD AUTO: 5.2 K/UL (ref 3.6–11)

## 2022-06-09 PROCEDURE — 74011250636 HC RX REV CODE- 250/636: Performed by: INTERNAL MEDICINE

## 2022-06-09 PROCEDURE — 85025 COMPLETE CBC W/AUTO DIFF WBC: CPT

## 2022-06-09 PROCEDURE — 99232 SBSQ HOSP IP/OBS MODERATE 35: CPT | Performed by: INTERNAL MEDICINE

## 2022-06-09 PROCEDURE — 99232 SBSQ HOSP IP/OBS MODERATE 35: CPT | Performed by: SURGERY

## 2022-06-09 PROCEDURE — 65270000029 HC RM PRIVATE

## 2022-06-09 PROCEDURE — 74011250636 HC RX REV CODE- 250/636: Performed by: SURGERY

## 2022-06-09 PROCEDURE — 36415 COLL VENOUS BLD VENIPUNCTURE: CPT

## 2022-06-09 PROCEDURE — 80048 BASIC METABOLIC PNL TOTAL CA: CPT

## 2022-06-09 PROCEDURE — 74011250636 HC RX REV CODE- 250/636: Performed by: STUDENT IN AN ORGANIZED HEALTH CARE EDUCATION/TRAINING PROGRAM

## 2022-06-09 PROCEDURE — 74011000250 HC RX REV CODE- 250: Performed by: INTERNAL MEDICINE

## 2022-06-09 PROCEDURE — 74011250636 HC RX REV CODE- 250/636: Performed by: NURSE PRACTITIONER

## 2022-06-09 RX ORDER — METOCLOPRAMIDE HYDROCHLORIDE 5 MG/ML
10 INJECTION INTRAMUSCULAR; INTRAVENOUS EVERY 6 HOURS
Status: DISCONTINUED | OUTPATIENT
Start: 2022-06-09 | End: 2022-06-12 | Stop reason: HOSPADM

## 2022-06-09 RX ADMIN — METOCLOPRAMIDE HYDROCHLORIDE 10 MG: 5 INJECTION INTRAMUSCULAR; INTRAVENOUS at 17:25

## 2022-06-09 RX ADMIN — METRONIDAZOLE 500 MG: 500 INJECTION, SOLUTION INTRAVENOUS at 21:44

## 2022-06-09 RX ADMIN — METOCLOPRAMIDE HYDROCHLORIDE 10 MG: 5 INJECTION INTRAMUSCULAR; INTRAVENOUS at 12:31

## 2022-06-09 RX ADMIN — SODIUM CHLORIDE, POTASSIUM CHLORIDE, SODIUM LACTATE AND CALCIUM CHLORIDE 100 ML/HR: 600; 310; 30; 20 INJECTION, SOLUTION INTRAVENOUS at 01:21

## 2022-06-09 RX ADMIN — SODIUM CHLORIDE, PRESERVATIVE FREE 20 MG: 5 INJECTION INTRAVENOUS at 09:05

## 2022-06-09 RX ADMIN — SODIUM CHLORIDE, POTASSIUM CHLORIDE, SODIUM LACTATE AND CALCIUM CHLORIDE 100 ML/HR: 600; 310; 30; 20 INJECTION, SOLUTION INTRAVENOUS at 21:43

## 2022-06-09 RX ADMIN — SODIUM CHLORIDE, PRESERVATIVE FREE 10 ML: 5 INJECTION INTRAVENOUS at 16:42

## 2022-06-09 RX ADMIN — ONDANSETRON 4 MG: 2 INJECTION INTRAMUSCULAR; INTRAVENOUS at 16:36

## 2022-06-09 RX ADMIN — HYDRALAZINE HYDROCHLORIDE 10 MG: 20 INJECTION, SOLUTION INTRAMUSCULAR; INTRAVENOUS at 16:37

## 2022-06-09 RX ADMIN — SODIUM CHLORIDE, PRESERVATIVE FREE 20 MG: 5 INJECTION INTRAVENOUS at 21:46

## 2022-06-09 RX ADMIN — HYDROMORPHONE HYDROCHLORIDE 1 MG: 1 INJECTION, SOLUTION INTRAMUSCULAR; INTRAVENOUS; SUBCUTANEOUS at 21:46

## 2022-06-09 RX ADMIN — ENOXAPARIN SODIUM 40 MG: 100 INJECTION SUBCUTANEOUS at 09:05

## 2022-06-09 RX ADMIN — HYDRALAZINE HYDROCHLORIDE 10 MG: 20 INJECTION, SOLUTION INTRAMUSCULAR; INTRAVENOUS at 21:44

## 2022-06-09 RX ADMIN — SODIUM CHLORIDE, PRESERVATIVE FREE 10 ML: 5 INJECTION INTRAVENOUS at 06:01

## 2022-06-09 RX ADMIN — SODIUM CHLORIDE, PRESERVATIVE FREE 10 ML: 5 INJECTION INTRAVENOUS at 21:47

## 2022-06-09 RX ADMIN — METRONIDAZOLE 500 MG: 500 INJECTION, SOLUTION INTRAVENOUS at 09:06

## 2022-06-09 RX ADMIN — METOCLOPRAMIDE HYDROCHLORIDE 10 MG: 5 INJECTION INTRAMUSCULAR; INTRAVENOUS at 09:05

## 2022-06-09 RX ADMIN — HYDROMORPHONE HYDROCHLORIDE 1 MG: 1 INJECTION, SOLUTION INTRAMUSCULAR; INTRAVENOUS; SUBCUTANEOUS at 09:25

## 2022-06-09 RX ADMIN — HYDROMORPHONE HYDROCHLORIDE 1 MG: 1 INJECTION, SOLUTION INTRAMUSCULAR; INTRAVENOUS; SUBCUTANEOUS at 16:37

## 2022-06-09 RX ADMIN — HYDRALAZINE HYDROCHLORIDE 10 MG: 20 INJECTION, SOLUTION INTRAMUSCULAR; INTRAVENOUS at 09:28

## 2022-06-09 NOTE — PROGRESS NOTES
Comprehensive Nutrition Assessment    Type and Reason for Visit: Initial,NPO/clear liquid (NPO x5)    Nutrition Recommendations/Plan:   1. NPO, advance as rec'd by GI/surgeon to PO diet. 2. Initiate PPN D5, AA 4.25 @ 42 mL/hr, advance as appropriate to goal rate of 60 mL/hr-- may start 6/10 or 6/11. Provide 489 kcal(31%), 61 gm PRO(97%). 3. Lipids 250 ml x3/week [500 kcal(63%)]. 4. Continue prokinetic. Malnutrition Assessment:  Malnutrition Status:  Mild malnutrition (06/09/22 1348)    Context:  Acute illness     Findings of the 6 clinical characteristics of malnutrition:   Energy Intake:  50% or less of est energy requirements for 5 or more days (NPO x5 days 2/2 SBO)  Weight Loss:  No significant weight loss     Body Fat Loss:  No significant body fat loss,     Muscle Mass Loss:  No significant muscle mass loss,    Fluid Accumulation:  No significant fluid accumulation,     Strength:  Not performed     Nutrition Assessment:    Admitted for +N/V, SBO. CT scan findings of trace ascites, partial SBO. Pt reported as delaying/refusing sx at this time. NPO x5 days, NGT in place for OP. Pt verbalized hunger and flatulence today. Consider repeat KUB/CT scan. Communicated w/ NP-  PPN to be start if remain NPO additional 2-3 days(nourished); RD to provide regimen. Labs: K 3.2, Cr 0.54. Meds: LCR @ 100 mL/hr, pepcid, norvasc, reglan. Nutrition Related Findings:    NFPE deferred- appears nourished. No N/V/D/abd pain per Pt. +C likely r/t NPO x6 days. Last BM PTA(6/3). No edema. NGT w/ 250 mL OP. Wound Type: None    Current Nutrition Intake & Therapies:  Average Meal Intake: NPO  Average Supplement Intake: NPO  DIET NPO Ice Chips, Sips of Water with Meds    Anthropometric Measures:  Height: 5' 7\" (170.2 cm)  Ideal Body Weight (IBW): 135 lbs (61 kg)  Current Body Wt:  62.9 kg (138 lb 10.7 oz) (6/7), 102.7 % IBW.  Bed scale  Current BMI (kg/m2): 21.7  Usual Body Weight: 61.2 kg (135 lb) (3 months ago)  % Weight Change (Calculated): 2.7  Weight Adjustment: No adjustment   BMI Category: Normal weight (BMI 18.5-24. 9)    Estimated Daily Nutrient Needs:  Energy Requirements Based On: Kcal/kg  Weight Used for Energy Requirements: Current  Energy (kcal/day): 1575 kcal/d  Weight Used for Protein Requirements: Current  Protein (g/day): 63 gm/d  Method Used for Fluid Requirements: 1 ml/kcal  Fluid (ml/day): 1600 mL/d    Nutrition Diagnosis:   · Inadequate oral intake related to altered GI function as evidenced by NPO or clear liquid status due to medical condition,GI abnormality,constipation    Nutrition Interventions:   Food and/or Nutrient Delivery: Continue NPO,Start parenteral nutrition  Nutrition Education/Counseling: Education not indicated  Coordination of Nutrition Care: Continue to monitor while inpatient  Plan of Care discussed with: Pt, NP Torie Dugan    Goals:  Goals: Initiate nutrition support,Initiate PO diet,by next RD assessment    Nutrition Monitoring and Evaluation:   Behavioral-Environmental Outcomes: None identified  Food/Nutrient Intake Outcomes: Parenteral nutrition intake/tolerance,Diet advancement/tolerance  Physical Signs/Symptoms Outcomes: Biochemical data,Weight,GI status,Constipation    Discharge Planning: Too soon to determine    [place] Course, RD  Contact: ext. [phone] or [employer].

## 2022-06-09 NOTE — PROGRESS NOTES
Hospitalist Progress Note            Daily Progress Note: 6/9/2022 2:13 PM  Hospital course:     Carie Hernandez is a 45year-old female with a PMHx significant for ovarian cancer s/p hysterectomy & oophorectomy and hypertension who is presenting to the ED for persistent nausea and vomiting. She was recently seen at [facility] on 06/01 where she initially presented with n/v/abd pain. She was given Bactrim for UTI and PPI. Returned today for obstipation, n/v, abd pain. She does report bowel movement on 06/03. She is being followed by Angelo Moran/Yovani for Breast Lesion. Cancer resection in 2016 and is in remission now. In the ED, hemodynamically stable. No BRBPR or hematemesis. CBC and CMP unremarkable other than slightly elevated calcium level. CT abd/pelvis showing early/partial SBO. General surgery consult. NG tube placed. Urinalysis from 06/01 positive for leukocyte esterase and trichomonas. Started on IV Flagyl and Levaquin. ID consult. Repeat CT of chest and abdomen without change. Subjective: Follow-up examination of patient at the bedside. Still no gas or bowel movement. Patient would like more time to recover from SBO prior to surgical intervention. Assessment/Plan:   Active Problems:    SBO (small bowel obstruction) ([address] (6/4/2022)      1. Subacute SBO (Hx of Abd Surgery for Ovarian CA)  - NG tube   - NPO  - Surgery following-if no improvement in the next day or so will schedule exploratory lap  - IVF  - IV Pepcid  - Sx control  -Repeat CT without change     2. UTI  - UA positive for leukocyte esterase and trichomonas  - IV flagyl and levaquin  - IV fluids  - ID following  - Urine culture pending     3. HTN  - Norvasc 5 mg     4. Ovarian CA in Remission  - Followed by Angelo Moarn/Yovani for Breast Lesion  - Cancer resection in 2016 and is in remission     5.  Anxiety  - Continue Effexor      DVT Prophylaxis: Lovenox  Code Status: Full Code  POA/NOK:    Disposition and discharge barriers:    SBO resolution   UTI, cultures pending  Care Plan discussed with: Patient, family member, staff, IDR team    Current Facility-Administered Medications   Medication Dose Route Frequency    metoclopramide HCl (REGLAN) injection 10 mg  10 mg IntraVENous Q6H    hydrALAZINE (APRESOLINE) 20 mg/mL injection 10 mg  10 mg IntraVENous Q4H PRN    benzocaine-menthoL (CEPACOL) lozenge 1 Lozenge  1 Lozenge Mucous Membrane PRN    metroNIDAZOLE (FLAGYL) IVPB premix 500 mg  500 mg IntraVENous Q12H    ketorolac (TORADOL) injection 30 mg  30 mg IntraVENous Q6H PRN    sodium chloride (NS) flush 5-40 mL  5-40 mL IntraVENous Q8H    sodium chloride (NS) flush 5-40 mL  5-40 mL IntraVENous PRN    acetaminophen (TYLENOL) tablet 650 mg  650 mg Oral Q6H PRN    Or    acetaminophen (TYLENOL) suppository 650 mg  650 mg Rectal Q6H PRN    polyethylene glycol (MIRALAX) packet 17 g  17 g Oral DAILY PRN    ondansetron (ZOFRAN ODT) tablet 4 mg  4 mg Oral Q8H PRN    Or    ondansetron (ZOFRAN) injection 4 mg  4 mg IntraVENous Q6H PRN    enoxaparin (LOVENOX) injection 40 mg  40 mg SubCUTAneous DAILY    lactated Ringers infusion  100 mL/hr IntraVENous CONTINUOUS    amLODIPine (NORVASC) tablet 5 mg  5 mg Oral DAILY    gabapentin (NEURONTIN) capsule 100 mg  100 mg Oral DAILY    venlafaxine-SR (EFFEXOR-XR) capsule 37.5 mg  37.5 mg Oral DAILY    famotidine (PF) (PEPCID) 20 mg in 0.9% sodium chloride 10 mL injection  20 mg IntraVENous Q12H    HYDROmorphone (DILAUDID) injection 1 mg  1 mg IntraVENous Q4H PRN        REVIEW OF SYSTEMS    Review of Systems   Constitutional: Positive for weight loss. Respiratory: Negative for cough. Cardiovascular: Negative for chest pain. Gastrointestinal: Positive for abdominal pain. Genitourinary: Positive for dysuria. Musculoskeletal: Positive for myalgias. Psychiatric/Behavioral: The patient is nervous/anxious.          Objective:     Visit Vitals  BP (!) 177/93   Pulse (!) 57 Temp 98.3 °F (36.8 °C)   Resp 20   Ht 5' 7\" (1.702 m)   Wt 62.9 kg (138 lb 10.7 oz)   SpO2 100%   BMI 21.72 kg/m²      O2 Device: None (Room air)    Temp (24hrs), Av.1 °F (36.7 °C), Min:98 °F (36.7 °C), Max:98.3 °F (36.8 °C)      No intake/output data recorded.  1901 -  0700  In: -   Out: 250     PHYSICAL EXAM:    Physical Exam  Constitutional:       Appearance: She is ill-appearing. HENT:      Nose: No congestion. Cardiovascular:      Rate and Rhythm: Regular rhythm. Bradycardia present. Pulmonary:      Effort: No respiratory distress. Abdominal:      General: There is distension. Tenderness: There is abdominal tenderness. Musculoskeletal:         General: Normal range of motion. Skin:     General: Skin is warm. Data Review    Recent Results (from the past 24 hour(s))   CBC WITH AUTOMATED DIFF    Collection Time: 22 10:09 AM   Result Value Ref Range    WBC 5.2 3.6 - 11.0 K/uL    RBC 4.23 3.80 - 5.20 M/uL    HGB 13.5 11.5 - 16.0 g/dL    HCT 39.5 35.0 - 47.0 %    MCV 93.4 80.0 - 99.0 FL    MCH 31.9 26.0 - 34.0 PG    MCHC 34.2 30.0 - 36.5 g/dL    RDW 11.4 (L) 11.5 - 14.5 %    PLATELET 065 723 - 961 K/uL    MPV 11.1 8.9 - 12.9 FL    NRBC 0.0 0.0  WBC    ABSOLUTE NRBC 0.00 0.00 - 0.01 K/uL    NEUTROPHILS 63 32 - 75 %    LYMPHOCYTES 25 12 - 49 %    MONOCYTES 10 5 - 13 %    EOSINOPHILS 2 0 - 7 %    BASOPHILS 0 0 - 1 %    IMMATURE GRANULOCYTES 0 0 - 0.5 %    ABS. NEUTROPHILS 3.3 1.8 - 8.0 K/UL    ABS. LYMPHOCYTES 1.3 0.8 - 3.5 K/UL    ABS. MONOCYTES 0.5 0.0 - 1.0 K/UL    ABS. EOSINOPHILS 0.1 0.0 - 0.4 K/UL    ABS. BASOPHILS 0.0 0.0 - 0.1 K/UL    ABS. IMM.  GRANS. 0.0 0.00 - 0.04 K/UL    DF AUTOMATED     METABOLIC PANEL, BASIC    Collection Time: 22 10:09 AM   Result Value Ref Range    Sodium 138 136 - 145 mmol/L    Potassium 3.5 3.5 - 5.1 mmol/L    Chloride 104 97 - 108 mmol/L    CO2 26 21 - 32 mmol/L    Anion gap 8 5 - 15 mmol/L    Glucose 83 65 - 100 mg/dL BUN 8 6 - 20 mg/dL    Creatinine 0.48 (L) 0.55 - 1.02 mg/dL    BUN/Creatinine ratio 17 12 - 20      GFR est AA >60 >60 ml/min/1.73m2    GFR est non-AA >60 >60 ml/min/1.73m2    Calcium 10.0 8.5 - 10.1 mg/dL       XR WRIST RT AP/LAT   Final Result      XR HIP RT W OR WO PELV 2-3 VWS   Final Result   No fracture or destructive lesion is seen. The joint spaces are   maintained, as are the adjacent soft tissues. XR HAND RT MIN 3 V   Final Result   No fracture or destructive lesion is seen. The joint spaces are   maintained, as are the adjacent soft tissues. CT ABD PELV WO CONT   Final Result   Redemonstration of the distended small bowel loops. .         XR ABD FLAT/ ERECT   Final Result   NG tube position as above. Early/partial small bowel obstruction. CT ABD PELV W CONT   Final Result   Early/partial small bowel obstruction. _____________________________________________________________________________  Time spent in direct care including coordination of service, review of data and examination: > 35 minutes    ______________________________________________________________________________    Carito Whitt NP    This is dictation was done by dragon, computer voice recognition software. Quite often unanticipated grammatical, syntax, homophones and other interpretive errors or inadvertently transcribed by the computer software. Please excuse errors that have escaped final proofreading. Thank you.

## 2022-06-09 NOTE — PROGRESS NOTES
Problem: Falls - Risk of  Goal: *Absence of Falls  Description: Document Darlene Vance Fall Risk and appropriate interventions in the flowsheet.   Outcome: Progressing Towards Goal  Note: Fall Risk Interventions:            Medication Interventions: Patient to call before getting OOB,Teach patient to arise slowly    Elimination Interventions: Call light in reach,Patient to call for help with toileting needs              Problem: Patient Education: Go to Patient Education Activity  Goal: Patient/Family Education  Outcome: Progressing Towards Goal     Problem: General Medical Care Plan  Goal: *Vital signs within specified parameters  Outcome: Progressing Towards Goal

## 2022-06-09 NOTE — PROGRESS NOTES
PROGRESS NOTE      Chief Complaints:  Examined this morning  HPI and  Objective:  . No bowel activity GI. Patient is not passing any gas. Patient is thinking about transferring care from the hospital.  Review of Systems:  Rest of review of system negative  EXAM:  Visit Vitals  BP (!) 140/90   Pulse 80   Temp 98 °F (36.7 °C)   Resp 20   Ht 5' 7\" (1.702 m)   Wt 138 lb 10.7 oz (62.9 kg)   SpO2 98%   BMI 21.72 kg/m²       Patient is awake and alert  Head and neck atraumatic, normocephalic. ENT: No hoarse voice  Cardiac system regular rate rhythm. Pulmonary no audible wheeze  Chest wall excursion normal with respiration cycle  Abdomen is soft not particularly distended. Neurologically nonfocal.  Skin is warm and moist.  Psychosocial: Cooperative. Vascular examination as previously noted no changes. Recent Results (from the past 24 hour(s))   CBC WITH AUTOMATED DIFF    Collection Time: 06/08/22  7:59 AM   Result Value Ref Range    WBC 6.3 3.6 - 11.0 K/uL    RBC 4.08 3.80 - 5.20 M/uL    HGB 13.1 11.5 - 16.0 g/dL    HCT 38.4 35.0 - 47.0 %    MCV 94.1 80.0 - 99.0 FL    MCH 32.1 26.0 - 34.0 PG    MCHC 34.1 30.0 - 36.5 g/dL    RDW 11.8 11.5 - 14.5 %    PLATELET 402 898 - 214 K/uL    MPV 11.2 8.9 - 12.9 FL    NRBC 0.0 0.0  WBC    ABSOLUTE NRBC 0.00 0.00 - 0.01 K/uL    NEUTROPHILS 69 32 - 75 %    LYMPHOCYTES 19 12 - 49 %    MONOCYTES 10 5 - 13 %    EOSINOPHILS 1 0 - 7 %    BASOPHILS 1 0 - 1 %    IMMATURE GRANULOCYTES 0 0 - 0.5 %    ABS. NEUTROPHILS 4.4 1.8 - 8.0 K/UL    ABS. LYMPHOCYTES 1.2 0.8 - 3.5 K/UL    ABS. MONOCYTES 0.6 0.0 - 1.0 K/UL    ABS. EOSINOPHILS 0.1 0.0 - 0.4 K/UL    ABS. BASOPHILS 0.0 0.0 - 0.1 K/UL    ABS. IMM.  GRANS. 0.0 0.00 - 0.04 K/UL    DF AUTOMATED     METABOLIC PANEL, BASIC    Collection Time: 06/08/22  7:59 AM   Result Value Ref Range    Sodium 140 136 - 145 mmol/L    Potassium 3.2 (L) 3.5 - 5.1 mmol/L    Chloride 105 97 - 108 mmol/L    CO2 26 21 - 32 mmol/L    Anion gap 9 5 - 15 mmol/L Glucose 88 65 - 100 mg/dL    BUN 12 6 - 20 mg/dL    Creatinine 0.54 (L) 0.55 - 1.02 mg/dL    BUN/Creatinine ratio 22 (H) 12 - 20      GFR est AA >60 >60 ml/min/1.73m2    GFR est non-AA >60 >60 ml/min/1.73m2    Calcium 9.9 8.5 - 10.1 mg/dL       ASSESSMENT:   Patient is 45 y.o. with diagnosis of : Active Problems:    SBO (small bowel obstruction) ([facility] [address] (6/4/2022)        PLAN:                 Patient is thinking about transferring different hospital.  If patient decides to have surgery I will schedule surgery later today.   If not we will address patient's request.

## 2022-06-09 NOTE — PROGRESS NOTES
Walked into patients room and she stated she hit her right hand, wrist, and hip on the side of the bed while attempting to get back in bed after she came out the bathroom. There is no swelling or redness noted. Patient able to move all extremities. Patient stated with a clear mind that she didn't fall, second witness Louisa Mcdowell RN also asked if she fell and patient stated no. Md aware and gave orders for xrays.

## 2022-06-09 NOTE — PROGRESS NOTES
Bedside shift change report given to SEEMA Collins (oncoming nurse) by Edmundo Ng RN (offgoing nurse). Report included the following information SBAR, Procedure Summary, Intake/Output, MAR and Recent Results.

## 2022-06-09 NOTE — PROGRESS NOTES
Infectious Disease Progress Note          Subjective:   Assessed pt at bedside, Remains NPO, hasnt had a BM, passed gas a couple of times today.  No acute events since last seen   Objective:   Physical Exam:     Visit Vitals  BP (!) 153/102   Pulse 73   Temp 98 °F (36.7 °C)   Resp 18   Ht 5' 7\" (1.702 m)   Wt 138 lb 10.7 oz (62.9 kg)   SpO2 100%   BMI 21.72 kg/m²      O2 Device: None (Room air)    Temp (24hrs), Av.1 °F (36.7 °C), Min:98 °F (36.7 °C), Max:98.3 °F (36.8 °C)    701 - 1900  In: -   Out: 500    1901 -  07  In: -   Out: 250     General: NAD, AAO x 4  HEENT: MERLYN, Moist mucosa, NGT in place   Lungs: CTA b/l, decreased at the bases   Heart: S1S2+, RRR, no murmur  Abdo: Soft, ND, hypoactive BS, organomegally   :  No soliman cath   Exts: No edema, + pulses b/l   Skin: No wounds, No rashes or lesions      Data Review:       Recent Days:  Recent Labs     22  1009 22  0759 22  0856   WBC 5.2 6.3 5.9   HGB 13.5 13.1 13.8   HCT 39.5 38.4 42.1    153 153     Recent Labs     22  1009 22  0759 22  0856   BUN 8 12 17   CREA 0.48* 0.54* 0.75       Lab Results   Component Value Date/Time    C-Reactive protein 5.23 (H) 2022 10:31 AM        Microbiology     Results     Procedure Component Value Units Date/Time    CULTURE, URINE [[id number]] Collected: 22 1030    Order Status: Completed Specimen: Urine Updated: 22 1139     Special Requests: --        No Special Requests  Reflexed from [id number]       [place] Count 15,000        [place] Count colonies/ml        Culture result:       Mixed urogenital mukul isolated          INFLUENZA A & B AG (RAPID TEST) [[id number]] Collected: 22 1035    Order Status: Completed Specimen: Nasopharyngeal from Nasal washing Updated: 22 1107     Influenza A Antigen Negative        Influenza B Antigen Negative                Diagnostics   CXR Results  (Last 48 hours)    None Assessment/Plan     1. Trichomonas vaginitis, incidental finding on UA (06//01), asymptomatic             On metronidazole, Day # 5/7        Routine labs in the morning     2. UTI, abnormal UA, Mixed urogenital mukul isolated from Cx      Remains asymptomatic, s/p 3 days levaquin     3. Early/partial small bowel obstruction, NGT remains in place        Dilated bowel loops on repeat CT abdo/pel (06/06)        Hypoactive bowel sounds, ND, not passing gas, hasnt had a BM for days       Surgery recommended, pt non-committal. Considering transfer to [address] [place] [address], wants to discuss w fmly      4. H/o ovarian Ca in 2015: S/p abdominal hysterectomy, and b/l oophorectomy. Ca 125 is normal at 2    5. Abdominal pain: Due to # 3.  Continue symptomatic mgt      Daniel Tejada MD    6/9/2022

## 2022-06-10 LAB
ANION GAP SERPL CALC-SCNC: 11 MMOL/L (ref 5–15)
BASOPHILS # BLD: 0 K/UL (ref 0–0.1)
BASOPHILS NFR BLD: 0 % (ref 0–1)
BUN SERPL-MCNC: 8 MG/DL (ref 6–20)
BUN/CREAT SERPL: 17 (ref 12–20)
CA-I BLD-MCNC: 10.4 MG/DL (ref 8.5–10.1)
CHLORIDE SERPL-SCNC: 105 MMOL/L (ref 97–108)
CO2 SERPL-SCNC: 22 MMOL/L (ref 21–32)
CREAT SERPL-MCNC: 0.48 MG/DL (ref 0.55–1.02)
DIFFERENTIAL METHOD BLD: NORMAL
EOSINOPHIL # BLD: 0 K/UL (ref 0–0.4)
EOSINOPHIL NFR BLD: 0 % (ref 0–7)
ERYTHROCYTE [DISTWIDTH] IN BLOOD BY AUTOMATED COUNT: 11.5 % (ref 11.5–14.5)
GLUCOSE SERPL-MCNC: 94 MG/DL (ref 65–100)
HCT VFR BLD AUTO: 42.1 % (ref 35–47)
HGB BLD-MCNC: 14.3 G/DL (ref 11.5–16)
IMM GRANULOCYTES # BLD AUTO: 0 K/UL (ref 0–0.04)
IMM GRANULOCYTES NFR BLD AUTO: 0 % (ref 0–0.5)
LYMPHOCYTES # BLD: 1.2 K/UL (ref 0.8–3.5)
LYMPHOCYTES NFR BLD: 21 % (ref 12–49)
MCH RBC QN AUTO: 31.6 PG (ref 26–34)
MCHC RBC AUTO-ENTMCNC: 34 G/DL (ref 30–36.5)
MCV RBC AUTO: 93.1 FL (ref 80–99)
MONOCYTES # BLD: 0.6 K/UL (ref 0–1)
MONOCYTES NFR BLD: 10 % (ref 5–13)
NEUTS SEG # BLD: 4 K/UL (ref 1.8–8)
NEUTS SEG NFR BLD: 69 % (ref 32–75)
NRBC # BLD: 0 K/UL (ref 0–0.01)
NRBC BLD-RTO: 0 PER 100 WBC
PLATELET # BLD AUTO: 196 K/UL (ref 150–400)
PMV BLD AUTO: 11.1 FL (ref 8.9–12.9)
POTASSIUM SERPL-SCNC: 3.8 MMOL/L (ref 3.5–5.1)
RBC # BLD AUTO: 4.52 M/UL (ref 3.8–5.2)
SODIUM SERPL-SCNC: 138 MMOL/L (ref 136–145)
WBC # BLD AUTO: 5.9 K/UL (ref 3.6–11)

## 2022-06-10 PROCEDURE — 85025 COMPLETE CBC W/AUTO DIFF WBC: CPT

## 2022-06-10 PROCEDURE — 36573 INSJ PICC RS&I 5 YR+: CPT | Performed by: NURSE PRACTITIONER

## 2022-06-10 PROCEDURE — 99232 SBSQ HOSP IP/OBS MODERATE 35: CPT | Performed by: SURGERY

## 2022-06-10 PROCEDURE — 65270000029 HC RM PRIVATE

## 2022-06-10 PROCEDURE — 74011250636 HC RX REV CODE- 250/636: Performed by: STUDENT IN AN ORGANIZED HEALTH CARE EDUCATION/TRAINING PROGRAM

## 2022-06-10 PROCEDURE — 74011250636 HC RX REV CODE- 250/636: Performed by: INTERNAL MEDICINE

## 2022-06-10 PROCEDURE — 02HV33Z INSERTION OF INFUSION DEVICE INTO SUPERIOR VENA CAVA, PERCUTANEOUS APPROACH: ICD-10-PCS | Performed by: NURSE PRACTITIONER

## 2022-06-10 PROCEDURE — 80048 BASIC METABOLIC PNL TOTAL CA: CPT

## 2022-06-10 PROCEDURE — 74011000250 HC RX REV CODE- 250: Performed by: NURSE PRACTITIONER

## 2022-06-10 PROCEDURE — 74011250636 HC RX REV CODE- 250/636: Performed by: NURSE PRACTITIONER

## 2022-06-10 PROCEDURE — 36569 INSJ PICC 5 YR+ W/O IMAGING: CPT

## 2022-06-10 PROCEDURE — 74011250636 HC RX REV CODE- 250/636: Performed by: SURGERY

## 2022-06-10 PROCEDURE — 99232 SBSQ HOSP IP/OBS MODERATE 35: CPT | Performed by: INTERNAL MEDICINE

## 2022-06-10 PROCEDURE — 94762 N-INVAS EAR/PLS OXIMTRY CONT: CPT

## 2022-06-10 PROCEDURE — 36415 COLL VENOUS BLD VENIPUNCTURE: CPT

## 2022-06-10 PROCEDURE — 74011000250 HC RX REV CODE- 250: Performed by: INTERNAL MEDICINE

## 2022-06-10 RX ADMIN — METRONIDAZOLE 500 MG: 500 INJECTION, SOLUTION INTRAVENOUS at 10:44

## 2022-06-10 RX ADMIN — SODIUM CHLORIDE, PRESERVATIVE FREE 10 ML: 5 INJECTION INTRAVENOUS at 21:49

## 2022-06-10 RX ADMIN — SODIUM CHLORIDE, PRESERVATIVE FREE 20 MG: 5 INJECTION INTRAVENOUS at 20:29

## 2022-06-10 RX ADMIN — ENOXAPARIN SODIUM 40 MG: 100 INJECTION SUBCUTANEOUS at 10:37

## 2022-06-10 RX ADMIN — SODIUM CHLORIDE, PRESERVATIVE FREE 10 ML: 5 INJECTION INTRAVENOUS at 07:05

## 2022-06-10 RX ADMIN — SODIUM CHLORIDE, PRESERVATIVE FREE 20 MG: 5 INJECTION INTRAVENOUS at 10:36

## 2022-06-10 RX ADMIN — SOYBEAN OIL 250 ML: 20 INJECTION, SOLUTION INTRAVENOUS at 21:49

## 2022-06-10 RX ADMIN — METRONIDAZOLE 500 MG: 500 INJECTION, SOLUTION INTRAVENOUS at 21:48

## 2022-06-10 RX ADMIN — ONDANSETRON 4 MG: 2 INJECTION INTRAMUSCULAR; INTRAVENOUS at 10:37

## 2022-06-10 RX ADMIN — HYDRALAZINE HYDROCHLORIDE 10 MG: 20 INJECTION, SOLUTION INTRAMUSCULAR; INTRAVENOUS at 17:41

## 2022-06-10 RX ADMIN — SODIUM CHLORIDE, POTASSIUM CHLORIDE, SODIUM LACTATE AND CALCIUM CHLORIDE 100 ML/HR: 600; 310; 30; 20 INJECTION, SOLUTION INTRAVENOUS at 10:40

## 2022-06-10 RX ADMIN — HYDROMORPHONE HYDROCHLORIDE 1 MG: 1 INJECTION, SOLUTION INTRAMUSCULAR; INTRAVENOUS; SUBCUTANEOUS at 10:37

## 2022-06-10 RX ADMIN — HYDROMORPHONE HYDROCHLORIDE 1 MG: 1 INJECTION, SOLUTION INTRAMUSCULAR; INTRAVENOUS; SUBCUTANEOUS at 20:23

## 2022-06-10 RX ADMIN — HYDROMORPHONE HYDROCHLORIDE 1 MG: 1 INJECTION, SOLUTION INTRAMUSCULAR; INTRAVENOUS; SUBCUTANEOUS at 15:18

## 2022-06-10 RX ADMIN — METOCLOPRAMIDE HYDROCHLORIDE 10 MG: 5 INJECTION INTRAMUSCULAR; INTRAVENOUS at 12:42

## 2022-06-10 RX ADMIN — METOCLOPRAMIDE HYDROCHLORIDE 10 MG: 5 INJECTION INTRAMUSCULAR; INTRAVENOUS at 00:02

## 2022-06-10 RX ADMIN — ASCORBIC ACID, VITAMIN A PALMITATE, CHOLECALCIFEROL, THIAMINE HYDROCHLORIDE, RIBOFLAVIN-5 PHOSPHATE SODIUM, PYRIDOXINE HYDROCHLORIDE, NIACINAMIDE, DEXPANTHENOL, ALPHA-TOCOPHEROL ACETATE, VITAMIN K1, FOLIC ACID, BIOTIN, CYANOCOBALAMIN: 200; 3300; 200; 6; 3.6; 6; 40; 15; 10; 150; 600; 60; 5 INJECTION, SOLUTION INTRAVENOUS at 21:48

## 2022-06-10 RX ADMIN — METOCLOPRAMIDE HYDROCHLORIDE 10 MG: 5 INJECTION INTRAMUSCULAR; INTRAVENOUS at 17:37

## 2022-06-10 RX ADMIN — METOCLOPRAMIDE HYDROCHLORIDE 10 MG: 5 INJECTION INTRAMUSCULAR; INTRAVENOUS at 07:05

## 2022-06-10 NOTE — PROGRESS NOTES
Problem: Falls - Risk of  Goal: *Absence of Falls  Description: Document Kamran Davis Fall Risk and appropriate interventions in the flowsheet.   Outcome: Progressing Towards Goal  Note: Fall Risk Interventions:            Medication Interventions: Bed/chair exit alarm,Patient to call before getting OOB,Teach patient to arise slowly    Elimination Interventions: Bed/chair exit alarm,Call light in reach              Problem: Patient Education: Go to Patient Education Activity  Goal: Patient/Family Education  Outcome: Progressing Towards Goal     Problem: General Medical Care Plan  Goal: *Vital signs within specified parameters  Outcome: Progressing Towards Goal  Goal: *Absence of infection signs and symptoms  Outcome: Progressing Towards Goal

## 2022-06-10 NOTE — PROGRESS NOTES
PROGRESS NOTE      Chief Complaints:  No new complaints  HPI and  Objective:    Patient denies any bowel activities or passing gas. No fever overnight. Vital signs stable. Patient has decided to have surgery. Review of Systems:  Rest of review of system negative, personally reviewed. EXAM:  Visit Vitals  /64   Pulse 71   Temp 98.7 °F (37.1 °C)   Resp 20   Ht 5' 7\" (1.702 m)   Wt 138 lb 10.7 oz (62.9 kg)   SpO2 100%   BMI 21.72 kg/m²       Patient is awake alert  Head and neck atraumatic, normocephalic. ENT: No hoarse voice  Cardiac system regular rate rhythm. Pulmonary no audible wheeze  Chest wall excursion normal with respiration cycle  Abdomen is soft not particularly distended. Neurologically nonfocal.  Skin is warm and moist.  Psychosocial: Cooperative. Vascular examination as previously noted no changes. Recent Results (from the past 24 hour(s))   CBC WITH AUTOMATED DIFF    Collection Time: 06/09/22 10:09 AM   Result Value Ref Range    WBC 5.2 3.6 - 11.0 K/uL    RBC 4.23 3.80 - 5.20 M/uL    HGB 13.5 11.5 - 16.0 g/dL    HCT 39.5 35.0 - 47.0 %    MCV 93.4 80.0 - 99.0 FL    MCH 31.9 26.0 - 34.0 PG    MCHC 34.2 30.0 - 36.5 g/dL    RDW 11.4 (L) 11.5 - 14.5 %    PLATELET 951 277 - 107 K/uL    MPV 11.1 8.9 - 12.9 FL    NRBC 0.0 0.0  WBC    ABSOLUTE NRBC 0.00 0.00 - 0.01 K/uL    NEUTROPHILS 63 32 - 75 %    LYMPHOCYTES 25 12 - 49 %    MONOCYTES 10 5 - 13 %    EOSINOPHILS 2 0 - 7 %    BASOPHILS 0 0 - 1 %    IMMATURE GRANULOCYTES 0 0 - 0.5 %    ABS. NEUTROPHILS 3.3 1.8 - 8.0 K/UL    ABS. LYMPHOCYTES 1.3 0.8 - 3.5 K/UL    ABS. MONOCYTES 0.5 0.0 - 1.0 K/UL    ABS. EOSINOPHILS 0.1 0.0 - 0.4 K/UL    ABS. BASOPHILS 0.0 0.0 - 0.1 K/UL    ABS. IMM.  GRANS. 0.0 0.00 - 0.04 K/UL    DF AUTOMATED     METABOLIC PANEL, BASIC    Collection Time: 06/09/22 10:09 AM   Result Value Ref Range    Sodium 138 136 - 145 mmol/L    Potassium 3.5 3.5 - 5.1 mmol/L    Chloride 104 97 - 108 mmol/L    CO2 26 21 - 32 mmol/L Anion gap 8 5 - 15 mmol/L    Glucose 83 65 - 100 mg/dL    BUN 8 6 - 20 mg/dL    Creatinine 0.48 (L) 0.55 - 1.02 mg/dL    BUN/Creatinine ratio 17 12 - 20      GFR est AA >60 >60 ml/min/1.73m2    GFR est non-AA >60 >60 ml/min/1.73m2    Calcium 10.0 8.5 - 10.1 mg/dL       ASSESSMENT:   Patient is 45 y.o. with diagnosis of : Active Problems:    SBO (small bowel obstruction) ([facility] [address] (6/4/2022)        PLAN:                 Patient finally decided with surgery. Patient came to the hospital with a bowel obstruction. This was a observed for 6 days without any bowel activity. Repeat CT scan shows persistent small bowel obstruction. Patient was offered surgery a few days ago however patient has declined that time. Now patient decided to have surgery. I will be out of town this weekend, I will be signing out this patient surgeon on-call.

## 2022-06-10 NOTE — PROGRESS NOTES
Infectious Disease Progress Note          Subjective:   Stable, denies new complaints, now agreeable to bowel surgery, but Dr Adri Driscoll will be out of town over the wknd. Started on TPN   Objective:   Physical Exam:     Visit Vitals  BP (!) 144/88 (BP 1 Location: Right upper arm, BP Patient Position: Lying)   Pulse 64   Temp 97.3 °F (36.3 °C)   Resp 16   Ht 5' 7\" (1.702 m)   Wt 138 lb 10.7 oz (62.9 kg)   SpO2 99%   BMI 21.72 kg/m²      O2 Device: None (Room air)    Temp (24hrs), Av.1 °F (36.7 °C), Min:97.3 °F (36.3 °C), Max:98.7 °F (37.1 °C)    No intake/output data recorded.     1901 - 06/10 0700  In: -   Out: 500     General: NAD, AAO x 4  HEENT: MERLYN, Moist mucosa, NGT in place   Lungs: CTA b/l, decreased at the bases   Heart: S1S2+, RRR, no murmur  Abdo: Soft, ND, hypoactive BS, organomegally   :  No soliman cath   Exts: No edema, + pulses b/l   Skin: No wounds, No rashes or lesions      Data Review:       Recent Days:  Recent Labs     06/10/22  0812 22  1009 22  0759   WBC 5.9 5.2 6.3   HGB 14.3 13.5 13.1   HCT 42.1 39.5 38.4    163 153     Recent Labs     06/10/22  0812 22  1009 22  0759   BUN 8 8 12   CREA 0.48* 0.48* 0.54*       Lab Results   Component Value Date/Time    C-Reactive protein 5.23 (H) 2022 10:31 AM        Microbiology     Results     Procedure Component Value Units Date/Time    CULTURE, URINE [[id number]] Collected: 22 1030    Order Status: Completed Specimen: Urine Updated: 22 1139     Special Requests: --        No Special Requests  Reflexed from [id number]       [place] Count 15,000        [place] Count colonies/ml        Culture result:       Mixed urogenital mukul isolated          INFLUENZA A & B AG (RAPID TEST) [[id number]] Collected: 22 1035    Order Status: Completed Specimen: Nasopharyngeal from Nasal washing Updated: 22 1107     Influenza A Antigen Negative        Influenza B Antigen Negative Diagnostics   CXR Results  (Last 48 hours)    None           Assessment/Plan     1. Trichomonas vaginitis, incidental finding on UA (06//01), asymptomatic         Remains afebrile w a normal WBC on routine labs            On metronidazole, Day # 6/7, d/c after last dose on 06/11    2. UTI, abnormal UA, Mixed urogenital mukul isolated from Cx      Remains asymptomatic, s/p 3 days levaquin     3. Early/partial small bowel obstruction, NGT remains in place        Dilated bowel loops on repeat CT abdo/pel (06/06)        Hypoactive bowel sounds, Still not passing gas, hasn't had a BM for days       Now agreeable to surgery but Dr Lazcano Chol is out of town       Will broaden antimicrobial coverage w Zosyn if pt does undergo surgery          4. H/o ovarian Ca in 2015: S/p abdominal hysterectomy, and b/l oophorectomy. Ca 125 is normal at 2    5. Abdominal pain: Due to # 3.  Continue symptomatic mgt        Amada Maya MD    6/10/2022

## 2022-06-10 NOTE — PROGRESS NOTES
PICC Placement Note    PRE-PROCEDURE VERIFICATION  Correct Procedure: yes  Correct Site:  yes  Temperature: Temp: 97.3 °F (36.3 °C), Temperature Source: Temp Source: Oral  Recent Labs     06/10/22  0812   BUN 8   CREA 0.48*      WBC 5.9     Allergies: Patient has no known allergies. Education materials for PICC Care given to family: yes. See Patient Education activity for further details. PICC Booklet placed on chart: yes    PROCEDURE DETAIL  A double lumen PICC line was started for TPN. The following documentation is in addition to the PICC properties in the lines/airways flowsheet :  Lot #: [id number]  xylocaine used: yes  Mid-Arm Circumference: 25 (cm)  Internal Catheter Length: 36 (cm)  Internal Catheter Total Length: 36 (cm)  Vein Selection for PICC:right brachial  Central Line Bundle followed yes  Complication Related to Insertion: none    The placement was verified by ECG: yes. The ECG indicates the tip location is on the right side and the tip overlies the lower superior vena cava.  CAJ    Line is okay to use: yes    Chandra Henning RN

## 2022-06-10 NOTE — PROGRESS NOTES
Hospitalist Progress Note            Daily Progress Note: 6/10/2022 2:13 PM  Hospital course:     Carie Hernandez is a 45year-old female with a PMHx significant for ovarian cancer s/p hysterectomy & oophorectomy and hypertension who is presenting to the ED for persistent nausea and vomiting. She was recently seen at [facility] on 06/01 where she initially presented with n/v/abd pain. She was given Bactrim for UTI and PPI. Returned today for obstipation, n/v, abd pain. She does report bowel movement on 06/03. She is being followed by Abbe Moran/Yovani for Breast Lesion. Cancer resection in 2016 and is in remission now. In the ED, hemodynamically stable. No BRBPR or hematemesis. CBC and CMP unremarkable other than slightly elevated calcium level. CT abd/pelvis showing early/partial SBO. General surgery consult. NG tube placed. Urinalysis from 06/01 positive for leukocyte esterase and trichomonas. Started on IV Flagyl and Levaquin. ID consult. Repeat CT of chest and abdomen without change. After 6 days with no improvement patient has consented for surgical intervention. PICC line placed to initiate TPN/IL. Subjective: Follow-up examination of patient at the bedside. Still no gas or bowel movement. Spoke with the surgeon and would like to now have surgical intervention. Continues to feel weak with abdominal pain. Assessment/Plan:   Active Problems:    SBO (small bowel obstruction) ([address] (6/4/2022)      1. Subacute SBO (Hx of Abd Surgery for Ovarian CA)  - NG tube   - NPO  - Surgery following-if no improvement in the next day or so will schedule exploratory lap  - IVF  - IV Pepcid  - Sx control  -Repeat CT without change     2. UTI  - UA positive for leukocyte esterase and trichomonas  - IV flagyl and levaquin  - IV fluids  - ID following  - Urine culture pending     3. HTN  - Norvasc 5 mg     4.  Ovarian CA in Remission  - Followed by Abbe Sheldon for Breast Lesion  - Cancer resection in 2016 and is in remission     5.  Anxiety  - Continue Effexor      DVT Prophylaxis: Lovenox  Code Status: Full Code  POA/NOK:    Disposition and discharge barriers:    SBO resolution   UTI, cultures pending  Care Plan discussed with: Patient, family member, staff, IDR team    Current Facility-Administered Medications   Medication Dose Route Frequency    TPN ADULT-PERIPHERAL AA 4.25% D5% + ELECTROLYTES   IntraVENous CONTINUOUS    fat emulsion 20% (LIPOSYN, INTRAlipid) infusion 250 mL  250 mL IntraVENous Q MON, WED & FRI    metoclopramide HCl (REGLAN) injection 10 mg  10 mg IntraVENous Q6H    hydrALAZINE (APRESOLINE) 20 mg/mL injection 10 mg  10 mg IntraVENous Q4H PRN    benzocaine-menthoL (CEPACOL) lozenge 1 Lozenge  1 Lozenge Mucous Membrane PRN    metroNIDAZOLE (FLAGYL) IVPB premix 500 mg  500 mg IntraVENous Q12H    ketorolac (TORADOL) injection 30 mg  30 mg IntraVENous Q6H PRN    sodium chloride (NS) flush 5-40 mL  5-40 mL IntraVENous Q8H    sodium chloride (NS) flush 5-40 mL  5-40 mL IntraVENous PRN    acetaminophen (TYLENOL) tablet 650 mg  650 mg Oral Q6H PRN    Or    acetaminophen (TYLENOL) suppository 650 mg  650 mg Rectal Q6H PRN    polyethylene glycol (MIRALAX) packet 17 g  17 g Oral DAILY PRN    ondansetron (ZOFRAN ODT) tablet 4 mg  4 mg Oral Q8H PRN    Or    ondansetron (ZOFRAN) injection 4 mg  4 mg IntraVENous Q6H PRN    enoxaparin (LOVENOX) injection 40 mg  40 mg SubCUTAneous DAILY    lactated Ringers infusion  100 mL/hr IntraVENous CONTINUOUS    amLODIPine (NORVASC) tablet 5 mg  5 mg Oral DAILY    gabapentin (NEURONTIN) capsule 100 mg  100 mg Oral DAILY    venlafaxine-SR (EFFEXOR-XR) capsule 37.5 mg  37.5 mg Oral DAILY    famotidine (PF) (PEPCID) 20 mg in 0.9% sodium chloride 10 mL injection  20 mg IntraVENous Q12H    HYDROmorphone (DILAUDID) injection 1 mg  1 mg IntraVENous Q4H PRN        REVIEW OF SYSTEMS    Review of Systems   Constitutional: Positive for weight loss.   Respiratory: Negative for cough. Cardiovascular: Negative for chest pain. Gastrointestinal: Positive for abdominal pain. Genitourinary: Positive for dysuria. Musculoskeletal: Positive for myalgias. Psychiatric/Behavioral: The patient is nervous/anxious. Objective:     Visit Vitals  BP (!) 144/88 (BP 1 Location: Right upper arm, BP Patient Position: Lying)   Pulse 64   Temp 97.3 °F (36.3 °C)   Resp 16   Ht 5' 7\" (1.702 m)   Wt 62.9 kg (138 lb 10.7 oz)   SpO2 100%   BMI 21.72 kg/m²      O2 Device: None (Room air)    Temp (24hrs), Av.1 °F (36.7 °C), Min:97.3 °F (36.3 °C), Max:98.7 °F (37.1 °C)      No intake/output data recorded.  1901 - 06/10 0700  In: -   Out: 500     PHYSICAL EXAM:    Physical Exam  Constitutional:       Appearance: She is ill-appearing. HENT:      Nose: No congestion. Cardiovascular:      Rate and Rhythm: Regular rhythm. Bradycardia present. Pulmonary:      Effort: No respiratory distress. Abdominal:      General: There is distension. Tenderness: There is abdominal tenderness. Musculoskeletal:         General: Normal range of motion. Skin:     General: Skin is warm. Data Review    Recent Results (from the past 24 hour(s))   CBC WITH AUTOMATED DIFF    Collection Time: 06/10/22  8:12 AM   Result Value Ref Range    WBC 5.9 3.6 - 11.0 K/uL    RBC 4.52 3.80 - 5.20 M/uL    HGB 14.3 11.5 - 16.0 g/dL    HCT 42.1 35.0 - 47.0 %    MCV 93.1 80.0 - 99.0 FL    MCH 31.6 26.0 - 34.0 PG    MCHC 34.0 30.0 - 36.5 g/dL    RDW 11.5 11.5 - 14.5 %    PLATELET 411 673 - 798 K/uL    MPV 11.1 8.9 - 12.9 FL    NRBC 0.0 0.0  WBC    ABSOLUTE NRBC 0.00 0.00 - 0.01 K/uL    NEUTROPHILS 69 32 - 75 %    LYMPHOCYTES 21 12 - 49 %    MONOCYTES 10 5 - 13 %    EOSINOPHILS 0 0 - 7 %    BASOPHILS 0 0 - 1 %    IMMATURE GRANULOCYTES 0 0 - 0.5 %    ABS. NEUTROPHILS 4.0 1.8 - 8.0 K/UL    ABS. LYMPHOCYTES 1.2 0.8 - 3.5 K/UL    ABS. MONOCYTES 0.6 0.0 - 1.0 K/UL    ABS. EOSINOPHILS 0.0 0.0 - 0.4 K/UL    ABS. BASOPHILS 0.0 0.0 - 0.1 K/UL    ABS. IMM. GRANS. 0.0 0.00 - 0.04 K/UL    DF AUTOMATED     METABOLIC PANEL, BASIC    Collection Time: 06/10/22  8:12 AM   Result Value Ref Range    Sodium 138 136 - 145 mmol/L    Potassium 3.8 3.5 - 5.1 mmol/L    Chloride 105 97 - 108 mmol/L    CO2 22 21 - 32 mmol/L    Anion gap 11 5 - 15 mmol/L    Glucose 94 65 - 100 mg/dL    BUN 8 6 - 20 mg/dL    Creatinine 0.48 (L) 0.55 - 1.02 mg/dL    BUN/Creatinine ratio 17 12 - 20      GFR est AA >60 >60 ml/min/1.73m2    GFR est non-AA >60 >60 ml/min/1.73m2    Calcium 10.4 (H) 8.5 - 10.1 mg/dL       XR WRIST RT AP/LAT   Final Result      XR HIP RT W OR WO PELV 2-3 VWS   Final Result   No fracture or destructive lesion is seen. The joint spaces are   maintained, as are the adjacent soft tissues. XR HAND RT MIN 3 V   Final Result   No fracture or destructive lesion is seen. The joint spaces are   maintained, as are the adjacent soft tissues. CT ABD PELV WO CONT   Final Result   Redemonstration of the distended small bowel loops. .         XR ABD FLAT/ ERECT   Final Result   NG tube position as above. Early/partial small bowel obstruction. CT ABD PELV W CONT   Final Result   Early/partial small bowel obstruction. _____________________________________________________________________________  Time spent in direct care including coordination of service, review of data and examination: > 35 minutes    ______________________________________________________________________________    Elana Echols NP    This is dictation was done by dragon, computer voice recognition software. Quite often unanticipated grammatical, syntax, homophones and other interpretive errors or inadvertently transcribed by the computer software. Please excuse errors that have escaped final proofreading. Thank you.

## 2022-06-11 LAB
ANION GAP SERPL CALC-SCNC: 8 MMOL/L (ref 5–15)
BASOPHILS # BLD: 0 K/UL (ref 0–0.1)
BASOPHILS NFR BLD: 0 % (ref 0–1)
BUN SERPL-MCNC: 5 MG/DL (ref 6–20)
BUN/CREAT SERPL: 11 (ref 12–20)
CA-I BLD-MCNC: 9 MG/DL (ref 8.5–10.1)
CHLORIDE SERPL-SCNC: 105 MMOL/L (ref 97–108)
CO2 SERPL-SCNC: 25 MMOL/L (ref 21–32)
CREAT SERPL-MCNC: 0.45 MG/DL (ref 0.55–1.02)
DIFFERENTIAL METHOD BLD: NORMAL
EOSINOPHIL # BLD: 0.1 K/UL (ref 0–0.4)
EOSINOPHIL NFR BLD: 1 % (ref 0–7)
ERYTHROCYTE [DISTWIDTH] IN BLOOD BY AUTOMATED COUNT: 11.6 % (ref 11.5–14.5)
GLUCOSE BLD STRIP.AUTO-MCNC: 110 MG/DL (ref 65–117)
GLUCOSE BLD STRIP.AUTO-MCNC: 96 MG/DL (ref 65–117)
GLUCOSE SERPL-MCNC: 176 MG/DL (ref 65–100)
HCT VFR BLD AUTO: 37.3 % (ref 35–47)
HGB BLD-MCNC: 13 G/DL (ref 11.5–16)
IMM GRANULOCYTES # BLD AUTO: 0 K/UL (ref 0–0.04)
IMM GRANULOCYTES NFR BLD AUTO: 0 % (ref 0–0.5)
LYMPHOCYTES # BLD: 1.6 K/UL (ref 0.8–3.5)
LYMPHOCYTES NFR BLD: 22 % (ref 12–49)
MCH RBC QN AUTO: 32.9 PG (ref 26–34)
MCHC RBC AUTO-ENTMCNC: 34.9 G/DL (ref 30–36.5)
MCV RBC AUTO: 94.4 FL (ref 80–99)
MONOCYTES # BLD: 0.8 K/UL (ref 0–1)
MONOCYTES NFR BLD: 11 % (ref 5–13)
NEUTS SEG # BLD: 4.5 K/UL (ref 1.8–8)
NEUTS SEG NFR BLD: 66 % (ref 32–75)
NRBC # BLD: 0 K/UL (ref 0–0.01)
NRBC BLD-RTO: 0 PER 100 WBC
PERFORMED BY, TECHID: NORMAL
PERFORMED BY, TECHID: NORMAL
PLATELET # BLD AUTO: 177 K/UL (ref 150–400)
PMV BLD AUTO: 11.9 FL (ref 8.9–12.9)
POTASSIUM SERPL-SCNC: 3.9 MMOL/L (ref 3.5–5.1)
RBC # BLD AUTO: 3.95 M/UL (ref 3.8–5.2)
SODIUM SERPL-SCNC: 138 MMOL/L (ref 136–145)
WBC # BLD AUTO: 7 K/UL (ref 3.6–11)

## 2022-06-11 PROCEDURE — 85025 COMPLETE CBC W/AUTO DIFF WBC: CPT

## 2022-06-11 PROCEDURE — 94762 N-INVAS EAR/PLS OXIMTRY CONT: CPT

## 2022-06-11 PROCEDURE — 74011000250 HC RX REV CODE- 250: Performed by: NURSE PRACTITIONER

## 2022-06-11 PROCEDURE — 74011250636 HC RX REV CODE- 250/636: Performed by: INTERNAL MEDICINE

## 2022-06-11 PROCEDURE — 74011250636 HC RX REV CODE- 250/636: Performed by: NURSE PRACTITIONER

## 2022-06-11 PROCEDURE — 74011000250 HC RX REV CODE- 250: Performed by: INTERNAL MEDICINE

## 2022-06-11 PROCEDURE — 82962 GLUCOSE BLOOD TEST: CPT

## 2022-06-11 PROCEDURE — 99232 SBSQ HOSP IP/OBS MODERATE 35: CPT | Performed by: COLON & RECTAL SURGERY

## 2022-06-11 PROCEDURE — 80048 BASIC METABOLIC PNL TOTAL CA: CPT

## 2022-06-11 PROCEDURE — 74011250636 HC RX REV CODE- 250/636: Performed by: STUDENT IN AN ORGANIZED HEALTH CARE EDUCATION/TRAINING PROGRAM

## 2022-06-11 PROCEDURE — 74011250636 HC RX REV CODE- 250/636: Performed by: SURGERY

## 2022-06-11 PROCEDURE — 36415 COLL VENOUS BLD VENIPUNCTURE: CPT

## 2022-06-11 PROCEDURE — 65270000029 HC RM PRIVATE

## 2022-06-11 RX ORDER — INSULIN LISPRO 100 [IU]/ML
INJECTION, SOLUTION INTRAVENOUS; SUBCUTANEOUS
Status: DISCONTINUED | OUTPATIENT
Start: 2022-06-11 | End: 2022-06-12 | Stop reason: HOSPADM

## 2022-06-11 RX ORDER — MAGNESIUM SULFATE 100 %
4 CRYSTALS MISCELLANEOUS AS NEEDED
Status: DISCONTINUED | OUTPATIENT
Start: 2022-06-11 | End: 2022-06-12 | Stop reason: HOSPADM

## 2022-06-11 RX ADMIN — ONDANSETRON 4 MG: 2 INJECTION INTRAMUSCULAR; INTRAVENOUS at 19:43

## 2022-06-11 RX ADMIN — HYDROMORPHONE HYDROCHLORIDE 1 MG: 1 INJECTION, SOLUTION INTRAMUSCULAR; INTRAVENOUS; SUBCUTANEOUS at 16:27

## 2022-06-11 RX ADMIN — SODIUM CHLORIDE, PRESERVATIVE FREE 10 ML: 5 INJECTION INTRAVENOUS at 13:09

## 2022-06-11 RX ADMIN — HYDRALAZINE HYDROCHLORIDE 10 MG: 20 INJECTION, SOLUTION INTRAMUSCULAR; INTRAVENOUS at 16:22

## 2022-06-11 RX ADMIN — HYDROMORPHONE HYDROCHLORIDE 1 MG: 1 INJECTION, SOLUTION INTRAMUSCULAR; INTRAVENOUS; SUBCUTANEOUS at 21:30

## 2022-06-11 RX ADMIN — SODIUM CHLORIDE, POTASSIUM CHLORIDE, SODIUM LACTATE AND CALCIUM CHLORIDE 100 ML/HR: 600; 310; 30; 20 INJECTION, SOLUTION INTRAVENOUS at 00:20

## 2022-06-11 RX ADMIN — ENOXAPARIN SODIUM 40 MG: 100 INJECTION SUBCUTANEOUS at 09:58

## 2022-06-11 RX ADMIN — METOCLOPRAMIDE HYDROCHLORIDE 10 MG: 5 INJECTION INTRAMUSCULAR; INTRAVENOUS at 11:57

## 2022-06-11 RX ADMIN — SODIUM CHLORIDE, POTASSIUM CHLORIDE, SODIUM LACTATE AND CALCIUM CHLORIDE 100 ML/HR: 600; 310; 30; 20 INJECTION, SOLUTION INTRAVENOUS at 13:21

## 2022-06-11 RX ADMIN — SODIUM CHLORIDE, PRESERVATIVE FREE 20 MG: 5 INJECTION INTRAVENOUS at 09:58

## 2022-06-11 RX ADMIN — SODIUM CHLORIDE, PRESERVATIVE FREE 10 ML: 5 INJECTION INTRAVENOUS at 05:05

## 2022-06-11 RX ADMIN — HYDRALAZINE HYDROCHLORIDE 10 MG: 20 INJECTION, SOLUTION INTRAMUSCULAR; INTRAVENOUS at 10:50

## 2022-06-11 RX ADMIN — SODIUM CHLORIDE, PRESERVATIVE FREE 20 MG: 5 INJECTION INTRAVENOUS at 21:29

## 2022-06-11 RX ADMIN — SODIUM CHLORIDE, PRESERVATIVE FREE 10 ML: 5 INJECTION INTRAVENOUS at 21:37

## 2022-06-11 RX ADMIN — HYDROMORPHONE HYDROCHLORIDE 1 MG: 1 INJECTION, SOLUTION INTRAMUSCULAR; INTRAVENOUS; SUBCUTANEOUS at 10:02

## 2022-06-11 RX ADMIN — METRONIDAZOLE 500 MG: 500 INJECTION, SOLUTION INTRAVENOUS at 21:32

## 2022-06-11 RX ADMIN — HYDROMORPHONE HYDROCHLORIDE 1 MG: 1 INJECTION, SOLUTION INTRAMUSCULAR; INTRAVENOUS; SUBCUTANEOUS at 05:04

## 2022-06-11 RX ADMIN — METOCLOPRAMIDE HYDROCHLORIDE 10 MG: 5 INJECTION INTRAMUSCULAR; INTRAVENOUS at 17:29

## 2022-06-11 RX ADMIN — METRONIDAZOLE 500 MG: 500 INJECTION, SOLUTION INTRAVENOUS at 09:55

## 2022-06-11 RX ADMIN — METOCLOPRAMIDE HYDROCHLORIDE 10 MG: 5 INJECTION INTRAMUSCULAR; INTRAVENOUS at 00:16

## 2022-06-11 RX ADMIN — TRACE ELEMENTS INJECTION 4: 7.4; .75; 98; 151 INJECTION, SOLUTION INTRAVENOUS at 22:35

## 2022-06-11 RX ADMIN — METOCLOPRAMIDE HYDROCHLORIDE 10 MG: 5 INJECTION INTRAMUSCULAR; INTRAVENOUS at 05:04

## 2022-06-11 NOTE — PROGRESS NOTES
Problem: Falls - Risk of  Goal: *Absence of Falls  Description: Document Lisa Yanez Fall Risk and appropriate interventions in the flowsheet.   Outcome: Progressing Towards Goal  Note: Fall Risk Interventions:            Medication Interventions: Bed/chair exit alarm,Patient to call before getting OOB,Teach patient to arise slowly    Elimination Interventions: Bed/chair exit alarm,Call light in reach,Toileting schedule/hourly rounds,Patient to call for help with toileting needs              Problem: General Medical Care Plan  Goal: *Vital signs within specified parameters  Outcome: Progressing Towards Goal  Goal: *Labs within defined limits  Outcome: Progressing Towards Goal  Goal: *Absence of infection signs and symptoms  Outcome: Progressing Towards Goal  Goal: *Optimal pain control at patient's stated goal  Outcome: Progressing Towards Goal  Goal: *Skin integrity maintained  Outcome: Progressing Towards Goal  Goal: *Fluid volume balance  Outcome: Progressing Towards Goal  Goal: *Optimize nutritional status  Outcome: Progressing Towards Goal  Goal: *Anxiety reduced or absent  Outcome: Progressing Towards Goal  Goal: *Progressive mobility and function (eg: ADL's)  Outcome: Progressing Towards Goal     Problem: Patient Education: Go to Patient Education Activity  Goal: Patient/Family Education  Outcome: Progressing Towards Goal     Problem: Patient Education: Go to Patient Education Activity  Goal: Patient/Family Education  Outcome: Progressing Towards Goal     Problem: Small Bowel Obstruction: Day 1  Goal: Off Pathway (Use only if patient is Off Pathway)  Outcome: Progressing Towards Goal  Goal: Activity/Safety  Outcome: Progressing Towards Goal  Goal: Consults, if ordered  Outcome: Progressing Towards Goal  Goal: Diagnostic Test/Procedures  Outcome: Progressing Towards Goal  Goal: Nutrition/Diet  Outcome: Progressing Towards Goal  Goal: Medications  Outcome: Progressing Towards Goal  Goal: Respiratory  Outcome: Progressing Towards Goal  Goal: Treatments/Interventions/Procedures  Outcome: Progressing Towards Goal  Goal: Psychosocial  Outcome: Progressing Towards Goal  Goal: *Optimal pain control at patient's stated goal  Outcome: Progressing Towards Goal  Goal: *Adequate urinary output (equal to or greater than 30 milliliters/hour)  Outcome: Progressing Towards Goal  Goal: *Hemodynamically stable  Outcome: Progressing Towards Goal  Goal: *Demonstrates progressive activity  Outcome: Progressing Towards Goal  Goal: *Absence of nausea/vomiting  Outcome: Progressing Towards Goal     Problem: Small Bowel Obstruction: Day 2  Goal: Off Pathway (Use only if patient is Off Pathway)  Outcome: Progressing Towards Goal  Goal: Activity/Safety  Outcome: Progressing Towards Goal  Goal: Consults, if ordered  Outcome: Progressing Towards Goal  Goal: Diagnostic Test/Procedures  Outcome: Progressing Towards Goal  Goal: Nutrition/Diet  Outcome: Progressing Towards Goal  Goal: Discharge Planning  Outcome: Progressing Towards Goal  Goal: Medications  Outcome: Progressing Towards Goal  Goal: Respiratory  Outcome: Progressing Towards Goal  Goal: Treatments/Interventions/Procedures  Outcome: Progressing Towards Goal  Goal: Psychosocial  Outcome: Progressing Towards Goal  Goal: *Optimal pain control at patient's stated goal  Outcome: Progressing Towards Goal  Goal: *Adequate urinary output (equal to or greater than 30 milliliters/hour)  Outcome: Progressing Towards Goal  Goal: *Hemodynamically stable  Outcome: Progressing Towards Goal  Goal: *Demonstrates progressive activity  Outcome: Progressing Towards Goal  Goal: *Absence of nausea/vomiting  Outcome: Progressing Towards Goal  Goal: *Return of normal bowel function  Outcome: Progressing Towards Goal     Problem: Small Bowel Obstruction: Day 3  Goal: Off Pathway (Use only if patient is Off Pathway)  Outcome: Progressing Towards Goal  Goal: Activity/Safety  Outcome: Progressing Towards Goal  Goal: Consults, if ordered  Outcome: Progressing Towards Goal  Goal: Diagnostic Test/Procedures  Outcome: Progressing Towards Goal  Goal: Nutrition/Diet  Outcome: Progressing Towards Goal  Goal: Discharge Planning  Outcome: Progressing Towards Goal  Goal: Medications  Outcome: Progressing Towards Goal  Goal: Respiratory  Outcome: Progressing Towards Goal  Goal: Treatments/Interventions/Procedures  Outcome: Progressing Towards Goal  Goal: Psychosocial  Outcome: Progressing Towards Goal  Goal: *Optimal pain control at patient's stated goal  Outcome: Progressing Towards Goal  Goal: *Adequate urinary output (equal to or greater than 30 milliliters/hour)  Outcome: Progressing Towards Goal  Goal: *Hemodynamically stable  Outcome: Progressing Towards Goal  Goal: *Adequate nutrition  Outcome: Progressing Towards Goal  Goal: *Demonstrates progressive activity  Outcome: Progressing Towards Goal  Goal: *Participates in discharge planning  Outcome: Progressing Towards Goal     Problem: Small Bowel Obstruction: Day 4 to Discharge  Goal: Off Pathway (Use only if patient is Off Pathway)  Outcome: Progressing Towards Goal  Goal: Activity/Safety  Outcome: Progressing Towards Goal  Goal: Nutrition/Diet  Outcome: Progressing Towards Goal  Goal: Discharge Planning  Outcome: Progressing Towards Goal  Goal: Medications  Outcome: Progressing Towards Goal  Goal: Respiratory  Outcome: Progressing Towards Goal  Goal: Treatments/Interventions/Procedures  Outcome: Progressing Towards Goal  Goal: Psychosocial  Outcome: Progressing Towards Goal     Problem: Small Bowel Obstruction - Non Surgical: Discharge Outcomes  Goal: *Hemodynamically stable  Outcome: Progressing Towards Goal  Goal: *Demonstrates independent activity or return to baseline  Outcome: Progressing Towards Goal  Goal: *Optimal pain control at patient's stated goal  Outcome: Progressing Towards Goal  Goal: *Verbalizes understanding and describes prescribed diet  Outcome: Progressing Towards Goal  Goal: *Tolerating diet  Outcome: Progressing Towards Goal  Goal: *Verbalizes name, dosage, time, side effects, and number of days to continue medications  Outcome: Progressing Towards Goal  Goal: *Anxiety reduced or absent  Outcome: Progressing Towards Goal  Goal: *Understands and describes signs and symptoms to report to providers(Stroke Metric)  Outcome: Progressing Towards Goal  Goal: *Describes follow-up/return visits to physicians  Outcome: Progressing Towards Goal  Goal: *Describes available resources and support systems  Outcome: Progressing Towards Goal  Goal: *Active bowel function  Outcome: Progressing Towards Goal

## 2022-06-11 NOTE — PROGRESS NOTES
Hospitalist Progress Note            Daily Progress Note: 6/11/2022 2:13 PM  Hospital course:     Carie Hernandez is a 45year-old female with a PMHx significant for ovarian cancer s/p hysterectomy & oophorectomy and hypertension who is presenting to the ED for persistent nausea and vomiting. She was recently seen at [facility] on 06/01 where she initially presented with n/v/abd pain. She was given Bactrim for UTI and PPI. Returned today for obstipation, n/v, abd pain. She does report bowel movement on 06/03. She is being followed by Blaine Moran/Yovani for Breast Lesion. Cancer resection in 2016 and is in remission now. In the ED, hemodynamically stable. No BRBPR or hematemesis. CBC and CMP unremarkable other than slightly elevated calcium level. CT abd/pelvis showing early/partial SBO. General surgery consult. NG tube placed. Urinalysis from 06/01 positive for leukocyte esterase and trichomonas. Started on IV Flagyl and Levaquin. ID consult. Repeat CT of chest and abdomen without change. After 6 days with no improvement patient has consented for surgical intervention. PICC line placed to initiate TPN/IL. Currently on metronidazole treatment for trichomonas vaginitis only. We will start IV Zosyn per ID recommendations if she undergoes surgery. Subjective: Follow-up examination of patient at the bedside. Still no gas or bowel movement. Awaiting surgical intervention. Continues to feel weak with abdominal pain. Assessment/Plan:   Active Problems:    SBO (small bowel obstruction) ([address] (6/4/2022)      1. Subacute SBO (Hx of Abd Surgery for Ovarian CA)  - NG tube   - NPO  - Surgery following- schedule exploratory lap  -Start IV Zosyn if she undergoes surgery  - IVF  - IV Pepcid  - Sx control  -Repeat CT without change     2. UTI  - UA positive for leukocyte esterase and trichomonas  - IV flagyl completed IV Levaquin  - IV fluids  - ID following  - Urine culture negative     3.  HTN  - Norvasc 5 mg     4. Ovarian CA in Remission  - Followed by Leslie Moran/Yovani for Breast Lesion  - Cancer resection in 2016 and is in remission     5.  Anxiety  - Continue Effexor      DVT Prophylaxis: Lovenox  Code Status: Full Code  POA/NOK:    Disposition and discharge barriers:    SBO resolution   Pending surgical intervention   IV TPN/IL  Care Plan discussed with: Patient, family member, staff, IDR team    Current Facility-Administered Medications   Medication Dose Route Frequency    TPN ADULT-PERIPHERAL AA 4.25% D5% + ELECTROLYTES   IntraVENous CONTINUOUS    TPN ADULT-PERIPHERAL AA 4.25% D5% + ELECTROLYTES   IntraVENous CONTINUOUS    metoclopramide HCl (REGLAN) injection 10 mg  10 mg IntraVENous Q6H    hydrALAZINE (APRESOLINE) 20 mg/mL injection 10 mg  10 mg IntraVENous Q4H PRN    benzocaine-menthoL (CEPACOL) lozenge 1 Lozenge  1 Lozenge Mucous Membrane PRN    metroNIDAZOLE (FLAGYL) IVPB premix 500 mg  500 mg IntraVENous Q12H    sodium chloride (NS) flush 5-40 mL  5-40 mL IntraVENous Q8H    sodium chloride (NS) flush 5-40 mL  5-40 mL IntraVENous PRN    acetaminophen (TYLENOL) tablet 650 mg  650 mg Oral Q6H PRN    Or    acetaminophen (TYLENOL) suppository 650 mg  650 mg Rectal Q6H PRN    polyethylene glycol (MIRALAX) packet 17 g  17 g Oral DAILY PRN    ondansetron (ZOFRAN ODT) tablet 4 mg  4 mg Oral Q8H PRN    Or    ondansetron (ZOFRAN) injection 4 mg  4 mg IntraVENous Q6H PRN    enoxaparin (LOVENOX) injection 40 mg  40 mg SubCUTAneous DAILY    lactated Ringers infusion  100 mL/hr IntraVENous CONTINUOUS    amLODIPine (NORVASC) tablet 5 mg  5 mg Oral DAILY    gabapentin (NEURONTIN) capsule 100 mg  100 mg Oral DAILY    venlafaxine-SR (EFFEXOR-XR) capsule 37.5 mg  37.5 mg Oral DAILY    famotidine (PF) (PEPCID) 20 mg in 0.9% sodium chloride 10 mL injection  20 mg IntraVENous Q12H    HYDROmorphone (DILAUDID) injection 1 mg  1 mg IntraVENous Q4H PRN        REVIEW OF SYSTEMS    Review of Systems Constitutional: Positive for weight loss. Respiratory: Negative for cough. Cardiovascular: Negative for chest pain. Gastrointestinal: Positive for abdominal pain. Genitourinary: Positive for dysuria. Musculoskeletal: Positive for myalgias. Psychiatric/Behavioral: The patient is nervous/anxious. Objective:     Visit Vitals  BP (!) 158/101   Pulse 72   Temp 98.2 °F (36.8 °C)   Resp 16   Ht 5' 7\" (1.702 m)   Wt 64.9 kg (143 lb 1.3 oz)   SpO2 100%   BMI 22.41 kg/m²      O2 Device: None (Room air)    Temp (24hrs), Av °F (36.7 °C), Min:97.7 °F (36.5 °C), Max:98.2 °F (36.8 °C)      No intake/output data recorded. No intake/output data recorded. PHYSICAL EXAM:    Physical Exam  Constitutional:       Appearance: She is ill-appearing. HENT:      Nose: No congestion. Cardiovascular:      Rate and Rhythm: Regular rhythm. Bradycardia present. Pulmonary:      Effort: No respiratory distress. Abdominal:      General: There is distension. Tenderness: There is abdominal tenderness. Musculoskeletal:         General: Normal range of motion. Skin:     General: Skin is warm. Data Review    Recent Results (from the past 24 hour(s))   CBC WITH AUTOMATED DIFF    Collection Time: 22  7:35 AM   Result Value Ref Range    WBC 7.0 3.6 - 11.0 K/uL    RBC 3.95 3.80 - 5.20 M/uL    HGB 13.0 11.5 - 16.0 g/dL    HCT 37.3 35.0 - 47.0 %    MCV 94.4 80.0 - 99.0 FL    MCH 32.9 26.0 - 34.0 PG    MCHC 34.9 30.0 - 36.5 g/dL    RDW 11.6 11.5 - 14.5 %    PLATELET 831 378 - 526 K/uL    MPV 11.9 8.9 - 12.9 FL    NRBC 0.0 0.0  WBC    ABSOLUTE NRBC 0.00 0.00 - 0.01 K/uL    NEUTROPHILS 66 32 - 75 %    LYMPHOCYTES 22 12 - 49 %    MONOCYTES 11 5 - 13 %    EOSINOPHILS 1 0 - 7 %    BASOPHILS 0 0 - 1 %    IMMATURE GRANULOCYTES 0 0 - 0.5 %    ABS. NEUTROPHILS 4.5 1.8 - 8.0 K/UL    ABS. LYMPHOCYTES 1.6 0.8 - 3.5 K/UL    ABS. MONOCYTES 0.8 0.0 - 1.0 K/UL    ABS. EOSINOPHILS 0.1 0.0 - 0.4 K/UL    ABS. BASOPHILS 0.0 0.0 - 0.1 K/UL    ABS. IMM. GRANS. 0.0 0.00 - 0.04 K/UL    DF AUTOMATED     METABOLIC PANEL, BASIC    Collection Time: 06/11/22  7:41 AM   Result Value Ref Range    Sodium 138 136 - 145 mmol/L    Potassium 3.9 3.5 - 5.1 mmol/L    Chloride 105 97 - 108 mmol/L    CO2 25 21 - 32 mmol/L    Anion gap 8 5 - 15 mmol/L    Glucose 176 (H) 65 - 100 mg/dL    BUN 5 (L) 6 - 20 mg/dL    Creatinine 0.45 (L) 0.55 - 1.02 mg/dL    BUN/Creatinine ratio 11 (L) 12 - 20      GFR est AA >60 >60 ml/min/1.73m2    GFR est non-AA >60 >60 ml/min/1.73m2    Calcium 9.0 8.5 - 10.1 mg/dL       XR WRIST RT AP/LAT   Final Result      XR HIP RT W OR WO PELV 2-3 VWS   Final Result   No fracture or destructive lesion is seen. The joint spaces are   maintained, as are the adjacent soft tissues. XR HAND RT MIN 3 V   Final Result   No fracture or destructive lesion is seen. The joint spaces are   maintained, as are the adjacent soft tissues. CT ABD PELV WO CONT   Final Result   Redemonstration of the distended small bowel loops. .         XR ABD FLAT/ ERECT   Final Result   NG tube position as above. Early/partial small bowel obstruction. CT ABD PELV W CONT   Final Result   Early/partial small bowel obstruction. _____________________________________________________________________________  Time spent in direct care including coordination of service, review of data and examination: > 35 minutes    ______________________________________________________________________________    Maryfrlinda Grewal NP    This is dictation was done by dragon, computer voice recognition software. Quite often unanticipated grammatical, syntax, homophones and other interpretive errors or inadvertently transcribed by the computer software. Please excuse errors that have escaped final proofreading. Thank you.

## 2022-06-11 NOTE — PROGRESS NOTES
Progress Note    Patient: Al Calix MRN: [id number]  SSN: [id number]    YOB: 1970  Age: 45 y.o. Sex: female      Admit Date: 6/4/2022    LOS: 7 days     Subjective: The patient is a 50-year-old female with a past history significant for ovarian cancer status post hysterectomy and nephrectomy who was admitted for partial small bowel obstruction. She has had a nasogastric tube since admission which was on June 4, 2022. Patient states that she is currently passing gas. She started today. Feels better. Objective:     Vitals:    06/11/22 0032 06/11/22 1001 06/11/22 1050 06/11/22 1622   BP: 135/81 (!) 152/100 (!) 158/101 (!) 153/96   Pulse: 76 72 72 92   Resp: 16 16     Temp: 98.2 °F (36.8 °C) 98.2 °F (36.8 °C)     SpO2: 98% 100%     Weight:       Height:            Intake and Output:  Current Shift: No intake/output data recorded. Last three shifts: No intake/output data recorded. Review of Systems:  ROS     Physical Exam:   Abdomen soft, nontender, nondistended. Lab/Data Review:  Recent Results (from the past 24 hour(s))   CBC WITH AUTOMATED DIFF    Collection Time: 06/11/22  7:35 AM   Result Value Ref Range    WBC 7.0 3.6 - 11.0 K/uL    RBC 3.95 3.80 - 5.20 M/uL    HGB 13.0 11.5 - 16.0 g/dL    HCT 37.3 35.0 - 47.0 %    MCV 94.4 80.0 - 99.0 FL    MCH 32.9 26.0 - 34.0 PG    MCHC 34.9 30.0 - 36.5 g/dL    RDW 11.6 11.5 - 14.5 %    PLATELET 340 242 - 193 K/uL    MPV 11.9 8.9 - 12.9 FL    NRBC 0.0 0.0  WBC    ABSOLUTE NRBC 0.00 0.00 - 0.01 K/uL    NEUTROPHILS 66 32 - 75 %    LYMPHOCYTES 22 12 - 49 %    MONOCYTES 11 5 - 13 %    EOSINOPHILS 1 0 - 7 %    BASOPHILS 0 0 - 1 %    IMMATURE GRANULOCYTES 0 0 - 0.5 %    ABS. NEUTROPHILS 4.5 1.8 - 8.0 K/UL    ABS. LYMPHOCYTES 1.6 0.8 - 3.5 K/UL    ABS. MONOCYTES 0.8 0.0 - 1.0 K/UL    ABS. EOSINOPHILS 0.1 0.0 - 0.4 K/UL    ABS. BASOPHILS 0.0 0.0 - 0.1 K/UL    ABS. IMM.  GRANS. 0.0 0.00 - 0.04 K/UL    DF AUTOMATED     METABOLIC PANEL, BASIC    Collection Time: 06/11/22  7:41 AM   Result Value Ref Range    Sodium 138 136 - 145 mmol/L    Potassium 3.9 3.5 - 5.1 mmol/L    Chloride 105 97 - 108 mmol/L    CO2 25 21 - 32 mmol/L    Anion gap 8 5 - 15 mmol/L    Glucose 176 (H) 65 - 100 mg/dL    BUN 5 (L) 6 - 20 mg/dL    Creatinine 0.45 (L) 0.55 - 1.02 mg/dL    BUN/Creatinine ratio 11 (L) 12 - 20      GFR est AA >60 >60 ml/min/1.73m2    GFR est non-AA >60 >60 ml/min/1.73m2    Calcium 9.0 8.5 - 10.1 mg/dL   GLUCOSE, POC    Collection Time: 06/11/22  4:21 PM   Result Value Ref Range    Glucose (POC) 96 65 - 117 mg/dL    Performed by Victor Manuel Abraham           Assessment:     Active Problems:    SBO (small bowel obstruction) ([address] (6/4/2022)        Plan:   Continue NG tube, CT scan abdomen pelvis with IV and oral contrast tomorrow morning. We will put Gastrografin through nasogastric tube  Hopefully this will show resolution of the small bowel obstruction as she is now passing gas and reports she feels better.     Signed By: Della Xiao MD     June 11, 2022

## 2022-06-11 NOTE — PROGRESS NOTES
Bedside and Verbal shift change report given to Kevan Mario RN (oncoming nurse) by Herbert Arora RN (offgoing nurse). Report included the following information SBAR, Kardex, Intake/Output, MAR, Med Rec Status and Cardiac Rhythm NSR.

## 2022-06-11 NOTE — PROGRESS NOTES
Hospitalist Progress Note            Daily Progress Note: 6/11/2022 2:13 PM  Hospital course:     Carie Hernandez is a 45year-old female with a PMHx significant for ovarian cancer s/p hysterectomy & oophorectomy and hypertension who is presenting to the ED for persistent nausea and vomiting. She was recently seen at [facility] on 06/01 where she initially presented with n/v/abd pain. She was given Bactrim for UTI and PPI. Returned today for obstipation, n/v, abd pain. She does report bowel movement on 06/03. She is being followed by [facility] for Breast Lesion. Cancer resection in 2016 and is in remission now. In the ED, hemodynamically stable. No BRBPR or hematemesis. CBC and CMP unremarkable other than slightly elevated calcium level. CT abd/pelvis showing early/partial SBO. General surgery consult. NG tube placed. Urinalysis from 06/01 positive for leukocyte esterase and trichomonas. Started on IV Flagyl and Levaquin. ID consult. Repeat CT of chest and abdomen without change. After 6 days with no improvement patient has consented for surgical intervention. PICC line placed to initiate TPN/IL. Subjective: Follow-up examination of patient at the bedside. Still no gas or bowel movement. Spoke with the surgeon and would like to now have surgical intervention. Continues to feel weak with abdominal pain. Assessment/Plan:   Active Problems:    SBO (small bowel obstruction) ([address] (6/4/2022)      1. Subacute SBO (Hx of Abd Surgery for Ovarian CA)  - NG tube   - NPO  - Surgery following-if no improvement in the next day or so will schedule exploratory lap  - IVF  - IV Pepcid  - Sx control  -Repeat CT without change     2. UTI  - UA positive for leukocyte esterase and trichomonas  - IV flagyl and levaquin  - IV fluids  - ID following  - Urine culture pending     3. HTN  - Norvasc 5 mg     4.  Ovarian CA in Remission  - Followed by Ol[facility] Sheldon for Breast Lesion  - Cancer resection in 2016 and is in remission     5. Anxiety  - Continue Effexor      DVT Prophylaxis: Lovenox  Code Status: Full Code  POA/NOK:    Disposition and discharge barriers:    SBO resolution   UTI, cultures pending  Care Plan discussed with: Patient, family member, staff, IDR team    Current Facility-Administered Medications   Medication Dose Route Frequency    TPN ADULT-PERIPHERAL AA 4.25% D5% + ELECTROLYTES   IntraVENous CONTINUOUS    TPN ADULT-PERIPHERAL AA 4.25% D5% + ELECTROLYTES   IntraVENous CONTINUOUS    metoclopramide HCl (REGLAN) injection 10 mg  10 mg IntraVENous Q6H    hydrALAZINE (APRESOLINE) 20 mg/mL injection 10 mg  10 mg IntraVENous Q4H PRN    benzocaine-menthoL (CEPACOL) lozenge 1 Lozenge  1 Lozenge Mucous Membrane PRN    metroNIDAZOLE (FLAGYL) IVPB premix 500 mg  500 mg IntraVENous Q12H    sodium chloride (NS) flush 5-40 mL  5-40 mL IntraVENous Q8H    sodium chloride (NS) flush 5-40 mL  5-40 mL IntraVENous PRN    acetaminophen (TYLENOL) tablet 650 mg  650 mg Oral Q6H PRN    Or    acetaminophen (TYLENOL) suppository 650 mg  650 mg Rectal Q6H PRN    polyethylene glycol (MIRALAX) packet 17 g  17 g Oral DAILY PRN    ondansetron (ZOFRAN ODT) tablet 4 mg  4 mg Oral Q8H PRN    Or    ondansetron (ZOFRAN) injection 4 mg  4 mg IntraVENous Q6H PRN    enoxaparin (LOVENOX) injection 40 mg  40 mg SubCUTAneous DAILY    lactated Ringers infusion  100 mL/hr IntraVENous CONTINUOUS    amLODIPine (NORVASC) tablet 5 mg  5 mg Oral DAILY    gabapentin (NEURONTIN) capsule 100 mg  100 mg Oral DAILY    venlafaxine-SR (EFFEXOR-XR) capsule 37.5 mg  37.5 mg Oral DAILY    famotidine (PF) (PEPCID) 20 mg in 0.9% sodium chloride 10 mL injection  20 mg IntraVENous Q12H    HYDROmorphone (DILAUDID) injection 1 mg  1 mg IntraVENous Q4H PRN        REVIEW OF SYSTEMS    Review of Systems   Constitutional: Positive for weight loss. Respiratory: Negative for cough. Cardiovascular: Negative for chest pain. Gastrointestinal: Positive for abdominal pain. Genitourinary: Positive for dysuria. Musculoskeletal: Positive for myalgias. Psychiatric/Behavioral: The patient is nervous/anxious. Objective:     Visit Vitals  BP (!) 158/101   Pulse 72   Temp 98.2 °F (36.8 °C)   Resp 16   Ht 5' 7\" (1.702 m)   Wt 64.9 kg (143 lb 1.3 oz)   SpO2 100%   BMI 22.41 kg/m²      O2 Device: None (Room air)    Temp (24hrs), Av °F (36.7 °C), Min:97.7 °F (36.5 °C), Max:98.2 °F (36.8 °C)      No intake/output data recorded. No intake/output data recorded. PHYSICAL EXAM:    Physical Exam  Constitutional:       Appearance: She is ill-appearing. HENT:      Nose: No congestion. Cardiovascular:      Rate and Rhythm: Normal rate and regular rhythm. Pulmonary:      Effort: No respiratory distress. Abdominal:      General: There is distension. Tenderness: There is abdominal tenderness. Musculoskeletal:         General: Normal range of motion. Skin:     General: Skin is warm. Neurological:      Motor: Weakness present. Data Review    Recent Results (from the past 24 hour(s))   CBC WITH AUTOMATED DIFF    Collection Time: 22  7:35 AM   Result Value Ref Range    WBC 7.0 3.6 - 11.0 K/uL    RBC 3.95 3.80 - 5.20 M/uL    HGB 13.0 11.5 - 16.0 g/dL    HCT 37.3 35.0 - 47.0 %    MCV 94.4 80.0 - 99.0 FL    MCH 32.9 26.0 - 34.0 PG    MCHC 34.9 30.0 - 36.5 g/dL    RDW 11.6 11.5 - 14.5 %    PLATELET 422 953 - 601 K/uL    MPV 11.9 8.9 - 12.9 FL    NRBC 0.0 0.0  WBC    ABSOLUTE NRBC 0.00 0.00 - 0.01 K/uL    NEUTROPHILS 66 32 - 75 %    LYMPHOCYTES 22 12 - 49 %    MONOCYTES 11 5 - 13 %    EOSINOPHILS 1 0 - 7 %    BASOPHILS 0 0 - 1 %    IMMATURE GRANULOCYTES 0 0 - 0.5 %    ABS. NEUTROPHILS 4.5 1.8 - 8.0 K/UL    ABS. LYMPHOCYTES 1.6 0.8 - 3.5 K/UL    ABS. MONOCYTES 0.8 0.0 - 1.0 K/UL    ABS. EOSINOPHILS 0.1 0.0 - 0.4 K/UL    ABS. BASOPHILS 0.0 0.0 - 0.1 K/UL    ABS. IMM.  GRANS. 0.0 0.00 - 0.04 K/UL    DF AUTOMATED     METABOLIC PANEL, BASIC    Collection Time: 06/11/22  7:41 AM   Result Value Ref Range    Sodium 138 136 - 145 mmol/L    Potassium 3.9 3.5 - 5.1 mmol/L    Chloride 105 97 - 108 mmol/L    CO2 25 21 - 32 mmol/L    Anion gap 8 5 - 15 mmol/L    Glucose 176 (H) 65 - 100 mg/dL    BUN 5 (L) 6 - 20 mg/dL    Creatinine 0.45 (L) 0.55 - 1.02 mg/dL    BUN/Creatinine ratio 11 (L) 12 - 20      GFR est AA >60 >60 ml/min/1.73m2    GFR est non-AA >60 >60 ml/min/1.73m2    Calcium 9.0 8.5 - 10.1 mg/dL       XR WRIST RT AP/LAT   Final Result      XR HIP RT W OR WO PELV 2-3 VWS   Final Result   No fracture or destructive lesion is seen. The joint spaces are   maintained, as are the adjacent soft tissues. XR HAND RT MIN 3 V   Final Result   No fracture or destructive lesion is seen. The joint spaces are   maintained, as are the adjacent soft tissues. CT ABD PELV WO CONT   Final Result   Redemonstration of the distended small bowel loops. .         XR ABD FLAT/ ERECT   Final Result   NG tube position as above. Early/partial small bowel obstruction. CT ABD PELV W CONT   Final Result   Early/partial small bowel obstruction. _____________________________________________________________________________  Time spent in direct care including coordination of service, review of data and examination: > 35 minutes    ______________________________________________________________________________    Stanley Hernandez NP    This is dictation was done by [employer], computer voice recognition software. Quite often unanticipated grammatical, syntax, homophones and other interpretive errors or inadvertently transcribed by the computer software. Please excuse errors that have escaped final proofreading. Thank you.

## 2022-06-12 ENCOUNTER — APPOINTMENT (OUTPATIENT)
Dept: CT IMAGING | Age: 52
DRG: 247 | End: 2022-06-12
Attending: COLON & RECTAL SURGERY
Payer: MEDICAID

## 2022-06-12 VITALS
HEIGHT: 67 IN | HEART RATE: 71 BPM | OXYGEN SATURATION: 95 % | TEMPERATURE: 98.2 F | RESPIRATION RATE: 18 BRPM | WEIGHT: 138.01 LBS | DIASTOLIC BLOOD PRESSURE: 88 MMHG | BODY MASS INDEX: 21.66 KG/M2 | SYSTOLIC BLOOD PRESSURE: 138 MMHG

## 2022-06-12 LAB
GLUCOSE BLD STRIP.AUTO-MCNC: 108 MG/DL (ref 65–117)
GLUCOSE BLD STRIP.AUTO-MCNC: 99 MG/DL (ref 65–117)
PERFORMED BY, TECHID: NORMAL
PERFORMED BY, TECHID: NORMAL

## 2022-06-12 PROCEDURE — 74011250636 HC RX REV CODE- 250/636: Performed by: INTERNAL MEDICINE

## 2022-06-12 PROCEDURE — 74011000636 HC RX REV CODE- 636: Performed by: INTERNAL MEDICINE

## 2022-06-12 PROCEDURE — 82962 GLUCOSE BLOOD TEST: CPT

## 2022-06-12 PROCEDURE — 74177 CT ABD & PELVIS W/CONTRAST: CPT

## 2022-06-12 PROCEDURE — 74011250636 HC RX REV CODE- 250/636: Performed by: SURGERY

## 2022-06-12 PROCEDURE — 74011250636 HC RX REV CODE- 250/636: Performed by: NURSE PRACTITIONER

## 2022-06-12 PROCEDURE — 74011000250 HC RX REV CODE- 250: Performed by: INTERNAL MEDICINE

## 2022-06-12 RX ADMIN — METOCLOPRAMIDE HYDROCHLORIDE 10 MG: 5 INJECTION INTRAMUSCULAR; INTRAVENOUS at 05:46

## 2022-06-12 RX ADMIN — HYDROMORPHONE HYDROCHLORIDE 1 MG: 1 INJECTION, SOLUTION INTRAMUSCULAR; INTRAVENOUS; SUBCUTANEOUS at 08:41

## 2022-06-12 RX ADMIN — SODIUM CHLORIDE, POTASSIUM CHLORIDE, SODIUM LACTATE AND CALCIUM CHLORIDE 100 ML/HR: 600; 310; 30; 20 INJECTION, SOLUTION INTRAVENOUS at 01:37

## 2022-06-12 RX ADMIN — SODIUM CHLORIDE, PRESERVATIVE FREE 20 MG: 5 INJECTION INTRAVENOUS at 08:41

## 2022-06-12 RX ADMIN — SODIUM CHLORIDE, PRESERVATIVE FREE 10 ML: 5 INJECTION INTRAVENOUS at 08:46

## 2022-06-12 RX ADMIN — METOCLOPRAMIDE HYDROCHLORIDE 10 MG: 5 INJECTION INTRAMUSCULAR; INTRAVENOUS at 12:08

## 2022-06-12 RX ADMIN — METOCLOPRAMIDE HYDROCHLORIDE 10 MG: 5 INJECTION INTRAMUSCULAR; INTRAVENOUS at 00:04

## 2022-06-12 RX ADMIN — DIATRIZOATE MEGLUMINE AND DIATRIZOATE SODIUM 30 ML: 660; 100 LIQUID ORAL; RECTAL at 10:50

## 2022-06-12 RX ADMIN — SODIUM CHLORIDE, PRESERVATIVE FREE 10 ML: 5 INJECTION INTRAVENOUS at 13:15

## 2022-06-12 RX ADMIN — IOPAMIDOL 100 ML: 755 INJECTION, SOLUTION INTRAVENOUS at 12:29

## 2022-06-12 RX ADMIN — ENOXAPARIN SODIUM 40 MG: 100 INJECTION SUBCUTANEOUS at 08:41

## 2022-06-12 RX ADMIN — HYDROMORPHONE HYDROCHLORIDE 1 MG: 1 INJECTION, SOLUTION INTRAMUSCULAR; INTRAVENOUS; SUBCUTANEOUS at 03:59

## 2022-06-12 NOTE — PROGRESS NOTES
Pt tolerated lunch well without increased abd.  Pain or nausea/vomiting;  Discharged instructions given and explained to pt  to include medications and follow up appt's   Verbalized understanding;

## 2022-06-12 NOTE — DISCHARGE SUMMARY
Hospitalist Discharge Summary     Patient ID:    Montserrat Elder  [id number]  45 y.o.  1970    Admit date: 6/4/2022    Discharge date : 6/12/2022      Final Diagnoses: Active Problems:    SBO (small bowel obstruction) ([facility] [address] (6/4/2022)        Reason for Hospitalization/Hospital Course:   Carie Hernandez is a 45year-old female with a PMHx significant for ovarian cancer s/p hysterectomy & oophorectomy and hypertension who is presenting to the ED for persistent nausea and vomiting. She was recently seen at [facility] on 06/01 where she initially presented with n/v/abd pain. She was given Bactrim for UTI and PPI. Returned today for obstipation, n/v, abd pain. She does report bowel movement on 06/03. She is being followed by [facility] for Breast Lesion. Cancer resection in 2016 and is in remission now. In the ED, hemodynamically stable. No BRBPR or hematemesis. CBC and CMP unremarkable other than slightly elevated calcium level. CT abd/pelvis showing early/partial SBO. General surgery consult. NG tube placed. Urinalysis from 06/01 positive for leukocyte esterase and trichomonas. Started on IV Flagyl and Levaquin. ID consult.   6/6 repeat CT of chest and abdomen without change. After 6 days with no improvement patient has consented for surgical intervention. PICC line placed to initiate TPN/IL. Currently on metronidazole treatment for trichomonas vaginitis only. We will start IV Zosyn per ID recommendations if she undergoes surgery. 6/12 repeat CT of abdomen and pelvis read by general surgeon reveals resolution of obstruction. We will remove NG tube start a diet and discharge later this evening. Advised follow-up outpatient with GI in 2 weeks. Referral made to local PCP for routine healthcare needs.     Discharge Medications:   Current Discharge Medication List      CONTINUE these medications which have NOT CHANGED    Details   amLODIPine (NORVASC) 5 mg tablet Take 1 Tablet by mouth daily. gabapentin (NEURONTIN) 100 mg capsule Take 100 mg by mouth daily. venlafaxine-SR (EFFEXOR-XR) 37.5 mg capsule Take 37.5 mg by mouth daily. ondansetron hcl (ZOFRAN) 4 mg tablet Take 1 Tablet by mouth every eight (8) hours as needed for Nausea, Vomiting or Nausea or Vomiting. Qty: 10 Tablet, Refills: 0      omeprazole (PRILOSEC) 20 mg capsule Take 1 Capsule by mouth daily. Qty: 10 Capsule, Refills: 0      multivitamin (ONE A DAY) tablet Take 1 Tab by mouth daily. STOP taking these medications       PEG 3350-Electrolytes (GO-LYTELY) 236-22.74-6.74 -5.86 gram suspension Comments:   Reason for Stopping:         trimethoprim-sulfamethoxazole (Bactrim DS) 160-800 mg per tablet Comments:   Reason for Stopping: Follow up Care:    1. Dana Pulido MD in 1-2 weeks. Follow-up Information     Follow up With Specialties Details Why Contact Info    Katherine Mcpherson NP Nurse Practitioner  PCP referral if needed for routine healthcare needs, As needed [facility]  [phone]      Dana Pulido MD    Patient can only remember the practice name and not the physician      Leydi Magana MD Colon and Rectal Surgery In 2 weeks  [id number]  [phone]              Patient Follow Up Instructions: Activity: Activity as tolerated  Diet: GI low residual diet  Wound Care: None needed     Condition at Discharge:  Stable  __________________________________________________________________    Disposition  Home or Self Care  ____________________________________________________________________    Code Status:  Full Code  ___________________________________________________________________    Discharge Exam:  Patient seen and examined by me on discharge day. Pertinent Findings:  Gen:    Not in distress  Chest: Clear lungs  CVS:   Regular rhythm.   No edema  Abd:  Soft, not distended, not tender  Neuro:  Alert        CONSULTATIONS: ID and General Surgery    Significant Diagnostic Studies:   Recent Results (from the past 24 hour(s))   GLUCOSE, POC    Collection Time: 06/11/22  4:21 PM   Result Value Ref Range    Glucose (POC) 96 65 - 117 mg/dL    Performed by [address] [place] [address], POC    Collection Time: 06/11/22  9:29 PM   Result Value Ref Range    Glucose (POC) 110 65 - 117 mg/dL    Performed by Alejandro Patel, POC    Collection Time: 06/12/22  8:21 AM   Result Value Ref Range    Glucose (POC) 99 65 - 117 mg/dL    Performed by Jayesh Stallworth    GLUCOSE, POC    Collection Time: 06/12/22 11:44 AM   Result Value Ref Range    Glucose (POC) 108 65 - 117 mg/dL    Performed by Jayesh Stallworth      XR WRIST RT AP/LAT   Final Result      XR HIP RT W OR WO PELV 2-3 VWS   Final Result   No fracture or destructive lesion is seen. The joint spaces are   maintained, as are the adjacent soft tissues. XR HAND RT MIN 3 V   Final Result   No fracture or destructive lesion is seen. The joint spaces are   maintained, as are the adjacent soft tissues. CT ABD PELV WO CONT   Final Result   Redemonstration of the distended small bowel loops. .         XR ABD FLAT/ ERECT   Final Result   NG tube position as above. Early/partial small bowel obstruction. CT ABD PELV W CONT   Final Result   Early/partial small bowel obstruction.       CT ABD PELV W CONT    (Results Pending)       Time spent in direct and indirect care including coordination of services: Greater than 35 minutes    Signed:  Mackenzie Duong NP  6/12/2022  12:53 PM

## 2022-06-12 NOTE — DISCHARGE INSTRUCTIONS
Patient Education        Bowel Blockage (Intestinal Obstruction): Care Instructions  Your Care Instructions  A bowel blockage, also called an intestinal obstruction, can prevent gas, fluids, or solids from moving through the intestines normally. It can cause constipation and, rarely, diarrhea. You may have pain, nausea, vomiting, and cramping. Most of the time, complete blockages require a stay in the hospital and possibly surgery. But if your bowel is only partly blocked, your doctor may tell you to wait until it clears on its own and you are able to pass gas and stool. If so, there are things you can do at home to help make you feel better. If you have had surgery for a bowel blockage, there are things you can do at home to make sure you heal well. You can also make some changes to keep your bowel from becoming blocked again. Follow-up care is a key part of your treatment and safety. Be sure to make and go to all appointments, and call your doctor if you are having problems. It's also a good idea to know your test results and keep a list of the medicines you take. How can you care for yourself at home? If your doctor has told you to wait at home for a blockage to clear on its own:  · Follow your doctor's instructions. These may include eating a liquid diet to avoid complete blockage. · Be safe with medicines. Take your medicines exactly as prescribed. Call your doctor if you think you are having a problem with your medicine. · Put a heating pad set on low on your belly to relieve mild cramps and pain. To prevent another blockage  · Try to eat smaller amounts of food more often. For example, have 5 or 6 small meals throughout the day instead of 2 or 3 large meals. · Chew your food very well. Try to chew each bite about 20 times or until it is liquid. · Avoid high-fiber foods and raw fruits and vegetables with skins, husks, strings, or seeds.  These can form a ball of undigested material that can cause a blockage if a part of your bowel is scarred or narrowed. · Check with your doctor before you eat whole-grain products or use a fiber supplement such as Citrucel or Metamucil. · To help you have regular bowel movements, eat at regular times, do not strain during a bowel movement, and drink plenty of water. If you have kidney, heart, or liver disease and have to limit fluids, talk with your doctor before you increase the amount of fluids you drink. · Drink high-calorie liquid formulas if your doctor says to. Severe symptoms may make it hard for your body to take in vitamins and minerals. · Get regular exercise. It helps you digest your food better. Get at least 30 minutes of physical activity on most days of the week. Walking is a good choice. When should you call for help? Call your doctor now or seek immediate medical care if:    · You have a fever.     · You are vomiting.     · You have new or worse belly pain.     · You cannot pass stools or gas. Watch closely for changes in your health, and be sure to contact your doctor if you have any problems. Where can you learn more? Go to http://www.gray.com/  Enter B082 in the search box to learn more about \"Bowel Blockage (Intestinal Obstruction): Care Instructions. \"  Current as of: September 8, 2021               Content Version: 13.2  © 2006-2022 [employer]. Care instructions adapted under license by [employer] (which disclaims liability or warranty for this information). If you have questions about a medical condition or this instruction, always ask your healthcare professional. [place] [address] any warranty or liability for your use of this information.

## 2022-06-12 NOTE — PROGRESS NOTES
Hospitalist Progress Note            Daily Progress Note: 6/12/2022 2:13 PM  Hospital course:     Carie Hernandez is a 45year-old female with a PMHx significant for ovarian cancer s/p hysterectomy & oophorectomy and hypertension who is presenting to the ED for persistent nausea and vomiting. She was recently seen at [facility] on 06/01 where she initially presented with n/v/abd pain. She was given Bactrim for UTI and PPI. Returned today for obstipation, n/v, abd pain. She does report bowel movement on 06/03. She is being followed by [facility]ani for Breast Lesion. Cancer resection in 2016 and is in remission now. In the ED, hemodynamically stable. No BRBPR or hematemesis. CBC and CMP unremarkable other than slightly elevated calcium level. CT abd/pelvis showing early/partial SBO. General surgery consult. NG tube placed. Urinalysis from 06/01 positive for leukocyte esterase and trichomonas. Started on IV Flagyl and Levaquin. ID consult. 6/6 repeat CT of chest and abdomen without change. After 6 days with no improvement patient has consented for surgical intervention. PICC line placed to initiate TPN/IL. Currently on metronidazole treatment for trichomonas vaginitis only. We will start IV Zosyn per ID recommendations if she undergoes surgery. Subjective: Follow-up examination of patient at the bedside. Patient is starting to pass gas, no bowel movement. Repeat CT pending. Continues to feel weak with abdominal pain. Assessment/Plan:   Active Problems:    SBO (small bowel obstruction) ([address] (6/4/2022)      1. Subacute SBO (Hx of Abd Surgery for Ovarian CA)  - NG tube   - NPO  - Surgery following- schedule exploratory lap  -Start IV Zosyn if she undergoes surgery  - IVF  - IV Pepcid  - Sx control  -6/4 CT of abdomen revealing partial/early SBO    6/6 Repeat CT without change   6/12 repeat CT pending     2.  UTI  - UA positive for leukocyte esterase and trichomonas  - IV flagyl completed IV Levaquin  - IV fluids  - ID following  - Urine culture negative     3. HTN  - Norvasc 5 mg     4. Ovarian CA in Remission  - Followed by [facility]/Yovani for Breast Lesion  - Cancer resection in 2016 and is in remission     5.  Anxiety  - Continue Effexor      DVT Prophylaxis: Lovenox  Code Status: Full Code  POA/NOK:    Disposition and discharge barriers:    SBO resolution, repeat CT of abdomen today   Pending surgical intervention   IV TPN/IL  Care Plan discussed with: Patient, family member, staff, IDR team    Current Facility-Administered Medications   Medication Dose Route Frequency    TPN ADULT-PERIPHERAL AA 4.25% D5% + ELECTROLYTES   IntraVENous CONTINUOUS    glucose chewable tablet 16 g  4 Tablet Oral PRN    glucagon (GLUCAGEN) injection 1 mg  1 mg IntraMUSCular PRN    insulin lispro (HUMALOG) injection   SubCUTAneous AC&HS    TPN ADULT-PERIPHERAL AA 4.25% D5% + ELECTROLYTES   IntraVENous CONTINUOUS    metoclopramide HCl (REGLAN) injection 10 mg  10 mg IntraVENous Q6H    hydrALAZINE (APRESOLINE) 20 mg/mL injection 10 mg  10 mg IntraVENous Q4H PRN    benzocaine-menthoL (CEPACOL) lozenge 1 Lozenge  1 Lozenge Mucous Membrane PRN    sodium chloride (NS) flush 5-40 mL  5-40 mL IntraVENous Q8H    sodium chloride (NS) flush 5-40 mL  5-40 mL IntraVENous PRN    acetaminophen (TYLENOL) tablet 650 mg  650 mg Oral Q6H PRN    Or    acetaminophen (TYLENOL) suppository 650 mg  650 mg Rectal Q6H PRN    polyethylene glycol (MIRALAX) packet 17 g  17 g Oral DAILY PRN    ondansetron (ZOFRAN ODT) tablet 4 mg  4 mg Oral Q8H PRN    Or    ondansetron (ZOFRAN) injection 4 mg  4 mg IntraVENous Q6H PRN    enoxaparin (LOVENOX) injection 40 mg  40 mg SubCUTAneous DAILY    lactated Ringers infusion  100 mL/hr IntraVENous CONTINUOUS    amLODIPine (NORVASC) tablet 5 mg  5 mg Oral DAILY    gabapentin (NEURONTIN) capsule 100 mg  100 mg Oral DAILY    venlafaxine-SR (EFFEXOR-XR) capsule 37.5 mg  37.5 mg Oral DAILY    famotidine (PF) (PEPCID) 20 mg in 0.9% sodium chloride 10 mL injection  20 mg IntraVENous Q12H    HYDROmorphone (DILAUDID) injection 1 mg  1 mg IntraVENous Q4H PRN        REVIEW OF SYSTEMS    Review of Systems   Constitutional: Positive for weight loss. Respiratory: Negative for cough. Cardiovascular: Negative for chest pain. Gastrointestinal: Positive for abdominal pain. Genitourinary: Positive for dysuria. Musculoskeletal: Positive for myalgias. Psychiatric/Behavioral: The patient is nervous/anxious. Objective:     Visit Vitals  /88   Pulse 71   Temp 98.2 °F (36.8 °C)   Resp 18   Ht 5' 7\" (1.702 m)   Wt 62.6 kg (138 lb 0.1 oz)   SpO2 95%   BMI 21.62 kg/m²      O2 Device: None (Room air)    Temp (24hrs), Av.4 °F (36.9 °C), Min:98.2 °F (36.8 °C), Max:98.7 °F (37.1 °C)      No intake/output data recorded. No intake/output data recorded. PHYSICAL EXAM:    Physical Exam  Constitutional:       Appearance: She is ill-appearing. HENT:      Nose: No congestion. Cardiovascular:      Rate and Rhythm: Regular rhythm. Bradycardia present. Pulmonary:      Effort: No respiratory distress. Abdominal:      General: There is distension. Tenderness: There is abdominal tenderness. Musculoskeletal:         General: Normal range of motion. Skin:     General: Skin is warm.           Data Review    Recent Results (from the past 24 hour(s))   GLUCOSE, POC    Collection Time: 22  4:21 PM   Result Value Ref Range    Glucose (POC) 96 65 - 117 mg/dL    Performed by [address] [place] [address], POC    Collection Time: 22  9:29 PM   Result Value Ref Range    Glucose (POC) 110 65 - 117 mg/dL    Performed by Bentley Hargrove, POC    Collection Time: 22  8:21 AM   Result Value Ref Range    Glucose (POC) 99 65 - 117 mg/dL    Performed by [facility]        XR WRIST RT AP/LAT   Final Result      XR HIP RT W OR WO PELV 2-3 VWS   Final Result   No fracture or destructive lesion is seen. The joint spaces are   maintained, as are the adjacent soft tissues. XR HAND RT MIN 3 V   Final Result   No fracture or destructive lesion is seen. The joint spaces are   maintained, as are the adjacent soft tissues. CT ABD PELV WO CONT   Final Result   Redemonstration of the distended small bowel loops. .         XR ABD FLAT/ ERECT   Final Result   NG tube position as above. Early/partial small bowel obstruction. CT ABD PELV W CONT   Final Result   Early/partial small bowel obstruction. CT ABD PELV W CONT    (Results Pending)             _____________________________________________________________________________  Time spent in direct care including coordination of service, review of data and examination: > 35 minutes    ______________________________________________________________________________    Raheem Ramírez NP    This is dictation was done by [employer], computer voice recognition software. Quite often unanticipated grammatical, syntax, homophones and other interpretive errors or inadvertently transcribed by the computer software. Please excuse errors that have escaped final proofreading. Thank you.

## 2022-06-12 NOTE — PROGRESS NOTES
CT scan reviewed, contrast in colon no evidence obstruction. Recommend start diet and discharge home later today to follow-up in my office in 2 weeks.

## 2022-06-12 NOTE — PROGRESS NOTES
Report given to Phuc Vick RN oncoming nurse to include sbar, mar, kardex, recent orders and changes;

## 2022-07-19 ENCOUNTER — APPOINTMENT (OUTPATIENT)
Dept: CT IMAGING | Age: 52
DRG: 247 | End: 2022-07-19
Attending: SURGERY
Payer: MEDICAID

## 2022-07-19 ENCOUNTER — APPOINTMENT (OUTPATIENT)
Dept: CT IMAGING | Age: 52
DRG: 247 | End: 2022-07-19
Attending: STUDENT IN AN ORGANIZED HEALTH CARE EDUCATION/TRAINING PROGRAM
Payer: MEDICAID

## 2022-07-19 ENCOUNTER — HOSPITAL ENCOUNTER (INPATIENT)
Age: 52
LOS: 5 days | Discharge: HOME | DRG: 247 | End: 2022-07-24
Attending: STUDENT IN AN ORGANIZED HEALTH CARE EDUCATION/TRAINING PROGRAM | Admitting: FAMILY MEDICINE
Payer: MEDICAID

## 2022-07-19 DIAGNOSIS — K56.609 SBO (SMALL BOWEL OBSTRUCTION) (HCC): Primary | ICD-10-CM

## 2022-07-19 LAB
ALBUMIN SERPL-MCNC: 4 G/DL (ref 3.5–5)
ALBUMIN/GLOB SERPL: 1.1 {RATIO} (ref 1.1–2.2)
ALP SERPL-CCNC: 86 U/L (ref 45–117)
ALT SERPL-CCNC: 25 U/L (ref 12–78)
ANION GAP SERPL CALC-SCNC: 3 MMOL/L (ref 5–15)
APPEARANCE UR: CLEAR
AST SERPL W P-5'-P-CCNC: 21 U/L (ref 15–37)
BACTERIA URNS QL MICRO: NEGATIVE /HPF
BASOPHILS # BLD: 0 K/UL (ref 0–0.1)
BASOPHILS NFR BLD: 0 % (ref 0–1)
BILIRUB SERPL-MCNC: 0.6 MG/DL (ref 0.2–1)
BILIRUB UR QL: NEGATIVE
BUN SERPL-MCNC: 9 MG/DL (ref 6–20)
BUN/CREAT SERPL: 13 (ref 12–20)
CA-I BLD-MCNC: 10.8 MG/DL (ref 8.5–10.1)
CHLORIDE SERPL-SCNC: 109 MMOL/L (ref 97–108)
CO2 SERPL-SCNC: 26 MMOL/L (ref 21–32)
COLOR UR: ABNORMAL
COVID-19 RAPID TEST, COVR: NOT DETECTED
CREAT SERPL-MCNC: 0.7 MG/DL (ref 0.55–1.02)
DIFFERENTIAL METHOD BLD: NORMAL
EOSINOPHIL # BLD: 0.1 K/UL (ref 0–0.4)
EOSINOPHIL NFR BLD: 1 % (ref 0–7)
ERYTHROCYTE [DISTWIDTH] IN BLOOD BY AUTOMATED COUNT: 12.7 % (ref 11.5–14.5)
GLOBULIN SER CALC-MCNC: 3.5 G/DL (ref 2–4)
GLUCOSE SERPL-MCNC: 122 MG/DL (ref 65–100)
GLUCOSE UR STRIP.AUTO-MCNC: NEGATIVE MG/DL
HCT VFR BLD AUTO: 43.6 % (ref 35–47)
HGB BLD-MCNC: 14.7 G/DL (ref 11.5–16)
HGB UR QL STRIP: NEGATIVE
IMM GRANULOCYTES # BLD AUTO: 0 K/UL (ref 0–0.04)
IMM GRANULOCYTES NFR BLD AUTO: 0 % (ref 0–0.5)
KETONES UR QL STRIP.AUTO: 5 MG/DL
LEUKOCYTE ESTERASE UR QL STRIP.AUTO: NEGATIVE
LYMPHOCYTES # BLD: 1.5 K/UL (ref 0.8–3.5)
LYMPHOCYTES NFR BLD: 27 % (ref 12–49)
MCH RBC QN AUTO: 32.4 PG (ref 26–34)
MCHC RBC AUTO-ENTMCNC: 33.7 G/DL (ref 30–36.5)
MCV RBC AUTO: 96 FL (ref 80–99)
MONOCYTES # BLD: 0.4 K/UL (ref 0–1)
MONOCYTES NFR BLD: 7 % (ref 5–13)
MRSA DNA SPEC QL NAA+PROBE: NOT DETECTED
MUCOUS THREADS URNS QL MICRO: ABNORMAL /LPF
NEUTS SEG # BLD: 3.6 K/UL (ref 1.8–8)
NEUTS SEG NFR BLD: 65 % (ref 32–75)
NITRITE UR QL STRIP.AUTO: NEGATIVE
NRBC # BLD: 0 K/UL (ref 0–0.01)
NRBC BLD-RTO: 0 PER 100 WBC
PH UR STRIP: 7 [PH] (ref 5–8)
PLATELET # BLD AUTO: 174 K/UL (ref 150–400)
PMV BLD AUTO: 11.2 FL (ref 8.9–12.9)
POTASSIUM SERPL-SCNC: 4.3 MMOL/L (ref 3.5–5.1)
PROT SERPL-MCNC: 7.5 G/DL (ref 6.4–8.2)
PROT UR STRIP-MCNC: NEGATIVE MG/DL
RBC # BLD AUTO: 4.54 M/UL (ref 3.8–5.2)
RBC #/AREA URNS HPF: ABNORMAL /HPF (ref 0–5)
SODIUM SERPL-SCNC: 138 MMOL/L (ref 136–145)
SP GR UR REFRACTOMETRY: 1.02 (ref 1–1.03)
UROBILINOGEN UR QL STRIP.AUTO: 4 EU/DL (ref 0.1–1)
WBC # BLD AUTO: 5.6 K/UL (ref 3.6–11)
WBC URNS QL MICRO: ABNORMAL /HPF (ref 0–4)

## 2022-07-19 PROCEDURE — 96374 THER/PROPH/DIAG INJ IV PUSH: CPT

## 2022-07-19 PROCEDURE — 85025 COMPLETE CBC W/AUTO DIFF WBC: CPT

## 2022-07-19 PROCEDURE — 81003 URINALYSIS AUTO W/O SCOPE: CPT

## 2022-07-19 PROCEDURE — 96375 TX/PRO/DX INJ NEW DRUG ADDON: CPT

## 2022-07-19 PROCEDURE — 74011000636 HC RX REV CODE- 636: Performed by: FAMILY MEDICINE

## 2022-07-19 PROCEDURE — 80053 COMPREHEN METABOLIC PANEL: CPT

## 2022-07-19 PROCEDURE — 74011250637 HC RX REV CODE- 250/637: Performed by: FAMILY MEDICINE

## 2022-07-19 PROCEDURE — 99285 EMERGENCY DEPT VISIT HI MDM: CPT

## 2022-07-19 PROCEDURE — 36415 COLL VENOUS BLD VENIPUNCTURE: CPT

## 2022-07-19 PROCEDURE — 74176 CT ABD & PELVIS W/O CONTRAST: CPT

## 2022-07-19 PROCEDURE — 74011250636 HC RX REV CODE- 250/636: Performed by: FAMILY MEDICINE

## 2022-07-19 PROCEDURE — 74011000250 HC RX REV CODE- 250: Performed by: STUDENT IN AN ORGANIZED HEALTH CARE EDUCATION/TRAINING PROGRAM

## 2022-07-19 PROCEDURE — 87635 SARS-COV-2 COVID-19 AMP PRB: CPT

## 2022-07-19 PROCEDURE — 87641 MR-STAPH DNA AMP PROBE: CPT

## 2022-07-19 PROCEDURE — 74011250636 HC RX REV CODE- 250/636: Performed by: SURGERY

## 2022-07-19 PROCEDURE — 65270000029 HC RM PRIVATE

## 2022-07-19 PROCEDURE — 74011250636 HC RX REV CODE- 250/636: Performed by: STUDENT IN AN ORGANIZED HEALTH CARE EDUCATION/TRAINING PROGRAM

## 2022-07-19 RX ORDER — AMLODIPINE BESYLATE 5 MG/1
5 TABLET ORAL DAILY
Status: DISCONTINUED | OUTPATIENT
Start: 2022-07-19 | End: 2022-07-24 | Stop reason: HOSPADM

## 2022-07-19 RX ORDER — VENLAFAXINE HYDROCHLORIDE 37.5 MG/1
37.5 CAPSULE, EXTENDED RELEASE ORAL DAILY
Status: DISCONTINUED | OUTPATIENT
Start: 2022-07-19 | End: 2022-07-24 | Stop reason: HOSPADM

## 2022-07-19 RX ORDER — ENOXAPARIN SODIUM 100 MG/ML
40 INJECTION SUBCUTANEOUS DAILY
Status: DISCONTINUED | OUTPATIENT
Start: 2022-07-19 | End: 2022-07-24 | Stop reason: HOSPADM

## 2022-07-19 RX ORDER — ACETAMINOPHEN 650 MG/1
650 SUPPOSITORY RECTAL
Status: DISCONTINUED | OUTPATIENT
Start: 2022-07-19 | End: 2022-07-24 | Stop reason: HOSPADM

## 2022-07-19 RX ORDER — SODIUM CHLORIDE 0.9 % (FLUSH) 0.9 %
5-40 SYRINGE (ML) INJECTION EVERY 8 HOURS
Status: DISCONTINUED | OUTPATIENT
Start: 2022-07-19 | End: 2022-07-19

## 2022-07-19 RX ORDER — ONDANSETRON 4 MG/1
4 TABLET, ORALLY DISINTEGRATING ORAL
Status: DISCONTINUED | OUTPATIENT
Start: 2022-07-19 | End: 2022-07-24 | Stop reason: HOSPADM

## 2022-07-19 RX ORDER — SODIUM CHLORIDE 9 MG/ML
75 INJECTION, SOLUTION INTRAVENOUS CONTINUOUS
Status: DISCONTINUED | OUTPATIENT
Start: 2022-07-19 | End: 2022-07-24 | Stop reason: HOSPADM

## 2022-07-19 RX ORDER — SODIUM CHLORIDE 0.9 % (FLUSH) 0.9 %
5-40 SYRINGE (ML) INJECTION AS NEEDED
Status: DISCONTINUED | OUTPATIENT
Start: 2022-07-19 | End: 2022-07-24 | Stop reason: HOSPADM

## 2022-07-19 RX ORDER — GABAPENTIN 100 MG/1
100 CAPSULE ORAL DAILY
Status: DISCONTINUED | OUTPATIENT
Start: 2022-07-19 | End: 2022-07-24 | Stop reason: HOSPADM

## 2022-07-19 RX ORDER — MORPHINE SULFATE 4 MG/ML
4 INJECTION INTRAVENOUS ONCE
Status: COMPLETED | OUTPATIENT
Start: 2022-07-19 | End: 2022-07-19

## 2022-07-19 RX ORDER — ACETAMINOPHEN 325 MG/1
650 TABLET ORAL
Status: DISCONTINUED | OUTPATIENT
Start: 2022-07-19 | End: 2022-07-24 | Stop reason: HOSPADM

## 2022-07-19 RX ORDER — ONDANSETRON 2 MG/ML
4 INJECTION INTRAMUSCULAR; INTRAVENOUS ONCE
Status: COMPLETED | OUTPATIENT
Start: 2022-07-19 | End: 2022-07-19

## 2022-07-19 RX ORDER — ONDANSETRON 2 MG/ML
4 INJECTION INTRAMUSCULAR; INTRAVENOUS
Status: DISCONTINUED | OUTPATIENT
Start: 2022-07-19 | End: 2022-07-24 | Stop reason: HOSPADM

## 2022-07-19 RX ORDER — HYDROMORPHONE HYDROCHLORIDE 1 MG/ML
1 INJECTION, SOLUTION INTRAMUSCULAR; INTRAVENOUS; SUBCUTANEOUS
Status: DISCONTINUED | OUTPATIENT
Start: 2022-07-19 | End: 2022-07-24

## 2022-07-19 RX ORDER — PANTOPRAZOLE SODIUM 20 MG/1
20 TABLET, DELAYED RELEASE ORAL DAILY
Status: DISCONTINUED | OUTPATIENT
Start: 2022-07-19 | End: 2022-07-24 | Stop reason: HOSPADM

## 2022-07-19 RX ORDER — POLYETHYLENE GLYCOL 3350 17 G/17G
17 POWDER, FOR SOLUTION ORAL DAILY PRN
Status: DISCONTINUED | OUTPATIENT
Start: 2022-07-19 | End: 2022-07-24 | Stop reason: HOSPADM

## 2022-07-19 RX ADMIN — HYDROMORPHONE HYDROCHLORIDE 1 MG: 1 INJECTION, SOLUTION INTRAMUSCULAR; INTRAVENOUS; SUBCUTANEOUS at 22:03

## 2022-07-19 RX ADMIN — ONDANSETRON 4 MG: 2 INJECTION INTRAMUSCULAR; INTRAVENOUS at 13:38

## 2022-07-19 RX ADMIN — MORPHINE SULFATE 4 MG: 4 INJECTION INTRAVENOUS at 05:00

## 2022-07-19 RX ADMIN — SODIUM CHLORIDE 75 ML/HR: 9 INJECTION, SOLUTION INTRAVENOUS at 11:15

## 2022-07-19 RX ADMIN — HYDROMORPHONE HYDROCHLORIDE 1 MG: 1 INJECTION, SOLUTION INTRAMUSCULAR; INTRAVENOUS; SUBCUTANEOUS at 18:33

## 2022-07-19 RX ADMIN — ACETAMINOPHEN 650 MG: 325 TABLET, FILM COATED ORAL at 13:38

## 2022-07-19 RX ADMIN — SODIUM CHLORIDE, PRESERVATIVE FREE 10 ML: 5 INJECTION INTRAVENOUS at 05:00

## 2022-07-19 RX ADMIN — DIATRIZOATE MEGLUMINE AND DIATRIZOATE SODIUM 30 ML: 660; 100 LIQUID ORAL; RECTAL at 14:31

## 2022-07-19 RX ADMIN — GABAPENTIN 100 MG: 100 CAPSULE ORAL at 11:15

## 2022-07-19 RX ADMIN — ONDANSETRON 4 MG: 2 INJECTION INTRAMUSCULAR; INTRAVENOUS at 04:59

## 2022-07-19 RX ADMIN — PANTOPRAZOLE SODIUM 20 MG: 20 TABLET, DELAYED RELEASE ORAL at 11:15

## 2022-07-19 RX ADMIN — ONDANSETRON 4 MG: 2 INJECTION INTRAMUSCULAR; INTRAVENOUS at 21:21

## 2022-07-19 RX ADMIN — AMLODIPINE BESYLATE 5 MG: 5 TABLET ORAL at 11:15

## 2022-07-19 RX ADMIN — VENLAFAXINE HYDROCHLORIDE 37.5 MG: 37.5 CAPSULE, EXTENDED RELEASE ORAL at 13:39

## 2022-07-19 RX ADMIN — SODIUM CHLORIDE 75 ML/HR: 9 INJECTION, SOLUTION INTRAVENOUS at 21:22

## 2022-07-19 NOTE — PROGRESS NOTES
Attempted to page Dr. Lord Miller in regards to patient's increasing reports of pain - no answer at this time

## 2022-07-19 NOTE — PROGRESS NOTES
Reason for Admission:  SBO                     RUR Score: 10%                    Plan for utilizing home health:  None @ this time/uses cane. PCP: First and Last name:  Per pt no PCP. Pt listed as Self Pay - stated someone gave her a form to call regarding her JOSH. Message left for ESS. Name of Practice:    Are you a current patient: Yes/No:    Approximate date of last visit:    Can you participate in a virtual visit with your PCP:                     Current Advanced Directive/Advance Care Plan: Full Code      Healthcare Decision Maker:              Primary Decision Maker: Janet Preciado - Daughter - [phone]                  Transition of Care Plan:    D/C Plan is home 7 daughter to transport. Call Rxs into [employer] on [address] upon discharge.

## 2022-07-19 NOTE — ROUTINE PROCESS
TRANSFER - OUT REPORT:    Verbal report given to brenda quintero(name) on Carie Gallo  being transferred to [facility](unit) for routine progression of care       Report consisted of patients Situation, Background, Assessment and   Recommendations(SBAR). Information from the following report(s) SBAR, ED Summary, Intake/Output, MAR and Recent Results was reviewed with the receiving nurse. Lines:   Peripheral IV 07/19/22 Anterior;Right Forearm (Active)        Opportunity for questions and clarification was provided.       Patient transported with:   Monitor  Tech

## 2022-07-19 NOTE — CONSULTS
[id number] SURGERY CONSULT          Chief Complaint: _abdominal pain x 1 day    History of Present Illness:    Ms. Jaida Spicer is a 45y.o. year old * female presented to ER with one day history of abdominal pain, nausea and vomiting. Her last BM & flatus was yesterday. She has a prior history of Exploratory laparotomy with TAHBSO for Ovarian cancer followed by Chemotherapy several years ago. Had one episode of partial SBO that got better with conservative management. .      Past Medical History:   Past Medical History:   Diagnosis Date    Hypertension     Ovarian cancer ([address]        Past Surgical History:   Past Surgical History:   Procedure Laterality Date    HX HYSTERECTOMY      HX OOPHORECTOMY          Allergy:No Known Allergies    Social History:  reports that she has been smoking. She has been smoking about 0.50 packs per day. She has never used smokeless tobacco. She reports current alcohol use. She reports current drug use. Drug: Marijuana. Family History:History reviewed. No pertinent family history.      Current Medications:  Current Facility-Administered Medications:     sodium chloride (NS) flush 5-40 mL, 5-40 mL, IntraVENous, PRN, Dora Osorio MD    pantoprazole (PROTONIX) tablet 20 mg, 20 mg, Oral, DAILY, Dora Osorio MD, 20 mg at 07/19/22 1115    amLODIPine (NORVASC) tablet 5 mg, 5 mg, Oral, DAILY, Dora Osorio MD, 5 mg at 07/19/22 1115    gabapentin (NEURONTIN) capsule 100 mg, 100 mg, Oral, DAILY, Dora Osorio MD, 100 mg at 07/19/22 1115    venlafaxine-SR (EFFEXOR-XR) capsule 37.5 mg, 37.5 mg, Oral, DAILY, Dora Osorio MD, 37.5 mg at 07/19/22 1339    0.9% sodium chloride infusion, 75 mL/hr, IntraVENous, CONTINUOUS, Dora Osorio MD, Last Rate: 75 mL/hr at 07/19/22 1115, 75 mL/hr at 07/19/22 1115    acetaminophen (TYLENOL) tablet 650 mg, 650 mg, Oral, Q6H PRN, 650 mg at 07/19/22 1338 **OR** acetaminophen (TYLENOL) suppository 650 mg, 650 mg, Rectal, Q6H PRN, Benji Santo MD    polyethylene glycol [facility]) packet 17 g, 17 g, Oral, DAILY PRN, Goyo Osorio MD    ondansetron (ZOFRAN ODT) tablet 4 mg, 4 mg, Oral, Q8H PRN **OR** ondansetron (ZOFRAN) injection 4 mg, 4 mg, IntraVENous, Q6H PRN, Dora Osorio MD, 4 mg at 07/19/22 1338    enoxaparin (LOVENOX) injection 40 mg, 40 mg, SubCUTAneous, DAILY, Dora Osorio MD    HYDROmorphone (DILAUDID) injection 1 mg, 1 mg, IntraVENous, Q3H PRN, Jayden Delgado MD, 1 mg at 07/19/22 1833     Immunization History: There is no immunization history on file for this patient. Complete    Review of Systems:     Constitutional:  no fever,  no chills,  no sweats, No weakness, No fatigue, No decreased activity. Eye: No recent visual problem, No icterus, No discharge, No double vision. Ear/Nose/Mouth/Throat: No decreased hearing, No ear pain, No nasal congestion, No sore throat. Respiratory: No shortness of breath, No cough, No sputum production, No hemoptysis, No wheezing, No cyanosis. Cardiovascular: No chest pain, No palpitations, No bradycardia, No tachycardia, No peripheral edema, No syncope. Gastrointestinal:  nausea,   vomiting, No diarrhea, No constipation, No heartburn,   abdominal pain. Genitourinary: No dysuria, No hematuria, No change in urine stream, No urethral discharge, No lesions. Hematology/Lymphatics: No bruising tendency, No bleeding tendency, No petechiae, No swollen lymph glands. Endocrine: No excessive thirst, No polyuria, No cold intolerance, No heat intolerance, No excessive hunger. Immunologic: Not immunocompromised, No recurrent fevers, No recurrent infections. Musculoskeletal: No back pain, No neck pain, No joint pain, No muscle pain, No claudication, No decreased range of motion, No trauma. Integumentary: No rash, No pruritus, No abrasions. Neurologic: Alert and oriented X4, No abnormal balance, No headache, No confusion, No numbness, No tingling.   Psychiatric: No anxiety, No depression, No matt. Physical Exam:     Vitals & Measurements: Wt Readings from Last 3 Encounters:   07/19/22 56.7 kg (125 lb)   06/12/22 62.6 kg (138 lb 0.1 oz)   06/01/22 61.2 kg (135 lb)     Temp Readings from Last 3 Encounters:   07/19/22 97.6 °F (36.4 °C)   06/12/22 98.2 °F (36.8 °C)   06/01/22 98 °F (36.7 °C)     BP Readings from Last 3 Encounters:   07/19/22 122/74   06/11/22 138/88   06/01/22 116/86     Pulse Readings from Last 3 Encounters:   07/19/22 (!) 52   06/12/22 71   06/01/22 86      Ht Readings from Last 3 Encounters:   07/19/22 5' 8\" (1.727 m)   06/09/22 5' 7\" (1.702 m)   06/01/22 5' 7\" (1.702 m)          General: well appearing, no acute distress  Head: Normal  Face: Nornal  HEENT: atraumatic, PERRLA, moist mucosa, normal pharynx, normal tonsils and adenoids, normal tongue, no fluid in sinuses  Neck: Trachea midline, no carotid bruit, no masses  Chest: Normal.  Respiratory: Normal chest wall expansion, CTA B, no r/r/w, no rubs  Cardiovascular: RRR, no m/r/g, Normal S1 and S2  Abdomen: Soft, non tender, non-distended, normal bowel sounds in all quadrants, no hepatosplenomegaly, no tympany. Incision scar: long midline scar. Genitourinary: No inguinal hernia, normal external gentalia, Testis & scrotum normal, no renal angle tenderness  Rectal: deferred  Musculoskeletal: normal ROM in upper and lower extremities, No joint swelling.   Integumentary: Warm, dry, and pink, with no rash, purpura, or petechia  Heme/Lymph: No lymphadenopathy, no bruises  Neurological:Cranial Nerves II-XII grossly intact, no gross motor or sensory deficit  Psychiatric: Cooperative with normal mood, affect, and cognition      Laboratory Values:   Recent Results (from the past 24 hour(s))   METABOLIC PANEL, COMPREHENSIVE    Collection Time: 07/19/22  4:45 AM   Result Value Ref Range    Sodium 138 136 - 145 mmol/L    Potassium 4.3 3.5 - 5.1 mmol/L    Chloride 109 (H) 97 - 108 mmol/L    CO2 26 21 - 32 mmol/L Anion gap 3 (L) 5 - 15 mmol/L    Glucose 122 (H) 65 - 100 mg/dL    BUN 9 6 - 20 mg/dL    Creatinine 0.70 0.55 - 1.02 mg/dL    BUN/Creatinine ratio 13 12 - 20      GFR est AA >60 >60 ml/min/1.73m2    GFR est non-AA >60 >60 ml/min/1.73m2    Calcium 10.8 (H) 8.5 - 10.1 mg/dL    Bilirubin, total 0.6 0.2 - 1.0 mg/dL    AST (SGOT) 21 15 - 37 U/L    ALT (SGPT) 25 12 - 78 U/L    Alk. phosphatase 86 45 - 117 U/L    Protein, total 7.5 6.4 - 8.2 g/dL    Albumin 4.0 3.5 - 5.0 g/dL    Globulin 3.5 2.0 - 4.0 g/dL    A-G Ratio 1.1 1.1 - 2.2     CBC WITH AUTOMATED DIFF    Collection Time: 07/19/22  4:45 AM   Result Value Ref Range    WBC 5.6 3.6 - 11.0 K/uL    RBC 4.54 3.80 - 5.20 M/uL    HGB 14.7 11.5 - 16.0 g/dL    HCT 43.6 35.0 - 47.0 %    MCV 96.0 80.0 - 99.0 FL    MCH 32.4 26.0 - 34.0 PG    MCHC 33.7 30.0 - 36.5 g/dL    RDW 12.7 11.5 - 14.5 %    PLATELET 064 566 - 079 K/uL    MPV 11.2 8.9 - 12.9 FL    NRBC 0.0 0.0  WBC    ABSOLUTE NRBC 0.00 0.00 - 0.01 K/uL    NEUTROPHILS 65 32 - 75 %    LYMPHOCYTES 27 12 - 49 %    MONOCYTES 7 5 - 13 %    EOSINOPHILS 1 0 - 7 %    BASOPHILS 0 0 - 1 %    IMMATURE GRANULOCYTES 0 0 - 0.5 %    ABS. NEUTROPHILS 3.6 1.8 - 8.0 K/UL    ABS. LYMPHOCYTES 1.5 0.8 - 3.5 K/UL    ABS. MONOCYTES 0.4 0.0 - 1.0 K/UL    ABS. EOSINOPHILS 0.1 0.0 - 0.4 K/UL    ABS. BASOPHILS 0.0 0.0 - 0.1 K/UL    ABS. IMM.  GRANS. 0.0 0.00 - 0.04 K/UL    DF AUTOMATED     URINALYSIS W/ RFLX MICROSCOPIC    Collection Time: 07/19/22  5:07 AM   Result Value Ref Range    Color Yellow/Straw      Appearance Clear Clear      Specific gravity 1.017 1.003 - 1.030      pH (UA) 7.0 5.0 - 8.0      Protein Negative Negative mg/dL    Glucose Negative Negative mg/dL    Ketone 5 (A) Negative mg/dL    Bilirubin Negative Negative      Blood Negative Negative      Urobilinogen 4.0 (H) 0.1 - 1.0 EU/dL    Nitrites Negative Negative      Leukocyte Esterase Negative Negative      WBC 0-4 0 - 4 /hpf    RBC 0-5 0 - 5 /hpf    Bacteria Negative Negative /hpf    Mucus 2+ /lpf   MRSA SCREEN - PCR (NASAL)    Collection Time: 07/19/22  8:25 AM   Result Value Ref Range    MRSA by PCR, Nasal Not Detected Not Detected     COVID-19 RAPID TEST    Collection Time: 07/19/22  8:25 AM   Result Value Ref Range    COVID-19 rapid test Not Detected Not Detected           CT ABD PELV WO CONT   Final Result   Dilated, fluid-filled small bowel loops in the upper abdomen with swirling   mesentery and vessels in the left central abdomen, concerning for small bowel   obstruction due to internal hernia. Small amount of free fluid. No free air. CT ABD PELV WO CONT    (Results Pending)     Assessment:  Problem List Items Addressed This Visit        Digestive    SBO (small bowel obstruction) ([address] - Primary           Plan:    1. Admission  2. Diet: NPO  3. IV fluids  4. SCD  5. IS  6. Pain medications  7. Antibiotics  8. Nausea medication  9. Godinez  10. NG to low continuous wall suction  11. Labs  12. CT scan of abdomen and pelvis with PO contrast tonight. Thank you for the consultation & allowing me to participate in the care of this patient.

## 2022-07-19 NOTE — ED TRIAGE NOTES
Was recently an admitted pt here for a bowel obstructions. States the last 3 days she has been having abd pain. States vomiting started at 0200. States she has had bm's and passing gas.

## 2022-07-19 NOTE — Clinical Note
Status[de-identified] INPATIENT [101]   Type of Bed: Medical [8]   Inpatient Hospitalization Certified Necessary for the Following Reasons: 9.  Other (further clarification in H&P documentation)   Admitting Diagnosis: SBO (small bowel obstruction) [facility]) [000283]   Admitting Physician: Jonathan Trevino [[id number]]   Attending Physician: Jonathan Trevino [[id number]]   Estimated Length of Stay: 3-4 Midnights   Discharge Plan[de-identified] Home with Office Follow-up

## 2022-07-19 NOTE — ED PROVIDER NOTES
[facility]  EMERGENCY DEPARTMENT ENCOUNTER NOTE        Date: 7/19/2022  Patient Name: Carla Bajwa      History of Presenting Illness     Chief Complaint   Patient presents with    Abdominal Pain       History Provided By: Patient    HPI: Carla Bajwa, 45 y.o. female with PMH of HTN and ovarian cancer status post oophorectomy and hysterectomy who was discharged from the hospital approximately a month ago after admission for SBO presenting to the ED with recurrence of abdominal pain, pain umbilical, sharp, nothing specific makes it better or worse, started 4 days ago associated with nausea and vomiting. She started vomiting tonight which brought her to come to the ED. No fever, chills or sweats. She is denying any chest pain, shortness of breath, coughing or runny nose. Patient is denying any urinary symptoms. There are no other complaints, changes, or physical findings at this time. PCP: Dana Pulido MD    Current Facility-Administered Medications   Medication Dose Route Frequency Provider Last Rate Last Admin    sodium chloride (NS) flush 5-40 mL  5-40 mL IntraVENous Q8H Chetan Workman MD   10 mL at 07/19/22 0500    sodium chloride (NS) flush 5-40 mL  5-40 mL IntraVENous PRN Chetan Wokrman MD         Current Outpatient Medications   Medication Sig Dispense Refill    amLODIPine (NORVASC) 5 mg tablet Take 1 Tablet by mouth daily.  gabapentin (NEURONTIN) 100 mg capsule Take 100 mg by mouth daily.  venlafaxine-SR (EFFEXOR-XR) 37.5 mg capsule Take 37.5 mg by mouth daily.  ondansetron hcl (ZOFRAN) 4 mg tablet Take 1 Tablet by mouth every eight (8) hours as needed for Nausea, Vomiting or Nausea or Vomiting. 10 Tablet 0    omeprazole (PRILOSEC) 20 mg capsule Take 1 Capsule by mouth daily. 10 Capsule 0    multivitamin (ONE A DAY) tablet Take 1 Tab by mouth daily.          Past History     Past Medical History:  Past Medical History:   Diagnosis Date    Hypertension     Ovarian cancer [facility])        Past Surgical History:  Past Surgical History:   Procedure Laterality Date    HX HYSTERECTOMY      HX OOPHORECTOMY         Family History:  History reviewed. No pertinent family history. Social History:  Social History     Tobacco Use    Smoking status: Current Every Day Smoker     Packs/day: 0.50    Smokeless tobacco: Never Used   Substance Use Topics    Alcohol use: Yes     Comment: occasional    Drug use: Yes     Types: Marijuana     Comment: occasionally       Allergies:  No Known Allergies      Review of Systems     Review of Systems    A 10 point review of system was performed and was negative except as noted above in HPI    Physical Exam     Physical Exam  Vitals and nursing note reviewed. Constitutional:       General: She is not in acute distress. Appearance: She is well-developed. She is not diaphoretic. HENT:      Head: Normocephalic and atraumatic. Eyes:      Extraocular Movements: Extraocular movements intact. Conjunctiva/sclera: Conjunctivae normal.   Cardiovascular:      Rate and Rhythm: Normal rate and regular rhythm. Heart sounds: Normal heart sounds. Pulmonary:      Effort: Pulmonary effort is normal.      Breath sounds: Normal breath sounds. Abdominal:      General: A surgical scar is present. Palpations: Abdomen is soft. Tenderness: There is abdominal tenderness in the periumbilical area. Musculoskeletal:      Cervical back: Neck supple. Right lower leg: No tenderness. No edema. Left lower leg: No tenderness. No edema. Neurological:      General: No focal deficit present. Mental Status: She is alert and oriented to person, place, and time.          Lab and Diagnostic Study Results     Labs -     Recent Results (from the past 12 hour(s))   METABOLIC PANEL, COMPREHENSIVE    Collection Time: 07/19/22  4:45 AM   Result Value Ref Range    Sodium 138 136 - 145 mmol/L    Potassium 4.3 3.5 - 5.1 mmol/L    Chloride 109 (H) 97 - 108 mmol/L    CO2 26 21 - 32 mmol/L    Anion gap 3 (L) 5 - 15 mmol/L    Glucose 122 (H) 65 - 100 mg/dL    BUN 9 6 - 20 mg/dL    Creatinine 0.70 0.55 - 1.02 mg/dL    BUN/Creatinine ratio 13 12 - 20      GFR est AA >60 >60 ml/min/1.73m2    GFR est non-AA >60 >60 ml/min/1.73m2    Calcium 10.8 (H) 8.5 - 10.1 mg/dL    Bilirubin, total 0.6 0.2 - 1.0 mg/dL    AST (SGOT) 21 15 - 37 U/L    ALT (SGPT) 25 12 - 78 U/L    Alk. phosphatase 86 45 - 117 U/L    Protein, total 7.5 6.4 - 8.2 g/dL    Albumin 4.0 3.5 - 5.0 g/dL    Globulin 3.5 2.0 - 4.0 g/dL    A-G Ratio 1.1 1.1 - 2.2     CBC WITH AUTOMATED DIFF    Collection Time: 07/19/22  4:45 AM   Result Value Ref Range    WBC 5.6 3.6 - 11.0 K/uL    RBC 4.54 3.80 - 5.20 M/uL    HGB 14.7 11.5 - 16.0 g/dL    HCT 43.6 35.0 - 47.0 %    MCV 96.0 80.0 - 99.0 FL    MCH 32.4 26.0 - 34.0 PG    MCHC 33.7 30.0 - 36.5 g/dL    RDW 12.7 11.5 - 14.5 %    PLATELET 066 601 - 773 K/uL    MPV 11.2 8.9 - 12.9 FL    NRBC 0.0 0.0  WBC    ABSOLUTE NRBC 0.00 0.00 - 0.01 K/uL    NEUTROPHILS 65 32 - 75 %    LYMPHOCYTES 27 12 - 49 %    MONOCYTES 7 5 - 13 %    EOSINOPHILS 1 0 - 7 %    BASOPHILS 0 0 - 1 %    IMMATURE GRANULOCYTES 0 0 - 0.5 %    ABS. NEUTROPHILS 3.6 1.8 - 8.0 K/UL    ABS. LYMPHOCYTES 1.5 0.8 - 3.5 K/UL    ABS. MONOCYTES 0.4 0.0 - 1.0 K/UL    ABS. EOSINOPHILS 0.1 0.0 - 0.4 K/UL    ABS. BASOPHILS 0.0 0.0 - 0.1 K/UL    ABS. IMM.  GRANS. 0.0 0.00 - 0.04 K/UL    DF AUTOMATED     URINALYSIS W/ RFLX MICROSCOPIC    Collection Time: 07/19/22  5:07 AM   Result Value Ref Range    Color Yellow/Straw      Appearance Clear Clear      Specific gravity 1.017 1.003 - 1.030      pH (UA) 7.0 5.0 - 8.0      Protein Negative Negative mg/dL    Glucose Negative Negative mg/dL    Ketone 5 (A) Negative mg/dL    Bilirubin Negative Negative      Blood Negative Negative      Urobilinogen 4.0 (H) 0.1 - 1.0 EU/dL    Nitrites Negative Negative      Leukocyte Esterase Negative Negative      WBC 0-4 0 - 4 /hpf    RBC 0-5 0 - 5 /hpf    Bacteria Negative Negative /hpf    Mucus 2+ /lpf       Radiologic Studies -   [unfilled]  CT Results  (Last 48 hours)               07/19/22 0516  CT ABD PELV WO CONT Final result    Impression:  Dilated, fluid-filled small bowel loops in the upper abdomen with swirling   mesentery and vessels in the left central abdomen, concerning for small bowel   obstruction due to internal hernia. Small amount of free fluid. No free air. Narrative:  INDICATION: Abd Pain       COMPARISON: June 12, 2022       TECHNIQUE:    Noncontrast thin axial images were obtained through the abdomen and pelvis. Coronal and sagittal reconstructions were generated. CT dose reduction was   achieved through use of a standardized protocol tailored for this examination   and automatic exposure control for dose modulation. FINDINGS:    LUNG BASES: No abnormality. LIVER: No mass or biliary dilatation. GALLBLADDER: Unremarkable. SPLEEN: No enlargement or lesion. PANCREAS: No mass or ductal dilatation. ADRENALS: No mass. KIDNEYS: No nephrolithiasis or hydronephrosis. GI TRACT:  Fluid-filled, dilated small bowel loops in the upper abdomen, with   swirling mesentery and vessels with bowel loops in the left midabdomen, where   there are decompressed small bowel loops distally. PERITONEUM: No free air or free fluid. APPENDIX: Not well visualized. RETROPERITONEUM: No aortic aneurysm. LYMPH NODES:  None enlarged. ADDITIONAL COMMENTS: N/A.       URINARY BLADDER: Empty. REPRODUCTIVE ORGANS: Prior hysterectomy. LYMPH NODES:  None enlarged. FREE FLUID:  Small amount. BONES: No destructive bone lesion. ADDITIONAL COMMENTS: N/A. CXR Results  (Last 48 hours)    None          Medical Decision Making and ED Course   - I am the first and primary provider for this patient AND AM THE PRIMARY PROVIDER OF RECORD.     - I reviewed the vital signs, available nursing notes, past medical history, past surgical history, family history and social history. - Initial assessment performed. The patients presenting problems have been discussed, and the staff are in agreement with the care plan formulated and outlined with them. I have encouraged them to ask questions as they arise throughout their visit. Vital Signs-Reviewed the patient's vital signs. Patient Vitals for the past 24 hrs:   Temp Pulse Resp BP SpO2   07/19/22 0515 -- (!) 56 16 (!) 122/95 100 %   07/19/22 0424 97.7 °F (36.5 °C) 62 16 113/78 100 %       Records Reviewed: Nursing Notes and Old Medical Records    Provider Notes (Medical Decision Making):         ED Course as of 07/19/22 0628   Tue Jul 19, 2022   0441 Patient with past medical history of abdominal surgeries secondary to ovarian cancer and recent SBO presented to the ED with abdominal pain, nausea and vomiting. Patient does have specific tenderness in the periumbilical area and somewhat in the left lower quadrant area. Concern for possible SBO versus diverticulitis versus UTI. We will get CBC, chemistry, urinalysis, and CT abdomen pelvis without contrast.  Symptomatic treatment with morphine and Zofran and reassess. [AA]   [id number] CT ABD PELV WO CONT  IMPRESSION  Dilated, fluid-filled small bowel loops in the upper abdomen with swirling mesentery and vessels in the left central abdomen, concerning for small bowel obstruction due to internal hernia. Small amount of free fluid. No free air. [AA]   0600 Will admit to medicine with surgical consultation. Tien Floyd      ED Course User Index  [AA] Norm King MD         Diagnosis     Clinical Impression:   1. SBO (small bowel obstruction) (HCC)          Disposition     Disposition: Condition stable  Admitted to Floor Medical Floor the case was discussed with the admitting physician     Admitted      Attestations:     Esau Platt MD    Please note that this dictation was completed with Dragon, the computer voice recognition software. Quite often unanticipated grammatical, syntax, homophones, and other interpretive errors are inadvertently transcribed by the computer software. Please disregard these errors. Please excuse any errors that have escaped final proofreading. Thank you.

## 2022-07-19 NOTE — H&P
History and Physical    NAME: Sima Arvizu   :  1970   MRN:  [id number]     Date/Time:  2022 8:23 AM    Patient PCP: Dana Pulido MD  ______________________________________________________________________             Subjective:     CHIEF COMPLAINT:         HISTORY OF PRESENT ILLNESS:       Patient is a 45y.o. year old female with a past medical history of hypertension and ovarian cancer who presents with abdominal pain for 4 days. She vomitted yesterday which prompted her to go to the ER. Her vomiting has since resolved. She reports that she is currently passing gas and had a BM yesterday. She has a surgical history of hysterectomy and oophorectomy in 2016. Dr. Geneva Medina is her oncologist and she is currently in remission for her ovarian cancer. She was recently admitted 1 month ago for a SBO and did not have operative management at that time. She denies fever, chills, headache, chest pain, shortness of breast, urinary symptoms, hematochezia. CT ABD PELV WO CONT  IMPRESSION  Dilated, fluid-filled small bowel loops in the upper abdomen with swirling  mesentery and vessels in the left central abdomen, concerning for small bowel obstruction due to internal hernia. Small amount of free fluid. No free air. Past Medical History:   Diagnosis Date    Hypertension     Ovarian cancer ([address]         Past Surgical History:   Procedure Laterality Date    HX HYSTERECTOMY      HX OOPHORECTOMY         Social History     Tobacco Use    Smoking status: Current Every Day Smoker     Packs/day: 0.50    Smokeless tobacco: Never Used   Substance Use Topics    Alcohol use: Yes     Comment: occasional        History reviewed. No pertinent family history. No Known Allergies     Prior to Admission medications    Medication Sig Start Date End Date Taking? Authorizing Provider   amLODIPine (NORVASC) 5 mg tablet Take 1 Tablet by mouth daily.  22   Dana Pulido MD   gabapentin (NEURONTIN) 100 mg capsule Take 100 mg by mouth daily. 1/19/22   Dana Pulido MD   venlafaxine-SR [facility]) 37.5 mg capsule Take 37.5 mg by mouth daily. 1/19/22   Dana Pulido MD   ondansetron hcl (ZOFRAN) 4 mg tablet Take 1 Tablet by mouth every eight (8) hours as needed for Nausea, Vomiting or Nausea or Vomiting. 6/1/22   Dilip Miles MD   omeprazole (PRILOSEC) 20 mg capsule Take 1 Capsule by mouth daily. 6/1/22   Dilip Miles MD   multivitamin (ONE A DAY) tablet Take 1 Tab by mouth daily.     Dana Pulido MD         Current Facility-Administered Medications:     sodium chloride (NS) flush 5-40 mL, 5-40 mL, IntraVENous, PRN, Goyo Osorio MD    pantoprazole (PROTONIX) tablet 20 mg, 20 mg, Oral, DAILY, Dora Osorio MD, 20 mg at 07/19/22 1115    amLODIPine (NORVASC) tablet 5 mg, 5 mg, Oral, DAILY, Benji Santo MD, 5 mg at 07/19/22 1115    gabapentin (NEURONTIN) capsule 100 mg, 100 mg, Oral, DAILY, Benji Santo MD, 100 mg at 07/19/22 1115    venlafaxine-SR (EFFEXOR-XR) capsule 37.5 mg, 37.5 mg, Oral, DAILY, Goyo Osorio MD    0.9% sodium chloride infusion, 75 mL/hr, IntraVENous, CONTINUOUS, Dora Osorio MD, Last Rate: 75 mL/hr at 07/19/22 1115, 75 mL/hr at 07/19/22 1115    acetaminophen (TYLENOL) tablet 650 mg, 650 mg, Oral, Q6H PRN **OR** acetaminophen (TYLENOL) suppository 650 mg, 650 mg, Rectal, Q6H PRN, Goyo Osorio MD    polyethylene glycol (MIRALAX) packet 17 g, 17 g, Oral, DAILY PRN, Goyo Osorio MD    ondansetron (ZOFRAN ODT) tablet 4 mg, 4 mg, Oral, Q8H PRN **OR** ondansetron (ZOFRAN) injection 4 mg, 4 mg, IntraVENous, Q6H PRN, Dora Osorio MD    enoxaparin (LOVENOX) injection 40 mg, 40 mg, SubCUTAneous, DAILY, Dora Osorio MD    LAB DATA REVIEWED:    Recent Results (from the past 24 hour(s))   METABOLIC PANEL, COMPREHENSIVE    Collection Time: 07/19/22  4:45 AM   Result Value Ref Range    Sodium 138 136 - 145 mmol/L    Potassium 4.3 3.5 - 5.1 mmol/L    Chloride 109 (H) 97 - 108 mmol/L    CO2 26 21 - 32 mmol/L    Anion gap 3 (L) 5 - 15 mmol/L    Glucose 122 (H) 65 - 100 mg/dL    BUN 9 6 - 20 mg/dL    Creatinine 0.70 0.55 - 1.02 mg/dL    BUN/Creatinine ratio 13 12 - 20      GFR est AA >60 >60 ml/min/1.73m2    GFR est non-AA >60 >60 ml/min/1.73m2    Calcium 10.8 (H) 8.5 - 10.1 mg/dL    Bilirubin, total 0.6 0.2 - 1.0 mg/dL    AST (SGOT) 21 15 - 37 U/L    ALT (SGPT) 25 12 - 78 U/L    Alk. phosphatase 86 45 - 117 U/L    Protein, total 7.5 6.4 - 8.2 g/dL    Albumin 4.0 3.5 - 5.0 g/dL    Globulin 3.5 2.0 - 4.0 g/dL    A-G Ratio 1.1 1.1 - 2.2     CBC WITH AUTOMATED DIFF    Collection Time: 07/19/22  4:45 AM   Result Value Ref Range    WBC 5.6 3.6 - 11.0 K/uL    RBC 4.54 3.80 - 5.20 M/uL    HGB 14.7 11.5 - 16.0 g/dL    HCT 43.6 35.0 - 47.0 %    MCV 96.0 80.0 - 99.0 FL    MCH 32.4 26.0 - 34.0 PG    MCHC 33.7 30.0 - 36.5 g/dL    RDW 12.7 11.5 - 14.5 %    PLATELET 645 090 - 769 K/uL    MPV 11.2 8.9 - 12.9 FL    NRBC 0.0 0.0  WBC    ABSOLUTE NRBC 0.00 0.00 - 0.01 K/uL    NEUTROPHILS 65 32 - 75 %    LYMPHOCYTES 27 12 - 49 %    MONOCYTES 7 5 - 13 %    EOSINOPHILS 1 0 - 7 %    BASOPHILS 0 0 - 1 %    IMMATURE GRANULOCYTES 0 0 - 0.5 %    ABS. NEUTROPHILS 3.6 1.8 - 8.0 K/UL    ABS. LYMPHOCYTES 1.5 0.8 - 3.5 K/UL    ABS. MONOCYTES 0.4 0.0 - 1.0 K/UL    ABS. EOSINOPHILS 0.1 0.0 - 0.4 K/UL    ABS. BASOPHILS 0.0 0.0 - 0.1 K/UL    ABS. IMM.  GRANS. 0.0 0.00 - 0.04 K/UL    DF AUTOMATED     URINALYSIS W/ RFLX MICROSCOPIC    Collection Time: 07/19/22  5:07 AM   Result Value Ref Range    Color Yellow/Straw      Appearance Clear Clear      Specific gravity 1.017 1.003 - 1.030      pH (UA) 7.0 5.0 - 8.0      Protein Negative Negative mg/dL    Glucose Negative Negative mg/dL    Ketone 5 (A) Negative mg/dL    Bilirubin Negative Negative      Blood Negative Negative      Urobilinogen 4.0 (H) 0.1 - 1.0 EU/dL    Nitrites Negative Negative      Leukocyte Esterase Negative Negative      WBC 0-4 0 - 4 /hpf RBC 0-5 0 - 5 /hpf    Bacteria Negative Negative /hpf    Mucus 2+ /lpf   MRSA SCREEN - PCR (NASAL)    Collection Time: 07/19/22  8:25 AM   Result Value Ref Range    MRSA by PCR, Nasal Not Detected Not Detected     COVID-19 RAPID TEST    Collection Time: 07/19/22  8:25 AM   Result Value Ref Range    COVID-19 rapid test Not Detected Not Detected         XR Results (most recent):  Results from Hospital Encounter encounter on 06/04/22    XR HAND RT MIN 3 V    Narrative  Three-view right hand    Impression  No fracture or destructive lesion is seen. The joint spaces are  maintained, as are the adjacent soft tissues. CT ABD PELV WO CONT   Final Result   Dilated, fluid-filled small bowel loops in the upper abdomen with swirling   mesentery and vessels in the left central abdomen, concerning for small bowel   obstruction due to internal hernia. Small amount of free fluid. No free air. CT ABD PELV WO CONT    (Results Pending)        Review of Systems:  Constitutional: Negative for chills and fever. HENT: Negative. Eyes: Negative. Respiratory: Negative. Cardiovascular: Negative. Gastrointestinal: Abdominal pain and nausea. Skin: Negative. Neurological: Negative. Objective:   VITALS:    Visit Vitals  BP (!) 131/90 (BP 1 Location: Right upper arm, BP Patient Position: At rest;Semi fowlers)   Pulse (!) 52   Temp 97.6 °F (36.4 °C)   Resp 16   Ht 5' 8\" (1.727 m)   Wt 56.7 kg (125 lb)   SpO2 100%   BMI 19.01 kg/m²       Physical Exam:   Constitutional: pt is oriented to person, place, and time. HENT:   Head: Normocephalic and atraumatic. Eyes: Pupils are equal, round, and reactive to light. EOM are normal.   Cardiovascular: Normal rate, regular rhythm and normal heart sounds. Pulmonary/Chest: Breath sounds normal. No wheezes. No rales. Exhibits no tenderness. Abdominal: Soft. Bowel sounds are normal. Abdominal tenderness periumbilical. There is no rebound and no guarding. Musculoskeletal: Normal range of motion. Neurological: pt is alert and oriented to person, place, and time. Alert. Normal strength. No cranial nerve deficit or sensory deficit. Displays a negative Romberg sign.         ASSESSMENT:  Small bowel obstruction  Hypertension  Ovarian cancer  GERD  Depression    PLAN:  IV NS  NG tube decompression  Diet NPO    Surgical consult      Current Facility-Administered Medications:     sodium chloride (NS) flush 5-40 mL, 5-40 mL, IntraVENous, PRN, Suzanna Osorio MD    pantoprazole (PROTONIX) tablet 20 mg, 20 mg, Oral, DAILY, Dora Osorio MD, 20 mg at 07/19/22 1115    amLODIPine (NORVASC) tablet 5 mg, 5 mg, Oral, DAILY, Dora Osorio MD, 5 mg at 07/19/22 1115    gabapentin (NEURONTIN) capsule 100 mg, 100 mg, Oral, DAILY, Dora Oosrio MD, 100 mg at 07/19/22 1115    venlafaxine-SR (EFFEXOR-XR) capsule 37.5 mg, 37.5 mg, Oral, DAILY, Suzanna Osorio MD    0.9% sodium chloride infusion, 75 mL/hr, IntraVENous, CONTINUOUS, Dora Osorio MD, Last Rate: 75 mL/hr at 07/19/22 1115, 75 mL/hr at 07/19/22 1115    acetaminophen (TYLENOL) tablet 650 mg, 650 mg, Oral, Q6H PRN **OR** acetaminophen (TYLENOL) suppository 650 mg, 650 mg, Rectal, Q6H PRN, Dora Osorio MD    polyethylene glycol (MIRALAX) packet 17 g, 17 g, Oral, DAILY PRN, Suzanna Osorio MD    ondansetron (ZOFRAN ODT) tablet 4 mg, 4 mg, Oral, Q8H PRN **OR** ondansetron (ZOFRAN) injection 4 mg, 4 mg, IntraVENous, Q6H PRN, Dora Osorio MD    enoxaparin (LOVENOX) injection 40 mg, 40 mg, SubCUTAneous, DAILY, Suzanna Osorio MD    Consult general surgery      ________________________________________________________________________    Signed: Jerrol Odor, MD

## 2022-07-19 NOTE — H&P
History and Physical    NAME: Rory Watson   :  1970   MRN:  [id number]     Date/Time:  2022 8:23 AM    Patient PCP: Dana Pulido MD  ______________________________________________________________________             Subjective:     CHIEF COMPLAINT:         HISTORY OF PRESENT ILLNESS:       Patient is a 45y.o. year old female with a past medical history of hypertension and ovarian cancer who presents with abdominal pain for 4 days. She vomitted yesterday which prompted her to go to the ER. Her vomiting has since resolved. She reports that she is currently passing gas and had a BM yesterday. She has a surgical history of hysterectomy and oophorectomy in 2016. Dr. Sherley Monzon is her oncologist and she is currently in remission for her ovarian cancer. She was recently admitted 1 month ago for a SBO and did not have operative management at that time. She denies fever, chills, headache, chest pain, shortness of breast, urinary symptoms, hematochezia. CT ABD PELV WO CONT  IMPRESSION  Dilated, fluid-filled small bowel loops in the upper abdomen with swirling  mesentery and vessels in the left central abdomen, concerning for small bowel obstruction due to internal hernia. Small amount of free fluid. No free air. Past Medical History:   Diagnosis Date    Hypertension     Ovarian cancer ([address]         Past Surgical History:   Procedure Laterality Date    HX HYSTERECTOMY      HX OOPHORECTOMY         Social History     Tobacco Use    Smoking status: Current Every Day Smoker     Packs/day: 0.50    Smokeless tobacco: Never Used   Substance Use Topics    Alcohol use: Yes     Comment: occasional        History reviewed. No pertinent family history. No Known Allergies     Prior to Admission medications    Medication Sig Start Date End Date Taking? Authorizing Provider   amLODIPine (NORVASC) 5 mg tablet Take 1 Tablet by mouth daily.  22   Dana Pulido MD   gabapentin (NEURONTIN) 100 mg capsule Take 100 mg by mouth daily. 1/19/22   Dana Pulido MD   venlafaxine-SR [facility]) 37.5 mg capsule Take 37.5 mg by mouth daily. 1/19/22   Dana Pulido MD   ondansetron hcl (ZOFRAN) 4 mg tablet Take 1 Tablet by mouth every eight (8) hours as needed for Nausea, Vomiting or Nausea or Vomiting. 6/1/22   Luke Alvarado MD   omeprazole (PRILOSEC) 20 mg capsule Take 1 Capsule by mouth daily. 6/1/22   Luke Alvarado MD   multivitamin (ONE A DAY) tablet Take 1 Tab by mouth daily. Ashok, MD Dana         Current Facility-Administered Medications:     sodium chloride (NS) flush 5-40 mL, 5-40 mL, IntraVENous, Q8H, Chetan Workman MD, 10 mL at 07/19/22 0500    sodium chloride (NS) flush 5-40 mL, 5-40 mL, IntraVENous, PRN, Chetan Workman MD    Current Outpatient Medications:     amLODIPine (NORVASC) 5 mg tablet, Take 1 Tablet by mouth daily. , Disp: , Rfl:     gabapentin (NEURONTIN) 100 mg capsule, Take 100 mg by mouth daily. , Disp: , Rfl:     venlafaxine-SR (EFFEXOR-XR) 37.5 mg capsule, Take 37.5 mg by mouth daily. , Disp: , Rfl:     ondansetron hcl (ZOFRAN) 4 mg tablet, Take 1 Tablet by mouth every eight (8) hours as needed for Nausea, Vomiting or Nausea or Vomiting., Disp: 10 Tablet, Rfl: 0    omeprazole (PRILOSEC) 20 mg capsule, Take 1 Capsule by mouth daily. , Disp: 10 Capsule, Rfl: 0    multivitamin (ONE A DAY) tablet, Take 1 Tab by mouth daily. , Disp: , Rfl:     LAB DATA REVIEWED:    Recent Results (from the past 24 hour(s))   METABOLIC PANEL, COMPREHENSIVE    Collection Time: 07/19/22  4:45 AM   Result Value Ref Range    Sodium 138 136 - 145 mmol/L    Potassium 4.3 3.5 - 5.1 mmol/L    Chloride 109 (H) 97 - 108 mmol/L    CO2 26 21 - 32 mmol/L    Anion gap 3 (L) 5 - 15 mmol/L    Glucose 122 (H) 65 - 100 mg/dL    BUN 9 6 - 20 mg/dL    Creatinine 0.70 0.55 - 1.02 mg/dL    BUN/Creatinine ratio 13 12 - 20      GFR est AA >60 >60 ml/min/1.73m2    GFR est non-AA >60 >60 ml/min/1.73m2    Calcium 10.8 (H) 8.5 - 10.1 mg/dL    Bilirubin, total 0.6 0.2 - 1.0 mg/dL    AST (SGOT) 21 15 - 37 U/L    ALT (SGPT) 25 12 - 78 U/L    Alk. phosphatase 86 45 - 117 U/L    Protein, total 7.5 6.4 - 8.2 g/dL    Albumin 4.0 3.5 - 5.0 g/dL    Globulin 3.5 2.0 - 4.0 g/dL    A-G Ratio 1.1 1.1 - 2.2     CBC WITH AUTOMATED DIFF    Collection Time: 07/19/22  4:45 AM   Result Value Ref Range    WBC 5.6 3.6 - 11.0 K/uL    RBC 4.54 3.80 - 5.20 M/uL    HGB 14.7 11.5 - 16.0 g/dL    HCT 43.6 35.0 - 47.0 %    MCV 96.0 80.0 - 99.0 FL    MCH 32.4 26.0 - 34.0 PG    MCHC 33.7 30.0 - 36.5 g/dL    RDW 12.7 11.5 - 14.5 %    PLATELET 428 734 - 913 K/uL    MPV 11.2 8.9 - 12.9 FL    NRBC 0.0 0.0  WBC    ABSOLUTE NRBC 0.00 0.00 - 0.01 K/uL    NEUTROPHILS 65 32 - 75 %    LYMPHOCYTES 27 12 - 49 %    MONOCYTES 7 5 - 13 %    EOSINOPHILS 1 0 - 7 %    BASOPHILS 0 0 - 1 %    IMMATURE GRANULOCYTES 0 0 - 0.5 %    ABS. NEUTROPHILS 3.6 1.8 - 8.0 K/UL    ABS. LYMPHOCYTES 1.5 0.8 - 3.5 K/UL    ABS. MONOCYTES 0.4 0.0 - 1.0 K/UL    ABS. EOSINOPHILS 0.1 0.0 - 0.4 K/UL    ABS. BASOPHILS 0.0 0.0 - 0.1 K/UL    ABS. IMM. GRANS. 0.0 0.00 - 0.04 K/UL    DF AUTOMATED     URINALYSIS W/ RFLX MICROSCOPIC    Collection Time: 07/19/22  5:07 AM   Result Value Ref Range    Color Yellow/Straw      Appearance Clear Clear      Specific gravity 1.017 1.003 - 1.030      pH (UA) 7.0 5.0 - 8.0      Protein Negative Negative mg/dL    Glucose Negative Negative mg/dL    Ketone 5 (A) Negative mg/dL    Bilirubin Negative Negative      Blood Negative Negative      Urobilinogen 4.0 (H) 0.1 - 1.0 EU/dL    Nitrites Negative Negative      Leukocyte Esterase Negative Negative      WBC 0-4 0 - 4 /hpf    RBC 0-5 0 - 5 /hpf    Bacteria Negative Negative /hpf    Mucus 2+ /lpf       XR Results (most recent):  Results from Hospital Encounter encounter on 06/04/22    XR HAND RT MIN 3 V    Narrative  Three-view right hand    Impression  No fracture or destructive lesion is seen.  The joint spaces are  maintained, as are the adjacent soft tissues. CT ABD PELV WO CONT   Final Result   Dilated, fluid-filled small bowel loops in the upper abdomen with swirling   mesentery and vessels in the left central abdomen, concerning for small bowel   obstruction due to internal hernia. Small amount of free fluid. No free air. Review of Systems:  Constitutional: Negative for chills and fever. HENT: Negative. Eyes: Negative. Respiratory: Negative. Cardiovascular: Negative. Gastrointestinal: Abdominal pain and nausea. Skin: Negative. Neurological: Negative. Objective:   VITALS:    Visit Vitals  /83   Pulse (!) 54   Temp 97.7 °F (36.5 °C)   Resp 16   Ht 5' 8\" (1.727 m)   Wt 56.7 kg (125 lb)   SpO2 100%   BMI 19.01 kg/m²       Physical Exam:   Constitutional: pt is oriented to person, place, and time. HENT:   Head: Normocephalic and atraumatic. Eyes: Pupils are equal, round, and reactive to light. EOM are normal.   Cardiovascular: Normal rate, regular rhythm and normal heart sounds. Pulmonary/Chest: Breath sounds normal. No wheezes. No rales. Exhibits no tenderness. Abdominal: Soft. Bowel sounds are normal. Abdominal tenderness periumbilical. There is no rebound and no guarding. Musculoskeletal: Normal range of motion. Neurological: pt is alert and oriented to person, place, and time. Alert. Normal strength. No cranial nerve deficit or sensory deficit. Displays a negative Romberg sign.         ASSESSMENT:  Small bowel obstruction  Hypertension  Ovarian cancer    PLAN:  IV NS  NG tube decompression  Diet NPO    Consult general surgery      ________________________________________________________________________    Signed: Abdoulaye Britton

## 2022-07-19 NOTE — CONSULTS
[id number] SURGERY CONSULT          Chief Complaint: abdominal pain x  4 days    History of Present Illness:    Jaida Spicer, 45 y.o. female with PMH of HTN and ovarian cancer status post oophorectomy and hysterectomy who was discharged from the hospital approximately a month ago after admission for SBO presenting to the ED with recurrence of abdominal pain, pain umbilical, sharp, nothing specific makes it better or worse, started 4 days ago associated with nausea and vomiting. She started vomiting tonight which brought her to come to the ED. No fever, chills or sweats. She is denying any chest pain, shortness of breath, coughing or runny nose. Patient is denying any urinary symptoms.     There are no other complaints, changes, or physical findings at this time. Past Medical History:   Past Medical History:   Diagnosis Date    Hypertension     Ovarian cancer ([address]        Past Surgical History:   Past Surgical History:   Procedure Laterality Date    HX HYSTERECTOMY      HX OOPHORECTOMY          Allergy:No Known Allergies    Social History:  reports that she has been smoking. She has been smoking about 0.50 packs per day. She has never used smokeless tobacco. She reports current alcohol use. She reports current drug use. Drug: Marijuana. Family History:History reviewed. No pertinent family history.      Current Medications:  Current Facility-Administered Medications:     sodium chloride (NS) flush 5-40 mL, 5-40 mL, IntraVENous, PRN, Dora Osorio MD    pantoprazole (PROTONIX) tablet 20 mg, 20 mg, Oral, DAILY, Dora Osorio MD, 20 mg at 07/19/22 1115    amLODIPine (NORVASC) tablet 5 mg, 5 mg, Oral, DAILY, Dora Osorio MD, 5 mg at 07/19/22 1115    gabapentin (NEURONTIN) capsule 100 mg, 100 mg, Oral, DAILY, Dora Osorio MD, 100 mg at 07/19/22 1115    venlafaxine-SR (EFFEXOR-XR) capsule 37.5 mg, 37.5 mg, Oral, DAILY, Dora Osorio MD    0.9% sodium chloride infusion, 75 mL/hr, IntraVENous, CONTINUOUS, Dora Osorio MD, Last Rate: 75 mL/hr at 07/19/22 1115, 75 mL/hr at 07/19/22 1115    acetaminophen (TYLENOL) tablet 650 mg, 650 mg, Oral, Q6H PRN **OR** acetaminophen (TYLENOL) suppository 650 mg, 650 mg, Rectal, Q6H PRN, Carmen Osorio MD    polyethylene glycol (MIRALAX) packet 17 g, 17 g, Oral, DAILY PRN, Carmen Osorio MD    ondansetron (ZOFRAN ODT) tablet 4 mg, 4 mg, Oral, Q8H PRN **OR** ondansetron (ZOFRAN) injection 4 mg, 4 mg, IntraVENous, Q6H PRN, Dora Osorio MD    enoxaparin (LOVENOX) injection 40 mg, 40 mg, SubCUTAneous, DAILY, Carmen Osorio MD     Immunization History: There is no immunization history on file for this patient. Complete    Review of Systems:     Constitutional:  no fever,  no chills,  no sweats, No weakness, No fatigue, No decreased activity. Eye: No recent visual problem, No icterus, No discharge, No double vision. Ear/Nose/Mouth/Throat: No decreased hearing, No ear pain, No nasal congestion, No sore throat. Respiratory: No shortness of breath, No cough, No sputum production, No hemoptysis, No wheezing, No cyanosis. Cardiovascular: No chest pain, No palpitations, No bradycardia, No tachycardia, No peripheral edema, No syncope. Gastrointestinal: No nausea,  No vomiting, No diarrhea, No constipation, No heartburn,  No abdominal pain. Genitourinary: No dysuria, No hematuria, No change in urine stream, No urethral discharge, No lesions. Hematology/Lymphatics: No bruising tendency, No bleeding tendency, No petechiae, No swollen lymph glands. Endocrine: No excessive thirst, No polyuria, No cold intolerance, No heat intolerance, No excessive hunger. Immunologic: Not immunocompromised, No recurrent fevers, No recurrent infections. Musculoskeletal: No back pain, No neck pain, No joint pain, No muscle pain, No claudication, No decreased range of motion, No trauma. Integumentary: No rash, No pruritus, No abrasions.   Neurologic: Alert and oriented X4, No abnormal balance, No headache, No confusion, No numbness, No tingling. Psychiatric: No anxiety, No depression, No matt. Physical Exam:     Vitals & Measurements: Wt Readings from Last 3 Encounters:   07/19/22 56.7 kg (125 lb)   06/12/22 62.6 kg (138 lb 0.1 oz)   06/01/22 61.2 kg (135 lb)     Temp Readings from Last 3 Encounters:   07/19/22 97.6 °F (36.4 °C)   06/12/22 98.2 °F (36.8 °C)   06/01/22 98 °F (36.7 °C)     BP Readings from Last 3 Encounters:   07/19/22 (!) 131/90   06/11/22 138/88   06/01/22 116/86     Pulse Readings from Last 3 Encounters:   07/19/22 (!) 52   06/12/22 71   06/01/22 86      Ht Readings from Last 3 Encounters:   07/19/22 5' 8\" (1.727 m)   06/09/22 5' 7\" (1.702 m)   06/01/22 5' 7\" (1.702 m)          General: well appearing, no acute distress  Head: Normal  Face: Nornal  HEENT: atraumatic, PERRLA, moist mucosa, normal pharynx, normal tonsils and adenoids, normal tongue, no fluid in sinuses  Neck: Trachea midline, no carotid bruit, no masses  Chest: Normal.  Respiratory: Normal chest wall expansion, CTA B, no r/r/w, no rubs  Cardiovascular: RRR, no m/r/g, Normal S1 and S2  Abdomen: Soft, non tender, non-distended, normal bowel sounds in all quadrants, no hepatosplenomegaly, no tympany. Incision scar:   Genitourinary: No inguinal hernia, normal external gentalia, Testis & scrotum normal, no renal angle tenderness  Rectal: deferred  Musculoskeletal: normal ROM in upper and lower extremities, No joint swelling.   Integumentary: Warm, dry, and pink, with no rash, purpura, or petechia  Heme/Lymph: No lymphadenopathy, no bruises  Neurological:Cranial Nerves II-XII grossly intact, no gross motor or sensory deficit  Psychiatric: Cooperative with normal mood, affect, and cognition      Laboratory Values:   Recent Results (from the past 24 hour(s))   METABOLIC PANEL, COMPREHENSIVE    Collection Time: 07/19/22  4:45 AM   Result Value Ref Range    Sodium 138 136 - 145 mmol/L Potassium 4.3 3.5 - 5.1 mmol/L    Chloride 109 (H) 97 - 108 mmol/L    CO2 26 21 - 32 mmol/L    Anion gap 3 (L) 5 - 15 mmol/L    Glucose 122 (H) 65 - 100 mg/dL    BUN 9 6 - 20 mg/dL    Creatinine 0.70 0.55 - 1.02 mg/dL    BUN/Creatinine ratio 13 12 - 20      GFR est AA >60 >60 ml/min/1.73m2    GFR est non-AA >60 >60 ml/min/1.73m2    Calcium 10.8 (H) 8.5 - 10.1 mg/dL    Bilirubin, total 0.6 0.2 - 1.0 mg/dL    AST (SGOT) 21 15 - 37 U/L    ALT (SGPT) 25 12 - 78 U/L    Alk. phosphatase 86 45 - 117 U/L    Protein, total 7.5 6.4 - 8.2 g/dL    Albumin 4.0 3.5 - 5.0 g/dL    Globulin 3.5 2.0 - 4.0 g/dL    A-G Ratio 1.1 1.1 - 2.2     CBC WITH AUTOMATED DIFF    Collection Time: 07/19/22  4:45 AM   Result Value Ref Range    WBC 5.6 3.6 - 11.0 K/uL    RBC 4.54 3.80 - 5.20 M/uL    HGB 14.7 11.5 - 16.0 g/dL    HCT 43.6 35.0 - 47.0 %    MCV 96.0 80.0 - 99.0 FL    MCH 32.4 26.0 - 34.0 PG    MCHC 33.7 30.0 - 36.5 g/dL    RDW 12.7 11.5 - 14.5 %    PLATELET 676 842 - 635 K/uL    MPV 11.2 8.9 - 12.9 FL    NRBC 0.0 0.0  WBC    ABSOLUTE NRBC 0.00 0.00 - 0.01 K/uL    NEUTROPHILS 65 32 - 75 %    LYMPHOCYTES 27 12 - 49 %    MONOCYTES 7 5 - 13 %    EOSINOPHILS 1 0 - 7 %    BASOPHILS 0 0 - 1 %    IMMATURE GRANULOCYTES 0 0 - 0.5 %    ABS. NEUTROPHILS 3.6 1.8 - 8.0 K/UL    ABS. LYMPHOCYTES 1.5 0.8 - 3.5 K/UL    ABS. MONOCYTES 0.4 0.0 - 1.0 K/UL    ABS. EOSINOPHILS 0.1 0.0 - 0.4 K/UL    ABS. BASOPHILS 0.0 0.0 - 0.1 K/UL    ABS. IMM.  GRANS. 0.0 0.00 - 0.04 K/UL    DF AUTOMATED     URINALYSIS W/ RFLX MICROSCOPIC    Collection Time: 07/19/22  5:07 AM   Result Value Ref Range    Color Yellow/Straw      Appearance Clear Clear      Specific gravity 1.017 1.003 - 1.030      pH (UA) 7.0 5.0 - 8.0      Protein Negative Negative mg/dL    Glucose Negative Negative mg/dL    Ketone 5 (A) Negative mg/dL    Bilirubin Negative Negative      Blood Negative Negative      Urobilinogen 4.0 (H) 0.1 - 1.0 EU/dL    Nitrites Negative Negative      Leukocyte Esterase Negative Negative      WBC 0-4 0 - 4 /hpf    RBC 0-5 0 - 5 /hpf    Bacteria Negative Negative /hpf    Mucus 2+ /lpf   MRSA SCREEN - PCR (NASAL)    Collection Time: 07/19/22  8:25 AM   Result Value Ref Range    MRSA by PCR, Nasal Not Detected Not Detected     COVID-19 RAPID TEST    Collection Time: 07/19/22  8:25 AM   Result Value Ref Range    COVID-19 rapid test Not Detected Not Detected             CT ABD PELV WO CONT   Final Result   Dilated, fluid-filled small bowel loops in the upper abdomen with swirling   mesentery and vessels in the left central abdomen, concerning for small bowel   obstruction due to internal hernia. Small amount of free fluid. No free air. CT ABD PELV WO CONT    (Results Pending)       ECG    Pregnancy test (female patients)     Assessment:  Problem List Items Addressed This Visit        Digestive    SBO (small bowel obstruction) ([facility] [address] - Primary           Plan:    1. Admission  2. Diet   3. IV fluids  4. SCD  5. IS  6. Pain medications  7. Antibiotics  8. Nausea medication  9. Godinez  10. NG to low continuous wall suction  11. Labs  12. Consult  13. OR  14. Procedure/Surgery:  15. Risk, benefits and complications including bleeding, infection, dvt, pe, mi, stroke, sepsis, injury to bowel, bladder, ureter, bile duct, bile leak, bowel obstruction, colostomy, evisceration, dehiscence, recurrence, discussed, questions answered, patient & family clearly understands and agrees with plan. All their questions were answered to their satisfaction. RN was present during entire conversation. Thank you for the consultation & allowing me to participate in the care of this patient.

## 2022-07-20 ENCOUNTER — APPOINTMENT (OUTPATIENT)
Dept: GENERAL RADIOLOGY | Age: 52
DRG: 247 | End: 2022-07-20
Attending: FAMILY MEDICINE
Payer: MEDICAID

## 2022-07-20 LAB
ALBUMIN SERPL-MCNC: 3.9 G/DL (ref 3.5–5)
ALBUMIN/GLOB SERPL: 1.3 {RATIO} (ref 1.1–2.2)
ALP SERPL-CCNC: 76 U/L (ref 45–117)
ALT SERPL-CCNC: 19 U/L (ref 12–78)
ANION GAP SERPL CALC-SCNC: 7 MMOL/L (ref 5–15)
AST SERPL W P-5'-P-CCNC: 14 U/L (ref 15–37)
BASOPHILS # BLD: 0 K/UL (ref 0–0.1)
BASOPHILS NFR BLD: 0 % (ref 0–1)
BILIRUB SERPL-MCNC: 0.7 MG/DL (ref 0.2–1)
BUN SERPL-MCNC: 8 MG/DL (ref 6–20)
BUN/CREAT SERPL: 14 (ref 12–20)
CA-I BLD-MCNC: 10.5 MG/DL (ref 8.5–10.1)
CHLORIDE SERPL-SCNC: 108 MMOL/L (ref 97–108)
CO2 SERPL-SCNC: 23 MMOL/L (ref 21–32)
CREAT SERPL-MCNC: 0.58 MG/DL (ref 0.55–1.02)
DIFFERENTIAL METHOD BLD: NORMAL
EOSINOPHIL # BLD: 0 K/UL (ref 0–0.4)
EOSINOPHIL NFR BLD: 0 % (ref 0–7)
ERYTHROCYTE [DISTWIDTH] IN BLOOD BY AUTOMATED COUNT: 12.7 % (ref 11.5–14.5)
GLOBULIN SER CALC-MCNC: 3.1 G/DL (ref 2–4)
GLUCOSE SERPL-MCNC: 106 MG/DL (ref 65–100)
HCT VFR BLD AUTO: 44.3 % (ref 35–47)
HGB BLD-MCNC: 14.6 G/DL (ref 11.5–16)
IMM GRANULOCYTES # BLD AUTO: 0 K/UL (ref 0–0.04)
IMM GRANULOCYTES NFR BLD AUTO: 0 % (ref 0–0.5)
LYMPHOCYTES # BLD: 1.4 K/UL (ref 0.8–3.5)
LYMPHOCYTES NFR BLD: 22 % (ref 12–49)
MCH RBC QN AUTO: 31.9 PG (ref 26–34)
MCHC RBC AUTO-ENTMCNC: 33 G/DL (ref 30–36.5)
MCV RBC AUTO: 96.9 FL (ref 80–99)
MONOCYTES # BLD: 0.6 K/UL (ref 0–1)
MONOCYTES NFR BLD: 9 % (ref 5–13)
NEUTS SEG # BLD: 4.3 K/UL (ref 1.8–8)
NEUTS SEG NFR BLD: 69 % (ref 32–75)
NRBC # BLD: 0 K/UL (ref 0–0.01)
NRBC BLD-RTO: 0 PER 100 WBC
PLATELET # BLD AUTO: 175 K/UL (ref 150–400)
PMV BLD AUTO: 11.4 FL (ref 8.9–12.9)
POTASSIUM SERPL-SCNC: 3.9 MMOL/L (ref 3.5–5.1)
PROT SERPL-MCNC: 7 G/DL (ref 6.4–8.2)
RBC # BLD AUTO: 4.57 M/UL (ref 3.8–5.2)
SODIUM SERPL-SCNC: 138 MMOL/L (ref 136–145)
WBC # BLD AUTO: 6.3 K/UL (ref 3.6–11)

## 2022-07-20 PROCEDURE — 74011250637 HC RX REV CODE- 250/637: Performed by: FAMILY MEDICINE

## 2022-07-20 PROCEDURE — 80053 COMPREHEN METABOLIC PANEL: CPT

## 2022-07-20 PROCEDURE — 74011250636 HC RX REV CODE- 250/636: Performed by: FAMILY MEDICINE

## 2022-07-20 PROCEDURE — 74018 RADEX ABDOMEN 1 VIEW: CPT

## 2022-07-20 PROCEDURE — 85025 COMPLETE CBC W/AUTO DIFF WBC: CPT

## 2022-07-20 PROCEDURE — 74011250636 HC RX REV CODE- 250/636: Performed by: SURGERY

## 2022-07-20 PROCEDURE — 65270000029 HC RM PRIVATE

## 2022-07-20 PROCEDURE — 36415 COLL VENOUS BLD VENIPUNCTURE: CPT

## 2022-07-20 RX ADMIN — SODIUM CHLORIDE 75 ML/HR: 9 INJECTION, SOLUTION INTRAVENOUS at 09:58

## 2022-07-20 RX ADMIN — AMLODIPINE BESYLATE 5 MG: 5 TABLET ORAL at 17:34

## 2022-07-20 RX ADMIN — HYDROMORPHONE HYDROCHLORIDE 1 MG: 1 INJECTION, SOLUTION INTRAMUSCULAR; INTRAVENOUS; SUBCUTANEOUS at 17:32

## 2022-07-20 RX ADMIN — HYDROMORPHONE HYDROCHLORIDE 1 MG: 1 INJECTION, SOLUTION INTRAMUSCULAR; INTRAVENOUS; SUBCUTANEOUS at 05:00

## 2022-07-20 RX ADMIN — ONDANSETRON 4 MG: 2 INJECTION INTRAMUSCULAR; INTRAVENOUS at 05:00

## 2022-07-20 RX ADMIN — HYDROMORPHONE HYDROCHLORIDE 1 MG: 1 INJECTION, SOLUTION INTRAMUSCULAR; INTRAVENOUS; SUBCUTANEOUS at 21:21

## 2022-07-20 RX ADMIN — HYDROMORPHONE HYDROCHLORIDE 1 MG: 1 INJECTION, SOLUTION INTRAMUSCULAR; INTRAVENOUS; SUBCUTANEOUS at 09:07

## 2022-07-20 RX ADMIN — GABAPENTIN 100 MG: 100 CAPSULE ORAL at 17:34

## 2022-07-20 RX ADMIN — HYDROMORPHONE HYDROCHLORIDE 1 MG: 1 INJECTION, SOLUTION INTRAMUSCULAR; INTRAVENOUS; SUBCUTANEOUS at 13:33

## 2022-07-20 RX ADMIN — ENOXAPARIN SODIUM 40 MG: 100 INJECTION SUBCUTANEOUS at 10:27

## 2022-07-20 NOTE — ROUTINE PROCESS
Bedside shift change report given to Di quintero (oncoming nurse) by Ravinder Guallpa (offgoing nurse). Report included the following information SBAR.

## 2022-07-20 NOTE — PROGRESS NOTES
Chief Complaint: None      Subjective:  Ms. Eliane Avila is a 45y.o. year old * female with history of ovarian cancer S/P TAHBSO, previously admitted from 06/04/22-06/12/22 and was discharged home after improvement with conservative management who was readmitted for abdominal pain concerning for recurrent SBO. Repeat CT abdomen/pelvis yesterday with oral contrast demonstrating partial SBO with transition point in the L mid abdomen. KUB performed today with persistent dilated small bowel with air and contrast within the colon. Patient reports improvement with her pain. No nausea or vomiting. Is passing flatus but no BM. Review of Systems:   Constitutional:  no fever, no chills,  no sweats, No weakness, No fatigue, No decreased activity. Respiratory: No shortness of breath, No cough, No sputum production, No hemoptysis, No wheezing, No cyanosis. Cardiovascular: No chest pain, No palpitations, No bradycardia, No tachycardia, No peripheral edema, No syncope. Gastrointestinal: No nausea, No vomiting, No diarrhea, No constipation, No heartburn, Abdominal pain. Genitourinary: No dysuria, No hematuria, No change in urine stream, No urethral discharge, No lesions. Hematology/Lymphatics: No bruising tendency, No bleeding tendency, No petechiae, No swollen lymph glands. Endocrine: No excessive thirst, No polyuria, No cold intolerance, No heat intolerance, No excessive hunger. Musculoskeletal: No back pain, No neck pain, No joint pain, No muscle pain, No claudication, No decreased range of motion, No trauma. Integumentary: No rash, No pruritus, No abrasions. Neurologic: Alert and oriented X4, No abnormal balance, No headache, No confusion, No numbness, No tingling. Psychiatric: No anxiety, No depression, No matt. Physical Exam:     Vitals & Measurements:     Wt Readings from Last 3 Encounters:   07/19/22 56.7 kg (125 lb)   06/12/22 62.6 kg (138 lb 0.1 oz)   06/01/22 61.2 kg (135 lb)     Temp Readings from Last 3 Encounters:   07/20/22 97.9 °F (36.6 °C)   06/12/22 98.2 °F (36.8 °C)   06/01/22 98 °F (36.7 °C)     BP Readings from Last 3 Encounters:   07/20/22 130/84   06/11/22 138/88   06/01/22 116/86     Pulse Readings from Last 3 Encounters:   07/20/22 64   06/12/22 71   06/01/22 86      Ht Readings from Last 3 Encounters:   07/19/22 5' 8\" (1.727 m)   06/09/22 5' 7\" (1.702 m)   06/01/22 5' 7\" (1.702 m)      Date 07/19/22 0700 - 07/20/22 0659 07/20/22 0700 - 07/21/22 0659   Shift 1471-8723 5904-5056 24 Hour Total 9544-9143 0891-3457 24 Hour Total   INTAKE   P.O.    0  0     P. O.    0  0   Shift Total(mL/kg)    0(0)  0(0)   OUTPUT   Urine(mL/kg/hr)           Urine Occurrence(s)  2 x 2 x 2 x  2 x   Emesis/NG output 200  200        Emesis 200  200        Emesis Occurrence(s) 1 x  1 x      Shift Total(mL/kg) 200(3.5)  200(3.5)      NET -200  -200 0  0   Weight (kg) 56.7 56.7 56.7 56.7 56.7 56.7       General: well appearing, no acute distress  Head: Normal  Face: Nornal  HEENT: atraumatic, PERRLA, moist mucosa, normal pharynx, normal tonsils and adenoids, normal tongue, no fluid in sinuses  Neck: Trachea midline, no carotid bruit, no masses  Chest: Normal.  Respiratory: normal chest wall expansion, CTA B, no r/r/w, no rubs  Cardiovascular: RRR, no m/r/g, Normal S1 and S2  Abdomen: Soft, mild periumbilical and lower abdominal tenderness, non-distended, normal bowel sounds in all quadrants, no hepatosplenomegaly, no tympany. Incision scar: well healed long midline surgical incision. Genitourinary: No inguinal hernia, normal external gentalia, no renal angle tenderness  Rectal: deferred  Musculoskeletal: Normal ROM in upper and lower extremities, No joint swelling. Integumentary: Warm, dry, and pink, with no rash, purpura, or petechia  Heme/Lymph: No lymphadenopathy, no bruises  Neurological: Cranial Nerves II-XII grossly intact, No gross sensory or motor deficit.   Psychiatric: Cooperative with normal mood, affect, and cognition    Laboratory Values:   Recent Results (from the past 24 hour(s))   METABOLIC PANEL, COMPREHENSIVE    Collection Time: 07/20/22  5:32 AM   Result Value Ref Range    Sodium 138 136 - 145 mmol/L    Potassium 3.9 3.5 - 5.1 mmol/L    Chloride 108 97 - 108 mmol/L    CO2 23 21 - 32 mmol/L    Anion gap 7 5 - 15 mmol/L    Glucose 106 (H) 65 - 100 mg/dL    BUN 8 6 - 20 mg/dL    Creatinine 0.58 0.55 - 1.02 mg/dL    BUN/Creatinine ratio 14 12 - 20      GFR est AA >60 >60 ml/min/1.73m2    GFR est non-AA >60 >60 ml/min/1.73m2    Calcium 10.5 (H) 8.5 - 10.1 mg/dL    Bilirubin, total 0.7 0.2 - 1.0 mg/dL    AST (SGOT) 14 (L) 15 - 37 U/L    ALT (SGPT) 19 12 - 78 U/L    Alk. phosphatase 76 45 - 117 U/L    Protein, total 7.0 6.4 - 8.2 g/dL    Albumin 3.9 3.5 - 5.0 g/dL    Globulin 3.1 2.0 - 4.0 g/dL    A-G Ratio 1.3 1.1 - 2.2     CBC WITH AUTOMATED DIFF    Collection Time: 07/20/22  5:32 AM   Result Value Ref Range    WBC 6.3 3.6 - 11.0 K/uL    RBC 4.57 3.80 - 5.20 M/uL    HGB 14.6 11.5 - 16.0 g/dL    HCT 44.3 35.0 - 47.0 %    MCV 96.9 80.0 - 99.0 FL    MCH 31.9 26.0 - 34.0 PG    MCHC 33.0 30.0 - 36.5 g/dL    RDW 12.7 11.5 - 14.5 %    PLATELET 452 258 - 929 K/uL    MPV 11.4 8.9 - 12.9 FL    NRBC 0.0 0.0  WBC    ABSOLUTE NRBC 0.00 0.00 - 0.01 K/uL    NEUTROPHILS 69 32 - 75 %    LYMPHOCYTES 22 12 - 49 %    MONOCYTES 9 5 - 13 %    EOSINOPHILS 0 0 - 7 %    BASOPHILS 0 0 - 1 %    IMMATURE GRANULOCYTES 0 0 - 0.5 %    ABS. NEUTROPHILS 4.3 1.8 - 8.0 K/UL    ABS. LYMPHOCYTES 1.4 0.8 - 3.5 K/UL    ABS. MONOCYTES 0.6 0.0 - 1.0 K/UL    ABS. EOSINOPHILS 0.0 0.0 - 0.4 K/UL    ABS. BASOPHILS 0.0 0.0 - 0.1 K/UL    ABS. IMM. GRANS. 0.0 0.00 - 0.04 K/UL    DF AUTOMATED         XR ABD (KUB)   Final Result   Persistently dilated small bowel in the left hemiabdomen, comparable to   yesterday's CT. CT ABD PELV WO CONT   Final Result   1.   Partial small bowel obstruction with transition point in the left mid   abdomen as above.      2.  Free fluid in the pelvis. CT ABD PELV WO CONT   Final Result   Dilated, fluid-filled small bowel loops in the upper abdomen with swirling   mesentery and vessels in the left central abdomen, concerning for small bowel   obstruction due to internal hernia. Small amount of free fluid. No free air. Assessment:  Problem List Items Addressed This Visit          Digestive    SBO (small bowel obstruction) ([address] - Primary        Plan:    Admission  Diet: clear liquids   IV fluids  SCD  IS  Pain medications  Nausea medication  NG to low continuous wall suction  Labs  Will treat conservatively at this time and continue to monitor. 11. Plan discussed with patient and family and answered all their questions. Thank you for allowing me to participate in the care of this patient.

## 2022-07-20 NOTE — PROGRESS NOTES
PROGRESS NOTE    Patient: Edmond aGo MRN: [id number]  SSN: [id number]    YOB: 1970  Age: 45 y.o. Sex: female      Admit Date: 7/19/2022    LOS: 1 day       Subjective     Chief Complaint   Patient presents with    Abdominal Pain       HPI:   Patient is a 45y.o. year old female with a past medical history of hypertension and ovarian cancer who presents with abdominal pain for 4 days. She vomitted yesterday which prompted her to go to the ER. Her vomiting has since resolved. She reports that she is currently passing gas and had a BM yesterday. She has a surgical history of hysterectomy and oophorectomy in 2016. Dr. Simi Hightower is her oncologist and she is currently in remission for her ovarian cancer. She was recently admitted 1 month ago for a SBO and did not have operative management at that time. She denies fever, chills, headache, chest pain, shortness of breast, urinary symptoms, hematochezia. CT ABD PELV WO CONT  IMPRESSION  Dilated, fluid-filled small bowel loops in the upper abdomen with swirling  mesentery and vessels in the left central abdomen, concerning for small bowel obstruction due to internal hernia. Small amount of free fluid. No free air.     7/20  Pt laying in bed, NAD. States she is feeling better. She is passing gas, no BM yesterday or today  Labs unremarkeable  Repeat CT ABD PELV WO CONT  IMPRESSION  1. Partial small bowel obstruction with transition point in the left mid abdomen as above. 2.  Free fluid in the pelvis. XR ABD (KUB)  IMPRESSION  Persistently dilated small bowel in the left hemiabdomen, comparable to yesterday's CT. Review of Systems   Constitutional: Negative. HENT: Negative. Eyes: Negative. Respiratory: Negative. Cardiovascular: Negative. Gastrointestinal:  Positive for abdominal pain. Genitourinary: Negative. Musculoskeletal: Negative. Skin: Negative. Neurological: Negative. Endo/Heme/Allergies: Negative. Psychiatric/Behavioral: Negative. Objective     Visit Vitals  /74   Pulse 60   Temp 97.9 °F (36.6 °C)   Resp 16   Ht 5' 8\" (1.727 m)   Wt 56.7 kg (125 lb)   SpO2 99%   BMI 19.01 kg/m²      O2 Device: None (Room air)    Physical Exam:   Physical Exam  Vitals reviewed. Constitutional:       Appearance: Normal appearance. She is normal weight. HENT:      Head: Normocephalic and atraumatic. Cardiovascular:      Rate and Rhythm: Normal rate and regular rhythm. Pulses: Normal pulses. Heart sounds: Normal heart sounds. Pulmonary:      Effort: Pulmonary effort is normal.      Breath sounds: Normal breath sounds. Abdominal:      General: Abdomen is flat. Bowel sounds are normal.      Palpations: Abdomen is soft. Tenderness: There is abdominal tenderness. Comments: Mid-line scar. Mild periumbilic and LLQ tenderness   Musculoskeletal:         General: Normal range of motion. Skin:     General: Skin is warm and dry. Neurological:      General: No focal deficit present. Mental Status: She is alert and oriented to person, place, and time. Intake & Output:  Current Shift: No intake/output data recorded. Last three shifts: 07/18 1901 - 07/20 0700  In: -   Out: 200     Lab/Data Review: All lab results for the last 24 hours reviewed.        24 Hour Results:    Recent Results (from the past 24 hour(s))   METABOLIC PANEL, COMPREHENSIVE    Collection Time: 07/20/22  5:32 AM   Result Value Ref Range    Sodium 138 136 - 145 mmol/L    Potassium 3.9 3.5 - 5.1 mmol/L    Chloride 108 97 - 108 mmol/L    CO2 23 21 - 32 mmol/L    Anion gap 7 5 - 15 mmol/L    Glucose 106 (H) 65 - 100 mg/dL    BUN 8 6 - 20 mg/dL    Creatinine 0.58 0.55 - 1.02 mg/dL    BUN/Creatinine ratio 14 12 - 20      GFR est AA >60 >60 ml/min/1.73m2    GFR est non-AA >60 >60 ml/min/1.73m2    Calcium 10.5 (H) 8.5 - 10.1 mg/dL    Bilirubin, total 0.7 0.2 - 1.0 mg/dL    AST (SGOT) 14 (L) 15 - 37 U/L    ALT (SGPT) 19 12 - 78 U/L    Alk. phosphatase 76 45 - 117 U/L    Protein, total 7.0 6.4 - 8.2 g/dL    Albumin 3.9 3.5 - 5.0 g/dL    Globulin 3.1 2.0 - 4.0 g/dL    A-G Ratio 1.3 1.1 - 2.2     CBC WITH AUTOMATED DIFF    Collection Time: 07/20/22  5:32 AM   Result Value Ref Range    WBC 6.3 3.6 - 11.0 K/uL    RBC 4.57 3.80 - 5.20 M/uL    HGB 14.6 11.5 - 16.0 g/dL    HCT 44.3 35.0 - 47.0 %    MCV 96.9 80.0 - 99.0 FL    MCH 31.9 26.0 - 34.0 PG    MCHC 33.0 30.0 - 36.5 g/dL    RDW 12.7 11.5 - 14.5 %    PLATELET 522 588 - 930 K/uL    MPV 11.4 8.9 - 12.9 FL    NRBC 0.0 0.0  WBC    ABSOLUTE NRBC 0.00 0.00 - 0.01 K/uL    NEUTROPHILS 69 32 - 75 %    LYMPHOCYTES 22 12 - 49 %    MONOCYTES 9 5 - 13 %    EOSINOPHILS 0 0 - 7 %    BASOPHILS 0 0 - 1 %    IMMATURE GRANULOCYTES 0 0 - 0.5 %    ABS. NEUTROPHILS 4.3 1.8 - 8.0 K/UL    ABS. LYMPHOCYTES 1.4 0.8 - 3.5 K/UL    ABS. MONOCYTES 0.6 0.0 - 1.0 K/UL    ABS. EOSINOPHILS 0.0 0.0 - 0.4 K/UL    ABS. BASOPHILS 0.0 0.0 - 0.1 K/UL    ABS. IMM. GRANS. 0.0 0.00 - 0.04 K/UL    DF AUTOMATED           Imaging:    XR ABD (KUB)   Final Result   Persistently dilated small bowel in the left hemiabdomen, comparable to   yesterday's CT. CT ABD PELV WO CONT   Final Result   1. Partial small bowel obstruction with transition point in the left mid   abdomen as above. 2.  Free fluid in the pelvis. CT ABD PELV WO CONT   Final Result   Dilated, fluid-filled small bowel loops in the upper abdomen with swirling   mesentery and vessels in the left central abdomen, concerning for small bowel   obstruction due to internal hernia. Small amount of free fluid. No free air.                 Assessment   Small bowel obstruction  Hypertension  Ovarian cancer  GERD  Depression    Persistently dilated small bowel in the left hemiabdomen, comparable to  yesterday's CT    Plan   Continue meds:  Amlodipine 5 mg po daily  Lovenox 40 mg subcu daily  Gabapentin 100 mg po daily  Protonix 20 mg po daily  Effexor XR 37.5 mg po daily     IV NS  Diet NPO  Follow-up with the surgery        Current Facility-Administered Medications:     sodium chloride (NS) flush 5-40 mL, 5-40 mL, IntraVENous, PRN, Mohiuddin, Romana Dallas, MD    pantoprazole (PROTONIX) tablet 20 mg, 20 mg, Oral, DAILY, Dora Osorio MD, 20 mg at 07/19/22 1115    amLODIPine (NORVASC) tablet 5 mg, 5 mg, Oral, DAILY, Dora Osorio MD, 5 mg at 07/19/22 1115    gabapentin (NEURONTIN) capsule 100 mg, 100 mg, Oral, DAILY, Dora Osorio MD, 100 mg at 07/19/22 1115    venlafaxine-SR (EFFEXOR-XR) capsule 37.5 mg, 37.5 mg, Oral, DAILY, Dora Osorio MD, 37.5 mg at 07/19/22 1339    0.9% sodium chloride infusion, 75 mL/hr, IntraVENous, CONTINUOUS, Dora Osorio MD, Last Rate: 75 mL/hr at 07/20/22 0958, 75 mL/hr at 07/20/22 0958    acetaminophen (TYLENOL) tablet 650 mg, 650 mg, Oral, Q6H PRN, 650 mg at 07/19/22 1338 **OR** acetaminophen (TYLENOL) suppository 650 mg, 650 mg, Rectal, Q6H PRN, Dora Osorio MD    polyethylene glycol (MIRALAX) packet 17 g, 17 g, Oral, DAILY PRN, Dora Osorio MD    ondansetron (ZOFRAN ODT) tablet 4 mg, 4 mg, Oral, Q8H PRN **OR** ondansetron (ZOFRAN) injection 4 mg, 4 mg, IntraVENous, Q6H PRN, Dora Osorio MD, 4 mg at 07/20/22 0500    enoxaparin (LOVENOX) injection 40 mg, 40 mg, SubCUTAneous, DAILY, Dora Osorio MD, 40 mg at 07/20/22 1027    HYDROmorphone (DILAUDID) injection 1 mg, 1 mg, IntraVENous, Q3H PRN, Lisa Delgado MD, 1 mg at 07/20/22 0907    Current Outpatient Medications   Medication Instructions    amLODIPine (NORVASC) 5 mg tablet 1 Tablet, Oral, DAILY    gabapentin (NEURONTIN) 100 mg, Oral, DAILY    multivitamin (ONE A DAY) tablet 1 Tablet, DAILY    omeprazole (PRILOSEC) 20 mg, Oral, DAILY    ondansetron hcl (ZOFRAN) 4 mg, Oral, EVERY 8 HOURS AS NEEDED    venlafaxine-SR (EFFEXOR-XR) 37.5 mg, Oral, DAILY         Signed By: Ab Altman MD     July 20, 2022

## 2022-07-20 NOTE — PROGRESS NOTES
5313 Shift change report from BECCA Arango RN consisting of SBAR,  Patient with call light in reach, bed in lowest position.

## 2022-07-20 NOTE — PROGRESS NOTES
PROGRESS NOTE    Patient: Al Kirby MRN: [id number]  SSN: [id number]    YOB: 1970  Age: 45 y.o. Sex: female      Admit Date: 7/19/2022    LOS: 1 day       Subjective     Chief Complaint   Patient presents with    Abdominal Pain       HPI:   Patient is a 45y.o. year old female with a past medical history of hypertension and ovarian cancer who presents with abdominal pain for 4 days. She vomitted yesterday which prompted her to go to the ER. Her vomiting has since resolved. She reports that she is currently passing gas and had a BM yesterday. She has a surgical history of hysterectomy and oophorectomy in 2016. Dr. Keli Morales is her oncologist and she is currently in remission for her ovarian cancer. She was recently admitted 1 month ago for a SBO and did not have operative management at that time. She denies fever, chills, headache, chest pain, shortness of breast, urinary symptoms, hematochezia. CT ABD PELV WO CONT  IMPRESSION  Dilated, fluid-filled small bowel loops in the upper abdomen with swirling  mesentery and vessels in the left central abdomen, concerning for small bowel obstruction due to internal hernia. Small amount of free fluid. No free air.     7/20  Pt laying in bed, NAD. States she is feeling better. She is passing gas, no BM yesterday or today  Labs unremarkeable  Repeat CT ABD PELV WO CONT  IMPRESSION  1. Partial small bowel obstruction with transition point in the left mid abdomen as above. 2.  Free fluid in the pelvis. XR ABD (KUB)  IMPRESSION  Persistently dilated small bowel in the left hemiabdomen, comparable to yesterday's CT. Review of Systems   Constitutional: Negative. HENT: Negative. Eyes: Negative. Respiratory: Negative. Cardiovascular: Negative. Gastrointestinal:  Positive for abdominal pain. Genitourinary: Negative. Musculoskeletal: Negative. Skin: Negative. Neurological: Negative. Endo/Heme/Allergies: Negative. Psychiatric/Behavioral: Negative. Objective     Visit Vitals  /74   Pulse 60   Temp 97.9 °F (36.6 °C)   Resp 16   Ht 5' 8\" (1.727 m)   Wt 56.7 kg (125 lb)   SpO2 99%   BMI 19.01 kg/m²      O2 Device: None (Room air)    Physical Exam:   Physical Exam  Vitals reviewed. Constitutional:       Appearance: Normal appearance. She is normal weight. HENT:      Head: Normocephalic and atraumatic. Cardiovascular:      Rate and Rhythm: Normal rate and regular rhythm. Pulses: Normal pulses. Heart sounds: Normal heart sounds. Pulmonary:      Effort: Pulmonary effort is normal.      Breath sounds: Normal breath sounds. Abdominal:      General: Abdomen is flat. Bowel sounds are normal.      Palpations: Abdomen is soft. Tenderness: There is abdominal tenderness. Comments: Mid-line scar. Mild periumbilic and LLQ tenderness   Musculoskeletal:         General: Normal range of motion. Skin:     General: Skin is warm and dry. Neurological:      General: No focal deficit present. Mental Status: She is alert and oriented to person, place, and time. Intake & Output:  Current Shift: No intake/output data recorded. Last three shifts: 07/18 1901 - 07/20 0700  In: -   Out: 200     Lab/Data Review: All lab results for the last 24 hours reviewed.        24 Hour Results:    Recent Results (from the past 24 hour(s))   METABOLIC PANEL, COMPREHENSIVE    Collection Time: 07/20/22  5:32 AM   Result Value Ref Range    Sodium 138 136 - 145 mmol/L    Potassium 3.9 3.5 - 5.1 mmol/L    Chloride 108 97 - 108 mmol/L    CO2 23 21 - 32 mmol/L    Anion gap 7 5 - 15 mmol/L    Glucose 106 (H) 65 - 100 mg/dL    BUN 8 6 - 20 mg/dL    Creatinine 0.58 0.55 - 1.02 mg/dL    BUN/Creatinine ratio 14 12 - 20      GFR est AA >60 >60 ml/min/1.73m2    GFR est non-AA >60 >60 ml/min/1.73m2    Calcium 10.5 (H) 8.5 - 10.1 mg/dL    Bilirubin, total 0.7 0.2 - 1.0 mg/dL    AST (SGOT) 14 (L) 15 - 37 U/L    ALT (SGPT) 19 12 - 78 U/L    Alk. phosphatase 76 45 - 117 U/L    Protein, total 7.0 6.4 - 8.2 g/dL    Albumin 3.9 3.5 - 5.0 g/dL    Globulin 3.1 2.0 - 4.0 g/dL    A-G Ratio 1.3 1.1 - 2.2     CBC WITH AUTOMATED DIFF    Collection Time: 07/20/22  5:32 AM   Result Value Ref Range    WBC 6.3 3.6 - 11.0 K/uL    RBC 4.57 3.80 - 5.20 M/uL    HGB 14.6 11.5 - 16.0 g/dL    HCT 44.3 35.0 - 47.0 %    MCV 96.9 80.0 - 99.0 FL    MCH 31.9 26.0 - 34.0 PG    MCHC 33.0 30.0 - 36.5 g/dL    RDW 12.7 11.5 - 14.5 %    PLATELET 191 539 - 028 K/uL    MPV 11.4 8.9 - 12.9 FL    NRBC 0.0 0.0  WBC    ABSOLUTE NRBC 0.00 0.00 - 0.01 K/uL    NEUTROPHILS 69 32 - 75 %    LYMPHOCYTES 22 12 - 49 %    MONOCYTES 9 5 - 13 %    EOSINOPHILS 0 0 - 7 %    BASOPHILS 0 0 - 1 %    IMMATURE GRANULOCYTES 0 0 - 0.5 %    ABS. NEUTROPHILS 4.3 1.8 - 8.0 K/UL    ABS. LYMPHOCYTES 1.4 0.8 - 3.5 K/UL    ABS. MONOCYTES 0.6 0.0 - 1.0 K/UL    ABS. EOSINOPHILS 0.0 0.0 - 0.4 K/UL    ABS. BASOPHILS 0.0 0.0 - 0.1 K/UL    ABS. IMM. GRANS. 0.0 0.00 - 0.04 K/UL    DF AUTOMATED           Imaging:    CT ABD PELV WO CONT   Final Result   1. Partial small bowel obstruction with transition point in the left mid   abdomen as above. 2.  Free fluid in the pelvis. CT ABD PELV WO CONT   Final Result   Dilated, fluid-filled small bowel loops in the upper abdomen with swirling   mesentery and vessels in the left central abdomen, concerning for small bowel   obstruction due to internal hernia. Small amount of free fluid. No free air.                 Assessment   Small bowel obstruction  Hypertension  Ovarian cancer  GERD  Depression    Plan   Continue meds:  Amlodipine 5 mg po daily  Lovenox 40 mg subcu daily  Gabapentin 100 mg po daily  Protonix 20 mg po daily  Effexor XR 37.5 mg po daily     IV NS  Diet NPO        Current Facility-Administered Medications:     sodium chloride (NS) flush 5-40 mL, 5-40 mL, IntraVENous, PRN, Dora Osorio MD    pantoprazole (PROTONIX) tablet 20 mg, 20 mg, Oral, DAILY, Dora Osorio MD, 20 mg at 07/19/22 1115    amLODIPine (NORVASC) tablet 5 mg, 5 mg, Oral, DAILY, Dora Osorio MD, 5 mg at 07/19/22 1115    gabapentin (NEURONTIN) capsule 100 mg, 100 mg, Oral, DAILY, Dora Osorio MD, 100 mg at 07/19/22 1115    venlafaxine-SR (EFFEXOR-XR) capsule 37.5 mg, 37.5 mg, Oral, DAILY, Doar Osorio MD, 37.5 mg at 07/19/22 1339    0.9% sodium chloride infusion, 75 mL/hr, IntraVENous, CONTINUOUS, Dora Osorio MD, Last Rate: 75 mL/hr at 07/19/22 2122, 75 mL/hr at 07/19/22 2122    acetaminophen (TYLENOL) tablet 650 mg, 650 mg, Oral, Q6H PRN, 650 mg at 07/19/22 1338 **OR** acetaminophen (TYLENOL) suppository 650 mg, 650 mg, Rectal, Q6H PRN, Dora Osorio MD    polyethylene glycol (MIRALAX) packet 17 g, 17 g, Oral, DAILY PRN, Dora Osorio MD    ondansetron (ZOFRAN ODT) tablet 4 mg, 4 mg, Oral, Q8H PRN **OR** ondansetron (ZOFRAN) injection 4 mg, 4 mg, IntraVENous, Q6H PRN, Dora Osorio MD, 4 mg at 07/20/22 0500    enoxaparin (LOVENOX) injection 40 mg, 40 mg, SubCUTAneous, DAILY, Krunal Osorio MD    HYDROmorphone (DILAUDID) injection 1 mg, 1 mg, IntraVENous, Q3H PRN, Uche Delgado MD, 1 mg at 07/20/22 0907    Current Outpatient Medications   Medication Instructions    amLODIPine (NORVASC) 5 mg tablet 1 Tablet, Oral, DAILY    gabapentin (NEURONTIN) 100 mg, Oral, DAILY    multivitamin (ONE A DAY) tablet 1 Tablet, DAILY    omeprazole (PRILOSEC) 20 mg, Oral, DAILY    ondansetron hcl (ZOFRAN) 4 mg, Oral, EVERY 8 HOURS AS NEEDED    venlafaxine-SR (EFFEXOR-XR) 37.5 mg, Oral, DAILY         Signed By: Abdoulaye Britton     July 20, 2022

## 2022-07-21 LAB
ALBUMIN SERPL-MCNC: 3.8 G/DL (ref 3.5–5)
ALBUMIN/GLOB SERPL: 1.2 {RATIO} (ref 1.1–2.2)
ALP SERPL-CCNC: 76 U/L (ref 45–117)
ALT SERPL-CCNC: 20 U/L (ref 12–78)
ANION GAP SERPL CALC-SCNC: 6 MMOL/L (ref 5–15)
AST SERPL W P-5'-P-CCNC: 13 U/L (ref 15–37)
BILIRUB SERPL-MCNC: 0.8 MG/DL (ref 0.2–1)
BUN SERPL-MCNC: 8 MG/DL (ref 6–20)
BUN/CREAT SERPL: 14 (ref 12–20)
CA-I BLD-MCNC: 10.8 MG/DL (ref 8.5–10.1)
CHLORIDE SERPL-SCNC: 104 MMOL/L (ref 97–108)
CO2 SERPL-SCNC: 26 MMOL/L (ref 21–32)
CREAT SERPL-MCNC: 0.56 MG/DL (ref 0.55–1.02)
ERYTHROCYTE [DISTWIDTH] IN BLOOD BY AUTOMATED COUNT: 12.4 % (ref 11.5–14.5)
GLOBULIN SER CALC-MCNC: 3.2 G/DL (ref 2–4)
GLUCOSE SERPL-MCNC: 98 MG/DL (ref 65–100)
HCT VFR BLD AUTO: 44.5 % (ref 35–47)
HGB BLD-MCNC: 15 G/DL (ref 11.5–16)
MCH RBC QN AUTO: 32.8 PG (ref 26–34)
MCHC RBC AUTO-ENTMCNC: 33.7 G/DL (ref 30–36.5)
MCV RBC AUTO: 97.4 FL (ref 80–99)
NRBC # BLD: 0 K/UL (ref 0–0.01)
NRBC BLD-RTO: 0 PER 100 WBC
PLATELET # BLD AUTO: 150 K/UL (ref 150–400)
PMV BLD AUTO: 11.8 FL (ref 8.9–12.9)
POTASSIUM SERPL-SCNC: 4 MMOL/L (ref 3.5–5.1)
PROT SERPL-MCNC: 7 G/DL (ref 6.4–8.2)
RBC # BLD AUTO: 4.57 M/UL (ref 3.8–5.2)
SODIUM SERPL-SCNC: 136 MMOL/L (ref 136–145)
WBC # BLD AUTO: 5.3 K/UL (ref 3.6–11)

## 2022-07-21 PROCEDURE — 85027 COMPLETE CBC AUTOMATED: CPT

## 2022-07-21 PROCEDURE — 74011250636 HC RX REV CODE- 250/636: Performed by: FAMILY MEDICINE

## 2022-07-21 PROCEDURE — 74011250637 HC RX REV CODE- 250/637: Performed by: FAMILY MEDICINE

## 2022-07-21 PROCEDURE — 65270000029 HC RM PRIVATE

## 2022-07-21 PROCEDURE — 74011250636 HC RX REV CODE- 250/636: Performed by: SURGERY

## 2022-07-21 PROCEDURE — 36415 COLL VENOUS BLD VENIPUNCTURE: CPT

## 2022-07-21 PROCEDURE — 80053 COMPREHEN METABOLIC PANEL: CPT

## 2022-07-21 RX ADMIN — PANTOPRAZOLE SODIUM 20 MG: 20 TABLET, DELAYED RELEASE ORAL at 08:31

## 2022-07-21 RX ADMIN — SODIUM CHLORIDE 75 ML/HR: 9 INJECTION, SOLUTION INTRAVENOUS at 00:33

## 2022-07-21 RX ADMIN — AMLODIPINE BESYLATE 5 MG: 5 TABLET ORAL at 08:31

## 2022-07-21 RX ADMIN — HYDROMORPHONE HYDROCHLORIDE 1 MG: 1 INJECTION, SOLUTION INTRAMUSCULAR; INTRAVENOUS; SUBCUTANEOUS at 21:22

## 2022-07-21 RX ADMIN — HYDROMORPHONE HYDROCHLORIDE 1 MG: 1 INJECTION, SOLUTION INTRAMUSCULAR; INTRAVENOUS; SUBCUTANEOUS at 01:20

## 2022-07-21 RX ADMIN — ENOXAPARIN SODIUM 40 MG: 100 INJECTION SUBCUTANEOUS at 08:30

## 2022-07-21 RX ADMIN — HYDROMORPHONE HYDROCHLORIDE 1 MG: 1 INJECTION, SOLUTION INTRAMUSCULAR; INTRAVENOUS; SUBCUTANEOUS at 06:12

## 2022-07-21 RX ADMIN — VENLAFAXINE HYDROCHLORIDE 37.5 MG: 37.5 CAPSULE, EXTENDED RELEASE ORAL at 08:32

## 2022-07-21 RX ADMIN — GABAPENTIN 100 MG: 100 CAPSULE ORAL at 08:31

## 2022-07-21 RX ADMIN — SODIUM CHLORIDE 75 ML/HR: 9 INJECTION, SOLUTION INTRAVENOUS at 14:19

## 2022-07-21 RX ADMIN — ACETAMINOPHEN 650 MG: 325 TABLET, FILM COATED ORAL at 08:36

## 2022-07-21 RX ADMIN — HYDROMORPHONE HYDROCHLORIDE 1 MG: 1 INJECTION, SOLUTION INTRAMUSCULAR; INTRAVENOUS; SUBCUTANEOUS at 14:19

## 2022-07-21 RX ADMIN — POLYETHYLENE GLYCOL 3350 17 G: 17 POWDER, FOR SOLUTION ORAL at 01:20

## 2022-07-21 NOTE — PROGRESS NOTES
TRANSFER - OUT REPORT:    Verbal report given to Casi Jacobo on [facility]  being transferred to [address] [place] [address] for routine progression of care       Report consisted of patients Situation, Background, Assessment and   Recommendations(SBAR). Information from the following report(s) SBAR, Kardex, Intake/Output, MAR, Recent Results, and Cardiac Rhythm    was reviewed with the receiving nurse. Lines:   Peripheral IV 07/19/22 Anterior;Right Forearm (Active)   Site Assessment Clean, dry, & intact 07/21/22 0723   Phlebitis Assessment 0 07/21/22 0723   Infiltration Assessment 0 07/21/22 0723   Dressing Status Clean, dry, & intact 07/21/22 0723   Dressing Type Transparent;Tape 07/21/22 0723   Hub Color/Line Status Blue; Infusing 07/21/22 0723   Alcohol Cap Used Yes 07/21/22 0723        Opportunity for questions and clarification was provided. Patient resting at this time. Will transfer out when patient wakes up.

## 2022-07-21 NOTE — PROGRESS NOTES
Patient transported by wheelchair to [facility] with all personal belongings. Awake, alert and stable. No concerns for transfer.  Primary RN aware of patient arrival.

## 2022-07-21 NOTE — PROGRESS NOTES
Received pt form Katie Francois pt is stable, alert, oriented and does not show any signs of distress. Pt states she is not in pain and denies josé miguel shortness of breath or chest pain. Will continue to care for pt until end of shift.

## 2022-07-21 NOTE — PROGRESS NOTES
Patient not progressing on following goal:     Problem: Small Bowel Obstruction - Non Surgical: Discharge Outcomes  Goal: *Active bowel function  Outcome: Not Progressing Towards Goal     Advanced diet to clear liquids, lengthen time between narcotic pain meds as tolerated, encourage activity to stimulate bowel function, laxative administration as needed/as ordered. Education print outs given on bowel blockage and clear liquid diet. Verbal education given on disease process and plan of care. Will continue to monitor.

## 2022-07-21 NOTE — PROGRESS NOTES
PROGRESS NOTE    Patient: Edmond Gao MRN: [id number]  SSN: [id number]    YOB: 1970  Age: 45 y.o. Sex: female      Admit Date: 7/19/2022    LOS: 2 days       Subjective     Chief Complaint   Patient presents with    Abdominal Pain       HPI:   Patient is a 45y.o. year old female with a past medical history of hypertension and ovarian cancer who presents with abdominal pain for 4 days. She vomitted yesterday which prompted her to go to the ER. Her vomiting has since resolved. She reports that she is currently passing gas and had a BM yesterday. She has a surgical history of hysterectomy and oophorectomy in 2016. Dr. Simi Hightower is her oncologist and she is currently in remission for her ovarian cancer. She was recently admitted 1 month ago for a SBO and did not have operative management at that time. She denies fever, chills, headache, chest pain, shortness of breast, urinary symptoms, hematochezia. CT ABD PELV WO CONT  IMPRESSION  Dilated, fluid-filled small bowel loops in the upper abdomen with swirling  mesentery and vessels in the left central abdomen, concerning for small bowel obstruction due to internal hernia. Small amount of free fluid. No free air.     7/20  Pt laying in bed, NAD. States she is feeling better. She is passing gas, no BM yesterday or today  Labs unremarkeable  Repeat CT ABD PELV WO CONT  IMPRESSION  1. Partial small bowel obstruction with transition point in the left mid abdomen as above. 2.  Free fluid in the pelvis. XR ABD (KUB)  IMPRESSION  Persistently dilated small bowel in the left hemiabdomen, comparable to yesterday's CT.    7/21  Bradycardia at 52  Labs unremarkeable  Some bloating today after eating broth  Feels better overall   Passing gas, No BM       Review of Systems   Constitutional: Negative. HENT: Negative. Eyes: Negative. Respiratory: Negative. Cardiovascular: Negative. Gastrointestinal:  Positive for abdominal pain. Genitourinary: Negative. Musculoskeletal: Negative. Skin: Negative. Neurological: Negative. Endo/Heme/Allergies: Negative. Psychiatric/Behavioral: Negative. Objective     Visit Vitals  /79 (BP 1 Location: Left upper arm, BP Patient Position: At rest)   Pulse (!) 52   Temp 98.3 °F (36.8 °C)   Resp 16   Ht 5' 8\" (1.727 m)   Wt 56.7 kg (125 lb)   SpO2 100%   BMI 19.01 kg/m²      O2 Device: None (Room air)    Physical Exam:   Physical Exam  Vitals reviewed. Constitutional:       Appearance: Normal appearance. She is normal weight. HENT:      Head: Normocephalic and atraumatic. Cardiovascular:      Rate and Rhythm: Normal rate and regular rhythm. Pulses: Normal pulses. Heart sounds: Normal heart sounds. Pulmonary:      Effort: Pulmonary effort is normal.      Breath sounds: Normal breath sounds. Abdominal:      General: Abdomen is flat. Bowel sounds are normal.      Palpations: Abdomen is soft. Tenderness: There is abdominal tenderness. Comments: Mid-line scar. Mild periumbilic and LLQ tenderness   Musculoskeletal:         General: Normal range of motion. Skin:     General: Skin is warm and dry. Neurological:      General: No focal deficit present. Mental Status: She is alert and oriented to person, place, and time. Intake & Output:  Current Shift: 07/21 0701 - 07/21 1900  In: 153.8 [I.V.:153.8]  Out: -   Last three shifts: 07/19 1901 - 07/21 0700  In: 2352.5 [I.V.:2352.5]  Out: -     Lab/Data Review: All lab results for the last 24 hours reviewed.        24 Hour Results:    Recent Results (from the past 24 hour(s))   CBC W/O DIFF    Collection Time: 07/21/22  6:05 AM   Result Value Ref Range    WBC 5.3 3.6 - 11.0 K/uL    RBC 4.57 3.80 - 5.20 M/uL    HGB 15.0 11.5 - 16.0 g/dL    HCT 44.5 35.0 - 47.0 %    MCV 97.4 80.0 - 99.0 FL    MCH 32.8 26.0 - 34.0 PG    MCHC 33.7 30.0 - 36.5 g/dL    RDW 12.4 11.5 - 14.5 %    PLATELET 453 491 - 786 K/uL    MPV 11.8 8.9 - 12.9 FL    NRBC 0.0 0.0  WBC    ABSOLUTE NRBC 0.00 0.00 - 4.71 K/uL   METABOLIC PANEL, COMPREHENSIVE    Collection Time: 07/21/22  6:05 AM   Result Value Ref Range    Sodium 136 136 - 145 mmol/L    Potassium 4.0 3.5 - 5.1 mmol/L    Chloride 104 97 - 108 mmol/L    CO2 26 21 - 32 mmol/L    Anion gap 6 5 - 15 mmol/L    Glucose 98 65 - 100 mg/dL    BUN 8 6 - 20 mg/dL    Creatinine 0.56 0.55 - 1.02 mg/dL    BUN/Creatinine ratio 14 12 - 20      GFR est AA >60 >60 ml/min/1.73m2    GFR est non-AA >60 >60 ml/min/1.73m2    Calcium 10.8 (H) 8.5 - 10.1 mg/dL    Bilirubin, total 0.8 0.2 - 1.0 mg/dL    AST (SGOT) 13 (L) 15 - 37 U/L    ALT (SGPT) 20 12 - 78 U/L    Alk. phosphatase 76 45 - 117 U/L    Protein, total 7.0 6.4 - 8.2 g/dL    Albumin 3.8 3.5 - 5.0 g/dL    Globulin 3.2 2.0 - 4.0 g/dL    A-G Ratio 1.2 1.1 - 2.2           Imaging:    XR ABD (KUB)   Final Result   Persistently dilated small bowel in the left hemiabdomen, comparable to   yesterday's CT. CT ABD PELV WO CONT   Final Result   1. Partial small bowel obstruction with transition point in the left mid   abdomen as above. 2.  Free fluid in the pelvis. CT ABD PELV WO CONT   Final Result   Dilated, fluid-filled small bowel loops in the upper abdomen with swirling   mesentery and vessels in the left central abdomen, concerning for small bowel   obstruction due to internal hernia. Small amount of free fluid. No free air.                 Assessment   Small bowel obstruction  Hypertension  Ovarian cancer  GERD  Depression    KUB:  Persistently dilated small bowel in the left hemiabdomen, comparable to  yesterday's CT    Plan   Continue meds:  Amlodipine 5 mg po daily  Lovenox 40 mg subcu daily  Gabapentin 100 mg po daily  Protonix 20 mg po daily  Effexor XR 37.5 mg po daily     IV NS  Advance diet to clear liquids  Lengthen time between narcotic pain meds as tolerated  Encourage activity  Miralax prn   Follow-up with surgery        Current Facility-Administered Medications:     sodium chloride (NS) flush 5-40 mL, 5-40 mL, IntraVENous, PRN, Bridgette Osorio MD    pantoprazole (PROTONIX) tablet 20 mg, 20 mg, Oral, DAILY, Dora Osorio MD, 20 mg at 07/21/22 0831    amLODIPine (NORVASC) tablet 5 mg, 5 mg, Oral, DAILY, Dora Osorio MD, 5 mg at 07/21/22 0831    gabapentin (NEURONTIN) capsule 100 mg, 100 mg, Oral, DAILY, Dora Osorio MD, 100 mg at 07/21/22 0831    venlafaxine-SR (EFFEXOR-XR) capsule 37.5 mg, 37.5 mg, Oral, DAILY, Imtiaz Chakraborty MD, 37.5 mg at 07/21/22 5178    0.9% sodium chloride infusion, 75 mL/hr, IntraVENous, CONTINUOUS, Dora Osorio MD, Last Rate: 75 mL/hr at 07/21/22 0033, 75 mL/hr at 07/21/22 0033    acetaminophen (TYLENOL) tablet 650 mg, 650 mg, Oral, Q6H PRN, 650 mg at 07/21/22 0836 **OR** acetaminophen (TYLENOL) suppository 650 mg, 650 mg, Rectal, Q6H PRN, Dora Osorio MD    polyethylene glycol (MIRALAX) packet 17 g, 17 g, Oral, DAILY PRN, Imtiaz Chakraborty MD, 17 g at 07/21/22 0120    ondansetron (ZOFRAN ODT) tablet 4 mg, 4 mg, Oral, Q8H PRN **OR** ondansetron (ZOFRAN) injection 4 mg, 4 mg, IntraVENous, Q6H PRN, Dora Osorio MD, 4 mg at 07/20/22 0500    enoxaparin (LOVENOX) injection 40 mg, 40 mg, SubCUTAneous, DAILY, Dora Osorio MD, 40 mg at 07/21/22 0830    HYDROmorphone (DILAUDID) injection 1 mg, 1 mg, IntraVENous, Q3H PRN, Deyanira Delgado MD, 1 mg at 07/21/22 1527    Current Outpatient Medications   Medication Instructions    amLODIPine (NORVASC) 5 mg tablet 1 Tablet, Oral, DAILY    gabapentin (NEURONTIN) 100 mg, Oral, DAILY    multivitamin (ONE A DAY) tablet 1 Tablet, DAILY    omeprazole (PRILOSEC) 20 mg, Oral, DAILY    ondansetron hcl (ZOFRAN) 4 mg, Oral, EVERY 8 HOURS AS NEEDED    venlafaxine-SR (EFFEXOR-XR) 37.5 mg, Oral, DAILY         Signed By: Jony Martins MD     July 21, 2022

## 2022-07-21 NOTE — PROGRESS NOTES
PROGRESS NOTE    Patient: Ricardo Longo MRN: [id number]  SSN: [id number]    YOB: 1970  Age: 45 y.o. Sex: female      Admit Date: 7/19/2022    LOS: 2 days       Subjective     Chief Complaint   Patient presents with    Abdominal Pain       HPI:   Patient is a 45y.o. year old female with a past medical history of hypertension and ovarian cancer who presents with abdominal pain for 4 days. She vomitted yesterday which prompted her to go to the ER. Her vomiting has since resolved. She reports that she is currently passing gas and had a BM yesterday. She has a surgical history of hysterectomy and oophorectomy in 2016. Dr. Marcelo Reed is her oncologist and she is currently in remission for her ovarian cancer. She was recently admitted 1 month ago for a SBO and did not have operative management at that time. She denies fever, chills, headache, chest pain, shortness of breast, urinary symptoms, hematochezia. CT ABD PELV WO CONT  IMPRESSION  Dilated, fluid-filled small bowel loops in the upper abdomen with swirling  mesentery and vessels in the left central abdomen, concerning for small bowel obstruction due to internal hernia. Small amount of free fluid. No free air.     7/20  Pt laying in bed, NAD. States she is feeling better. She is passing gas, no BM yesterday or today  Labs unremarkeable  Repeat CT ABD PELV WO CONT  IMPRESSION  1. Partial small bowel obstruction with transition point in the left mid abdomen as above. 2.  Free fluid in the pelvis. XR ABD (KUB)  IMPRESSION  Persistently dilated small bowel in the left hemiabdomen, comparable to yesterday's CT.    7/21  Bradycardia at 52  Labs unremarkeable  Some bloating today after eating broth  Feels better overall   Passing gas, No BM       Review of Systems   Constitutional: Negative. HENT: Negative. Eyes: Negative. Respiratory: Negative. Cardiovascular: Negative. Gastrointestinal:  Positive for abdominal pain. Genitourinary: Negative. Musculoskeletal: Negative. Skin: Negative. Neurological: Negative. Endo/Heme/Allergies: Negative. Psychiatric/Behavioral: Negative. Objective     Visit Vitals  /79 (BP 1 Location: Left upper arm, BP Patient Position: At rest)   Pulse (!) 52   Temp 98.3 °F (36.8 °C)   Resp 16   Ht 5' 8\" (1.727 m)   Wt 56.7 kg (125 lb)   SpO2 100%   BMI 19.01 kg/m²      O2 Device: None (Room air)    Physical Exam:   Physical Exam  Vitals reviewed. Constitutional:       Appearance: Normal appearance. She is normal weight. HENT:      Head: Normocephalic and atraumatic. Cardiovascular:      Rate and Rhythm: Normal rate and regular rhythm. Pulses: Normal pulses. Heart sounds: Normal heart sounds. Pulmonary:      Effort: Pulmonary effort is normal.      Breath sounds: Normal breath sounds. Abdominal:      General: Abdomen is flat. Bowel sounds are normal.      Palpations: Abdomen is soft. Tenderness: There is abdominal tenderness. Comments: Mid-line scar. Mild periumbilic and LLQ tenderness   Musculoskeletal:         General: Normal range of motion. Skin:     General: Skin is warm and dry. Neurological:      General: No focal deficit present. Mental Status: She is alert and oriented to person, place, and time. Intake & Output:  Current Shift: No intake/output data recorded. Last three shifts: 07/19 1901 - 07/21 0700  In: 2352.5 [I.V.:2352.5]  Out: -     Lab/Data Review: All lab results for the last 24 hours reviewed.        24 Hour Results:    Recent Results (from the past 24 hour(s))   CBC W/O DIFF    Collection Time: 07/21/22  6:05 AM   Result Value Ref Range    WBC 5.3 3.6 - 11.0 K/uL    RBC 4.57 3.80 - 5.20 M/uL    HGB 15.0 11.5 - 16.0 g/dL    HCT 44.5 35.0 - 47.0 %    MCV 97.4 80.0 - 99.0 FL    MCH 32.8 26.0 - 34.0 PG    MCHC 33.7 30.0 - 36.5 g/dL    RDW 12.4 11.5 - 14.5 %    PLATELET 083 243 - 381 K/uL    MPV 11.8 8.9 - 12.9 FL NRBC 0.0 0.0  WBC    ABSOLUTE NRBC 0.00 0.00 - 1.07 K/uL   METABOLIC PANEL, COMPREHENSIVE    Collection Time: 07/21/22  6:05 AM   Result Value Ref Range    Sodium 136 136 - 145 mmol/L    Potassium 4.0 3.5 - 5.1 mmol/L    Chloride 104 97 - 108 mmol/L    CO2 26 21 - 32 mmol/L    Anion gap 6 5 - 15 mmol/L    Glucose 98 65 - 100 mg/dL    BUN 8 6 - 20 mg/dL    Creatinine 0.56 0.55 - 1.02 mg/dL    BUN/Creatinine ratio 14 12 - 20      GFR est AA >60 >60 ml/min/1.73m2    GFR est non-AA >60 >60 ml/min/1.73m2    Calcium 10.8 (H) 8.5 - 10.1 mg/dL    Bilirubin, total 0.8 0.2 - 1.0 mg/dL    AST (SGOT) 13 (L) 15 - 37 U/L    ALT (SGPT) 20 12 - 78 U/L    Alk. phosphatase 76 45 - 117 U/L    Protein, total 7.0 6.4 - 8.2 g/dL    Albumin 3.8 3.5 - 5.0 g/dL    Globulin 3.2 2.0 - 4.0 g/dL    A-G Ratio 1.2 1.1 - 2.2           Imaging:    XR ABD (KUB)   Final Result   Persistently dilated small bowel in the left hemiabdomen, comparable to   yesterday's CT. CT ABD PELV WO CONT   Final Result   1. Partial small bowel obstruction with transition point in the left mid   abdomen as above. 2.  Free fluid in the pelvis. CT ABD PELV WO CONT   Final Result   Dilated, fluid-filled small bowel loops in the upper abdomen with swirling   mesentery and vessels in the left central abdomen, concerning for small bowel   obstruction due to internal hernia. Small amount of free fluid. No free air.                 Assessment   Small bowel obstruction  Hypertension  Ovarian cancer  GERD  Depression    KUB:  Persistently dilated small bowel in the left hemiabdomen, comparable to  yesterday's CT    Plan   Continue meds:  Amlodipine 5 mg po daily  Lovenox 40 mg subcu daily  Gabapentin 100 mg po daily  Protonix 20 mg po daily  Effexor XR 37.5 mg po daily     IV NS  Advance diet to clear liquids  Lengthen time between narcotic pain meds as tolerated  Encourage activity  Miralax prn   Follow-up with surgery        Current Facility-Administered Medications:     sodium chloride (NS) flush 5-40 mL, 5-40 mL, IntraVENous, PRN, Boston Osorio MD    pantoprazole (PROTONIX) tablet 20 mg, 20 mg, Oral, DAILY, Dora Osorio MD, 20 mg at 07/21/22 0831    amLODIPine (NORVASC) tablet 5 mg, 5 mg, Oral, DAILY, Dora Osorio MD, 5 mg at 07/21/22 0831    gabapentin (NEURONTIN) capsule 100 mg, 100 mg, Oral, DAILY, Dora Osorio MD, 100 mg at 07/21/22 0831    venlafaxine-SR (EFFEXOR-XR) capsule 37.5 mg, 37.5 mg, Oral, DAILY, Kat Bolden MD, 37.5 mg at 07/21/22 8693    0.9% sodium chloride infusion, 75 mL/hr, IntraVENous, CONTINUOUS, Dora Osorio MD, Last Rate: 75 mL/hr at 07/21/22 0033, 75 mL/hr at 07/21/22 0033    acetaminophen (TYLENOL) tablet 650 mg, 650 mg, Oral, Q6H PRN, 650 mg at 07/21/22 0836 **OR** acetaminophen (TYLENOL) suppository 650 mg, 650 mg, Rectal, Q6H PRN, Dora Osorio MD    polyethylene glycol (MIRALAX) packet 17 g, 17 g, Oral, DAILY PRN, Dora Osorio MD, 17 g at 07/21/22 0120    ondansetron (ZOFRAN ODT) tablet 4 mg, 4 mg, Oral, Q8H PRN **OR** ondansetron (ZOFRAN) injection 4 mg, 4 mg, IntraVENous, Q6H PRN, Dora Osorio MD, 4 mg at 07/20/22 0500    enoxaparin (LOVENOX) injection 40 mg, 40 mg, SubCUTAneous, DAILY, Dora Osorio MD, 40 mg at 07/21/22 0830    HYDROmorphone (DILAUDID) injection 1 mg, 1 mg, IntraVENous, Q3H PRN, Deyanira Delgado MD, 1 mg at 07/21/22 8286    Current Outpatient Medications   Medication Instructions    amLODIPine (NORVASC) 5 mg tablet 1 Tablet, Oral, DAILY    gabapentin (NEURONTIN) 100 mg, Oral, DAILY    multivitamin (ONE A DAY) tablet 1 Tablet, DAILY    omeprazole (PRILOSEC) 20 mg, Oral, DAILY    ondansetron hcl (ZOFRAN) 4 mg, Oral, EVERY 8 HOURS AS NEEDED    venlafaxine-SR (EFFEXOR-XR) 37.5 mg, Oral, DAILY         Signed By: Adrian Larkin     July 21, 2022

## 2022-07-21 NOTE — PROGRESS NOTES
Chief Complaint: Abdominal pain      Subjective:  Ms. José Toro is a 45y.o. year old * female with history of ovarian cancer S/P TAHBSO, previously admitted from 06/04/22-06/12/22 and was discharged home after improvement with conservative management who was readmitted for abdominal pain concerning for recurrent SBO. Patient reports feeling improved compared to yesterday. Having less abdominal pain. She has been able to tolerate clear liquids without nausea or vomiting. Is passing a lot flatus and belching but no Bms. Review of Systems:   Constitutional:  no fever, no chills,  no sweats, No weakness, No fatigue, No decreased activity. Respiratory: No shortness of breath, No cough, No sputum production, No hemoptysis, No wheezing, No cyanosis. Cardiovascular: No chest pain, No palpitations, No bradycardia, No tachycardia, No peripheral edema, No syncope. Gastrointestinal: No nausea, No vomiting, No diarrhea, No constipation, No heartburn, Abdominal pain. Genitourinary: No dysuria, No hematuria, No change in urine stream, No urethral discharge, No lesions. Hematology/Lymphatics: No bruising tendency, No bleeding tendency, No petechiae, No swollen lymph glands. Endocrine: No excessive thirst, No polyuria, No cold intolerance, No heat intolerance, No excessive hunger. Musculoskeletal: No back pain, No neck pain, No joint pain, No muscle pain, No claudication, No decreased range of motion, No trauma. Integumentary: No rash, No pruritus, No abrasions. Neurologic: Alert and oriented X4, No abnormal balance, No headache, No confusion, No numbness, No tingling. Psychiatric: No anxiety, No depression, No matt. Physical Exam:     Vitals & Measurements:     Wt Readings from Last 3 Encounters:   07/19/22 56.7 kg (125 lb)   06/12/22 62.6 kg (138 lb 0.1 oz)   06/01/22 61.2 kg (135 lb)     Temp Readings from Last 3 Encounters:   07/21/22 98.3 °F (36.8 °C)   06/12/22 98.2 °F (36.8 °C)   06/01/22 98 °F (36.7 °C)     BP Readings from Last 3 Encounters:   07/21/22 116/79   06/11/22 138/88   06/01/22 116/86     Pulse Readings from Last 3 Encounters:   07/21/22 (!) 52   06/12/22 71   06/01/22 86      Ht Readings from Last 3 Encounters:   07/19/22 5' 8\" (1.727 m)   06/09/22 5' 7\" (1.702 m)   06/01/22 5' 7\" (1.702 m)      Date 07/20/22 0700 - 07/21/22 0659 07/21/22 0700 - 07/22/22 0659   Shift 3909-3699 4432-8877 24 Hour Total 7352-7780 0287-9224 24 Hour Total   INTAKE   P.O. 0  0        P. O. 0  0      I. V.(mL/kg/hr) 900(1.3) 1452.5(2.1) 2352.5(1.7) 153.8  153.8     I. V. 900  900        Volume (0.9% sodium chloride infusion)  1452.5 1452.5 153.8  153.8   Shift Total(mL/kg) 900(15.9) 1452. 5(25.6) 2352. 5(41.5) 153.8(2.7)  153.8(2.7)   OUTPUT   Urine(mL/kg/hr)           Urine Occurrence(s) 2 x 3 x 5 x 2 x  2 x   Stool           Stool Occurrence(s)    1 x  1 x   Shift Total(mL/kg)          1452.5 2352.5 153.8  153.8   Weight (kg) 56.7 56.7 56.7 56.7 56.7 56.7       General: well appearing, no acute distress  Head: Normal  Face: Nornal  HEENT: atraumatic, PERRLA, moist mucosa, normal pharynx, normal tonsils and adenoids, normal tongue, no fluid in sinuses  Neck: Trachea midline, no carotid bruit, no masses  Chest: Normal.  Respiratory: normal chest wall expansion, CTA B, no r/r/w, no rubs  Cardiovascular: RRR, no m/r/g, Normal S1 and S2  Abdomen: Soft, mild lower abdominal tenderness, non-distended, normal bowel sounds in all quadrants, no hepatosplenomegaly, no tympany. Incision scar: well healed long midline surgical incision. Genitourinary: No inguinal hernia, normal external gentalia, no renal angle tenderness  Rectal: deferred  Musculoskeletal: Normal ROM in upper and lower extremities, No joint swelling.   Integumentary: Warm, dry, and pink, with no rash, purpura, or petechia  Heme/Lymph: No lymphadenopathy, no bruises  Neurological: Cranial Nerves II-XII grossly intact, No gross sensory or motor deficit. Psychiatric: Cooperative with normal mood, affect, and cognition    Laboratory Values:   Recent Results (from the past 24 hour(s))   CBC W/O DIFF    Collection Time: 07/21/22  6:05 AM   Result Value Ref Range    WBC 5.3 3.6 - 11.0 K/uL    RBC 4.57 3.80 - 5.20 M/uL    HGB 15.0 11.5 - 16.0 g/dL    HCT 44.5 35.0 - 47.0 %    MCV 97.4 80.0 - 99.0 FL    MCH 32.8 26.0 - 34.0 PG    MCHC 33.7 30.0 - 36.5 g/dL    RDW 12.4 11.5 - 14.5 %    PLATELET 522 694 - 703 K/uL    MPV 11.8 8.9 - 12.9 FL    NRBC 0.0 0.0  WBC    ABSOLUTE NRBC 0.00 0.00 - 9.92 K/uL   METABOLIC PANEL, COMPREHENSIVE    Collection Time: 07/21/22  6:05 AM   Result Value Ref Range    Sodium 136 136 - 145 mmol/L    Potassium 4.0 3.5 - 5.1 mmol/L    Chloride 104 97 - 108 mmol/L    CO2 26 21 - 32 mmol/L    Anion gap 6 5 - 15 mmol/L    Glucose 98 65 - 100 mg/dL    BUN 8 6 - 20 mg/dL    Creatinine 0.56 0.55 - 1.02 mg/dL    BUN/Creatinine ratio 14 12 - 20      GFR est AA >60 >60 ml/min/1.73m2    GFR est non-AA >60 >60 ml/min/1.73m2    Calcium 10.8 (H) 8.5 - 10.1 mg/dL    Bilirubin, total 0.8 0.2 - 1.0 mg/dL    AST (SGOT) 13 (L) 15 - 37 U/L    ALT (SGPT) 20 12 - 78 U/L    Alk. phosphatase 76 45 - 117 U/L    Protein, total 7.0 6.4 - 8.2 g/dL    Albumin 3.8 3.5 - 5.0 g/dL    Globulin 3.2 2.0 - 4.0 g/dL    A-G Ratio 1.2 1.1 - 2.2         XR ABD (KUB)   Final Result   Persistently dilated small bowel in the left hemiabdomen, comparable to   yesterday's CT. CT ABD PELV WO CONT   Final Result   1. Partial small bowel obstruction with transition point in the left mid   abdomen as above. 2.  Free fluid in the pelvis. CT ABD PELV WO CONT   Final Result   Dilated, fluid-filled small bowel loops in the upper abdomen with swirling   mesentery and vessels in the left central abdomen, concerning for small bowel   obstruction due to internal hernia. Small amount of free fluid. No free air.            Assessment:  Problem List Items Addressed This Visit          Digestive    SBO (small bowel obstruction) ([facility] [address] - Primary        Plan:    Admission  Diet: progress to full liquids   IV fluids  SCD  IS  Pain medications  Nausea medication  Labs in AM  Will treat conservatively at this time and continue to monitor. 10. Plan discussed with patient and family and answered all their questions. Thank you for allowing me to participate in the care of this patient.

## 2022-07-22 PROCEDURE — 74011250637 HC RX REV CODE- 250/637: Performed by: FAMILY MEDICINE

## 2022-07-22 PROCEDURE — 74011250636 HC RX REV CODE- 250/636: Performed by: FAMILY MEDICINE

## 2022-07-22 PROCEDURE — 65270000029 HC RM PRIVATE

## 2022-07-22 PROCEDURE — 74011250636 HC RX REV CODE- 250/636: Performed by: SURGERY

## 2022-07-22 RX ADMIN — VENLAFAXINE HYDROCHLORIDE 37.5 MG: 37.5 CAPSULE, EXTENDED RELEASE ORAL at 09:07

## 2022-07-22 RX ADMIN — PANTOPRAZOLE SODIUM 20 MG: 20 TABLET, DELAYED RELEASE ORAL at 09:07

## 2022-07-22 RX ADMIN — HYDROMORPHONE HYDROCHLORIDE 1 MG: 1 INJECTION, SOLUTION INTRAMUSCULAR; INTRAVENOUS; SUBCUTANEOUS at 03:35

## 2022-07-22 RX ADMIN — ENOXAPARIN SODIUM 40 MG: 100 INJECTION SUBCUTANEOUS at 09:07

## 2022-07-22 RX ADMIN — SODIUM CHLORIDE 75 ML/HR: 9 INJECTION, SOLUTION INTRAVENOUS at 03:35

## 2022-07-22 RX ADMIN — HYDROMORPHONE HYDROCHLORIDE 1 MG: 1 INJECTION, SOLUTION INTRAMUSCULAR; INTRAVENOUS; SUBCUTANEOUS at 17:43

## 2022-07-22 RX ADMIN — AMLODIPINE BESYLATE 5 MG: 5 TABLET ORAL at 09:07

## 2022-07-22 RX ADMIN — SODIUM CHLORIDE 75 ML/HR: 9 INJECTION, SOLUTION INTRAVENOUS at 20:24

## 2022-07-22 RX ADMIN — GABAPENTIN 100 MG: 100 CAPSULE ORAL at 09:06

## 2022-07-22 RX ADMIN — HYDROMORPHONE HYDROCHLORIDE 1 MG: 1 INJECTION, SOLUTION INTRAMUSCULAR; INTRAVENOUS; SUBCUTANEOUS at 21:34

## 2022-07-22 NOTE — PROGRESS NOTES
PROGRESS NOTE    Patient: Kanchan Rivero MRN: [id number]  SSN: [id number]    YOB: 1970  Age: 45 y.o. Sex: female      Admit Date: 7/19/2022    LOS: 3 days       Subjective     Chief Complaint   Patient presents with    Abdominal Pain       HPI:   Patient is a 45y.o. year old female with a past medical history of hypertension and ovarian cancer who presents with abdominal pain for 4 days. She vomitted yesterday which prompted her to go to the ER. Her vomiting has since resolved. She reports that she is currently passing gas and had a BM yesterday. She has a surgical history of hysterectomy and oophorectomy in 2016. Dr. Audie Royal is her oncologist and she is currently in remission for her ovarian cancer. She was recently admitted 1 month ago for a SBO and did not have operative management at that time. She denies fever, chills, headache, chest pain, shortness of breast, urinary symptoms, hematochezia. CT ABD PELV WO CONT  IMPRESSION  Dilated, fluid-filled small bowel loops in the upper abdomen with swirling  mesentery and vessels in the left central abdomen, concerning for small bowel obstruction due to internal hernia. Small amount of free fluid. No free air.     7/20  Pt laying in bed, NAD. States she is feeling better. She is passing gas, no BM yesterday or today  Labs unremarkeable  Repeat CT ABD PELV WO CONT  IMPRESSION  1. Partial small bowel obstruction with transition point in the left mid abdomen as above. 2.  Free fluid in the pelvis. XR ABD (KUB)  IMPRESSION  Persistently dilated small bowel in the left hemiabdomen, comparable to yesterday's CT.    7/21  Bradycardia at 52  Labs unremarkeable  Some bloating today after eating broth  Feels better overall   Passing gas, No BM     7/22  Bradycardia  No labs today   Pt laying in bed comfortably, NAD. Passing gas, No BM      Review of Systems   Constitutional: Negative. HENT: Negative. Eyes: Negative.     Respiratory: Negative. Cardiovascular: Negative. Gastrointestinal:  Positive for abdominal pain. Genitourinary: Negative. Musculoskeletal: Negative. Skin: Negative. Neurological: Negative. Endo/Heme/Allergies: Negative. Psychiatric/Behavioral: Negative. Objective     Visit Vitals  /82   Pulse 73   Temp 97.8 °F (36.6 °C)   Resp 20   Ht 5' 8\" (1.727 m)   Wt 56.7 kg (125 lb)   SpO2 98%   BMI 19.01 kg/m²      O2 Device: None (Room air)    Physical Exam:   Physical Exam  Vitals reviewed. Constitutional:       Appearance: Normal appearance. She is normal weight. HENT:      Head: Normocephalic and atraumatic. Cardiovascular:      Rate and Rhythm: Normal rate and regular rhythm. Pulses: Normal pulses. Heart sounds: Normal heart sounds. Pulmonary:      Effort: Pulmonary effort is normal.      Breath sounds: Normal breath sounds. Abdominal:      General: Abdomen is flat. Bowel sounds are normal.      Palpations: Abdomen is soft. Tenderness: There is abdominal tenderness. Comments: Mid-line scar. Mild periumbilic and LLQ tenderness   Musculoskeletal:         General: Normal range of motion. Skin:     General: Skin is warm and dry. Neurological:      General: No focal deficit present. Mental Status: She is alert and oriented to person, place, and time. Intake & Output:  Current Shift: No intake/output data recorded. Last three shifts: 07/20 1901 - 07/22 0700  In: 1846.3 [P.O.:240; I.V.:1606.3]  Out: -     Lab/Data Review: All lab results for the last 24 hours reviewed. 24 Hour Results:    No results found for this or any previous visit (from the past 24 hour(s)). Imaging:    XR ABD (KUB)   Final Result   Persistently dilated small bowel in the left hemiabdomen, comparable to   yesterday's CT. CT ABD PELV WO CONT   Final Result   1. Partial small bowel obstruction with transition point in the left mid   abdomen as above.       2.  Free fluid in the pelvis. CT ABD PELV WO CONT   Final Result   Dilated, fluid-filled small bowel loops in the upper abdomen with swirling   mesentery and vessels in the left central abdomen, concerning for small bowel   obstruction due to internal hernia. Small amount of free fluid. No free air.                 Assessment   Small bowel obstruction  Hypertension  Ovarian cancer  GERD  Depression    KUB:  Persistently dilated small bowel in the left hemiabdomen, comparable to  yesterday's CT    Plan   Continue meds:  Amlodipine 5 mg po daily  Lovenox 40 mg subcu daily  Gabapentin 100 mg po daily  Protonix 20 mg po daily  Effexor XR 37.5 mg po daily     IV NS  Clear liquid diet, progress to full liquids per surgery   Lengthen time between narcotic pain meds as tolerated  Encourage activity  Miralax prn   Follow-up with surgery        Current Facility-Administered Medications:     sodium chloride (NS) flush 5-40 mL, 5-40 mL, IntraVENous, PRN, Dora Osorio MD    pantoprazole (PROTONIX) tablet 20 mg, 20 mg, Oral, DAILY, Dora Osorio MD, 20 mg at 07/21/22 0831    amLODIPine (NORVASC) tablet 5 mg, 5 mg, Oral, DAILY, Dora Osorio MD, 5 mg at 07/21/22 0831    gabapentin (NEURONTIN) capsule 100 mg, 100 mg, Oral, DAILY, Dora Osorio MD, 100 mg at 07/21/22 0831    venlafaxine-SR (EFFEXOR-XR) capsule 37.5 mg, 37.5 mg, Oral, DAILY, Peña Mcdonough MD, 37.5 mg at 07/21/22 0832    0.9% sodium chloride infusion, 75 mL/hr, IntraVENous, CONTINUOUS, Dora Osorio MD, Last Rate: 75 mL/hr at 07/22/22 0335, 75 mL/hr at 07/22/22 0335    acetaminophen (TYLENOL) tablet 650 mg, 650 mg, Oral, Q6H PRN, 650 mg at 07/21/22 0836 **OR** acetaminophen (TYLENOL) suppository 650 mg, 650 mg, Rectal, Q6H PRN, Dora Osorio MD    polyethylene glycol (MIRALAX) packet 17 g, 17 g, Oral, DAILY PRN, Dora Osorio MD, 17 g at 07/21/22 0120    ondansetron (ZOFRAN ODT) tablet 4 mg, 4 mg, Oral, Q8H PRN **OR** ondansetron (ZOFRAN) injection 4 mg, 4 mg, IntraVENous, Q6H PRN, Dora Osorio MD, 4 mg at 07/20/22 0500    enoxaparin (LOVENOX) injection 40 mg, 40 mg, SubCUTAneous, DAILY, Dora Osorio MD, 40 mg at 07/21/22 0830    HYDROmorphone (DILAUDID) injection 1 mg, 1 mg, IntraVENous, Q3H PRN, Jarrod Delgado MD, 1 mg at 07/22/22 9951    Current Outpatient Medications   Medication Instructions    amLODIPine (NORVASC) 5 mg tablet 1 Tablet, Oral, DAILY    gabapentin (NEURONTIN) 100 mg, Oral, DAILY    multivitamin (ONE A DAY) tablet 1 Tablet, DAILY    omeprazole (PRILOSEC) 20 mg, Oral, DAILY    ondansetron hcl (ZOFRAN) 4 mg, Oral, EVERY 8 HOURS AS NEEDED    venlafaxine-SR (EFFEXOR-XR) 37.5 mg, Oral, DAILY         Signed By: Vic Soliman     July 22, 2022

## 2022-07-22 NOTE — PROGRESS NOTES
Spiritual Care Assessment/Progress Note  [facility] [place]      NAME: Haily Olivarez      MRN: [id number]  AGE: 45 y.o.  SEX: female  Episcopal Affiliation: No preference   Language: English     7/22/2022     Total Time (in minutes): 15     Spiritual Assessment begun in [address] through conversation with:         [x]Patient        [] Family    [] Friend(s)        Reason for Consult: Initial/Spiritual assessment, patient floor     Spiritual beliefs: (Please include comment if needed)     [x] Identifies with a sheba tradition:         [] Supported by a sheba community:            [] Claims no spiritual orientation:           [] Seeking spiritual identity:                [] Adheres to an individual form of spirituality:           [] Not able to assess:                           Identified resources for coping:      [x] Prayer                               [] Music                  [] Guided Imagery     [x] Family/friends                 [] Pet visits     [] Devotional reading                         [] Unknown     [] Other:                                               Interventions offered during this visit: (See comments for more details)    Patient Interventions: Affirmation of emotions/emotional suffering, Affirmation of sheba, Bridging, Catharsis/review of pertinent events in supportive environment, Coping skills reviewed/reinforced, Iconic (affirming the presence of God/Higher Power), Life review/legacy, Initial/Spiritual assessment, patient floor, Normalization of emotional/spiritual concerns, Prayer (actual)           Plan of Care:     [] Support spiritual and/or cultural needs    [] Support AMD and/or advance care planning process      [] Support grieving process   [] Coordinate Rites and/or Rituals    [] Coordination with community clergy   [] No spiritual needs identified at this time   [] Detailed Plan of Care below (See Comments)  [] Make referral to Music Therapy  [] Make referral to Pet Therapy [] Make referral to Addiction services  [] Make referral to [facility]  [] Make referral to Spiritual Care Partner  [] No future visits requested        [x] Contact Spiritual Care for further referrals     Comments:  Visited patient in [address] [facility] for initial assessment. Patient was alone during the visit. Patient shared about her condition and future care plans. Stated she has good support of family as well as those who are dependent on her and miss her at this time. Share about Sikh though she has not been able to attend regularly. 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 Provided supportive presence while listening with empathy and reflection. Normalized her experiences while affirming her health care needs, familial support and spiritual resources. Offered and provided prayer per request.  Advised of  availability. Contact chaplains for further referrals. Chaplain Suyapa Duckworth M.Div.    can be reached by calling the  at [facility]  [phone]

## 2022-07-22 NOTE — PROGRESS NOTES
Problem: Patient Education: Go to Patient Education Activity  Goal: Patient/Family Education  Outcome: Progressing Towards Goal     Problem: Small Bowel Obstruction: Day 1  Goal: Activity/Safety  Outcome: Progressing Towards Goal     Problem: Hypertension  Goal: *Blood pressure within specified parameters  Outcome: Progressing Towards Goal     Problem: Patient Education: Go to Patient Education Activity  Goal: Patient/Family Education  Outcome: Progressing Towards Goal     Problem: Discharge Planning  Goal: *Discharge to safe environment  Outcome: Progressing Towards Goal     Problem: Patient Education: Go to Patient Education Activity  Goal: Patient/Family Education  Outcome: Progressing Towards Goal

## 2022-07-22 NOTE — PROGRESS NOTES
CM reviewed chart. Patient currently advancing diet and need to tolerate. No needs from case management at this time.     DC plan home self care

## 2022-07-22 NOTE — PROGRESS NOTES
Chief Complaint: Abdominal pain      Subjective:  Ms. Elicia Stratton is a 45y.o. year old * female with history of ovarian cancer S/P TAHBSO, previously admitted from 06/04/22-06/12/22 and was discharged home after improvement with conservative management who was readmitted for abdominal pain concerning for recurrent SBO. Patient reports feeling improved compared to yesterday. Having less abdominal pain. She has been able to tolerate full liquids without nausea or vomiting. Is passing a lot flatus and belching but no Bms. No fevers or chills. Review of Systems:   Constitutional:  no fever, no chills,  no sweats, No weakness, No fatigue, No decreased activity. Respiratory: No shortness of breath, No cough, No sputum production, No hemoptysis, No wheezing, No cyanosis. Cardiovascular: No chest pain, No palpitations, No bradycardia, No tachycardia, No peripheral edema, No syncope. Gastrointestinal: No nausea, No vomiting, No diarrhea, No constipation, No heartburn, Abdominal pain. Genitourinary: No dysuria, No hematuria, No change in urine stream, No urethral discharge, No lesions. Hematology/Lymphatics: No bruising tendency, No bleeding tendency, No petechiae, No swollen lymph glands. Endocrine: No excessive thirst, No polyuria, No cold intolerance, No heat intolerance, No excessive hunger. Musculoskeletal: No back pain, No neck pain, No joint pain, No muscle pain, No claudication, No decreased range of motion, No trauma. Integumentary: No rash, No pruritus, No abrasions. Neurologic: Alert and oriented X4, No abnormal balance, No headache, No confusion, No numbness, No tingling. Psychiatric: No anxiety, No depression, No matt. Physical Exam:     Vitals & Measurements:     Wt Readings from Last 3 Encounters:   07/19/22 56.7 kg (125 lb)   06/12/22 62.6 kg (138 lb 0.1 oz)   06/01/22 61.2 kg (135 lb)     Temp Readings from Last 3 Encounters:   07/22/22 98.1 °F (36.7 °C)   06/12/22 98.2 °F (36.8 °C)   06/01/22 98 °F (36.7 °C)     BP Readings from Last 3 Encounters:   07/22/22 (!) 118/94   06/11/22 138/88   06/01/22 116/86     Pulse Readings from Last 3 Encounters:   07/22/22 80   06/12/22 71   06/01/22 86      Ht Readings from Last 3 Encounters:   07/22/22 5' 8\" (1.727 m)   06/09/22 5' 7\" (1.702 m)   06/01/22 5' 7\" (1.702 m)      Date 07/21/22 0700 - 07/22/22 0659 07/22/22 0700 - 07/23/22 0659   Shift 1706-8489 5863-3962 24 Hour Total 7153-8543 4660-5829 24 Hour Total   INTAKE   P.O.  240 240        P. O.  240 240      I. V.(mL/kg/hr) 153.8(0.2)  153.8(0.1)        Volume (0.9% sodium chloride infusion) 153.8  153.8      Shift Total(mL/kg) 153.8(2.7) 240(4.2) 393.8(6.9)      OUTPUT   Urine(mL/kg/hr)           Urine Occurrence(s) 2 x 4 x 6 x      Emesis/NG output           Emesis Occurrence(s)  0 x 0 x      Stool           Stool Occurrence(s) 1 x 0 x 1 x      Shift Total(mL/kg)         .8 240 393.8      Weight (kg) 56.7 56.7 56.7 56.7 56.7 56.7         General: well appearing, no acute distress  Head: Normal  Face: Nornal  HEENT: atraumatic, PERRLA, moist mucosa, normal pharynx, normal tonsils and adenoids, normal tongue, no fluid in sinuses  Neck: Trachea midline, no carotid bruit, no masses  Chest: Normal.  Respiratory: normal chest wall expansion, CTA B, no r/r/w, no rubs  Cardiovascular: RRR, no m/r/g, Normal S1 and S2  Abdomen: Soft, mild lower abdominal tenderness, non-distended, normal bowel sounds in all quadrants, no hepatosplenomegaly, no tympany. Incision scar: well healed long midline surgical incision. Genitourinary: No inguinal hernia, normal external gentalia, no renal angle tenderness  Rectal: deferred  Musculoskeletal: Normal ROM in upper and lower extremities, No joint swelling.   Integumentary: Warm, dry, and pink, with no rash, purpura, or petechia  Heme/Lymph: No lymphadenopathy, no bruises  Neurological: Cranial Nerves II-XII grossly intact, No gross sensory or motor deficit. Psychiatric: Cooperative with normal mood, affect, and cognition    Laboratory Values:   No results found for this or any previous visit (from the past 24 hour(s)). XR ABD (KUB)   Final Result   Persistently dilated small bowel in the left hemiabdomen, comparable to   yesterday's CT. CT ABD PELV WO CONT   Final Result   1. Partial small bowel obstruction with transition point in the left mid   abdomen as above. 2.  Free fluid in the pelvis. CT ABD PELV WO CONT   Final Result   Dilated, fluid-filled small bowel loops in the upper abdomen with swirling   mesentery and vessels in the left central abdomen, concerning for small bowel   obstruction due to internal hernia. Small amount of free fluid. No free air. Assessment:  Problem List Items Addressed This Visit          Digestive    SBO (small bowel obstruction) ([address] - Primary        Plan:    Admission  Diet: progress to soft diet   IV fluids  SCD  IS  Pain medications  Nausea medication  Labs in AM  Will treat conservatively at this time and continue to monitor. 10. Plan discussed with patient and family and answered all their questions. Thank you for allowing me to participate in the care of this patient.

## 2022-07-22 NOTE — PROGRESS NOTES
PROGRESS NOTE    Patient: Addison Nolan MRN: [id number]  SSN: [id number]    YOB: 1970  Age: 45 y.o. Sex: female      Admit Date: 7/19/2022    LOS: 3 days       Subjective     Chief Complaint   Patient presents with    Abdominal Pain       HPI:   Patient is a 45y.o. year old female with a past medical history of hypertension and ovarian cancer who presents with abdominal pain for 4 days. She vomitted yesterday which prompted her to go to the ER. Her vomiting has since resolved. She reports that she is currently passing gas and had a BM yesterday. She has a surgical history of hysterectomy and oophorectomy in 2016. Dr. Hussain Cortes is her oncologist and she is currently in remission for her ovarian cancer. She was recently admitted 1 month ago for a SBO and did not have operative management at that time. She denies fever, chills, headache, chest pain, shortness of breast, urinary symptoms, hematochezia. CT ABD PELV WO CONT  IMPRESSION  Dilated, fluid-filled small bowel loops in the upper abdomen with swirling  mesentery and vessels in the left central abdomen, concerning for small bowel obstruction due to internal hernia. Small amount of free fluid. No free air.     7/20  Pt laying in bed, NAD. States she is feeling better. She is passing gas, no BM yesterday or today  Labs unremarkeable  Repeat CT ABD PELV WO CONT  IMPRESSION  1. Partial small bowel obstruction with transition point in the left mid abdomen as above. 2.  Free fluid in the pelvis. XR ABD (KUB)  IMPRESSION  Persistently dilated small bowel in the left hemiabdomen, comparable to yesterday's CT.    7/21  Bradycardia at 52  Labs unremarkeable  Some bloating today after eating broth  Feels better overall   Passing gas, No BM     7/22  Bradycardia  No labs today   Pt laying in bed comfortably, NAD. Passing gas, No BM      Review of Systems   Constitutional: Negative. HENT: Negative. Eyes: Negative.     Respiratory: Negative. Cardiovascular: Negative. Gastrointestinal:  Positive for abdominal pain. Genitourinary: Negative. Musculoskeletal: Negative. Skin: Negative. Neurological: Negative. Endo/Heme/Allergies: Negative. Psychiatric/Behavioral: Negative. Objective     Visit Vitals  BP (!) 153/93   Pulse (!) 54   Temp 98.1 °F (36.7 °C)   Resp 20   Ht 5' 8\" (1.727 m)   Wt 56.7 kg (125 lb)   SpO2 100%   BMI 19.01 kg/m²      O2 Device: None (Room air)    Physical Exam:   Physical Exam  Vitals reviewed. Constitutional:       Appearance: Normal appearance. She is normal weight. HENT:      Head: Normocephalic and atraumatic. Cardiovascular:      Rate and Rhythm: Normal rate and regular rhythm. Pulses: Normal pulses. Heart sounds: Normal heart sounds. Pulmonary:      Effort: Pulmonary effort is normal.      Breath sounds: Normal breath sounds. Abdominal:      General: Abdomen is flat. Bowel sounds are normal.      Palpations: Abdomen is soft. Tenderness: There is abdominal tenderness. Comments: Mid-line scar. Mild periumbilic and LLQ tenderness   Musculoskeletal:         General: Normal range of motion. Skin:     General: Skin is warm and dry. Neurological:      General: No focal deficit present. Mental Status: She is alert and oriented to person, place, and time. Intake & Output:  Current Shift: No intake/output data recorded. Last three shifts: 07/20 1901 - 07/22 0700  In: 1846.3 [P.O.:240; I.V.:1606.3]  Out: -     Lab/Data Review: All lab results for the last 24 hours reviewed. 24 Hour Results:    No results found for this or any previous visit (from the past 24 hour(s)). Imaging:    XR ABD (KUB)   Final Result   Persistently dilated small bowel in the left hemiabdomen, comparable to   yesterday's CT. CT ABD PELV WO CONT   Final Result   1. Partial small bowel obstruction with transition point in the left mid   abdomen as above. 2.  Free fluid in the pelvis. CT ABD PELV WO CONT   Final Result   Dilated, fluid-filled small bowel loops in the upper abdomen with swirling   mesentery and vessels in the left central abdomen, concerning for small bowel   obstruction due to internal hernia. Small amount of free fluid. No free air.                 Assessment   Small bowel obstruction  Hypertension  Ovarian cancer  GERD  Depression    KUB:  Persistently dilated small bowel in the left hemiabdomen, comparable to  yesterday's CT    Plan   Continue meds:  Amlodipine 5 mg po daily  Lovenox 40 mg subcu daily  Gabapentin 100 mg po daily  Protonix 20 mg po daily  Effexor XR 37.5 mg po daily     IV NS  Clear liquid diet, progress to full liquids per surgery   Lengthen time between narcotic pain meds as tolerated  Encourage activity  Miralax prn   Follow-up with surgery        Current Facility-Administered Medications:     sodium chloride (NS) flush 5-40 mL, 5-40 mL, IntraVENous, PRN, Dora Osorio MD    pantoprazole (PROTONIX) tablet 20 mg, 20 mg, Oral, DAILY, Dora Osorio MD, 20 mg at 07/22/22 8266    amLODIPine (NORVASC) tablet 5 mg, 5 mg, Oral, DAILY, Dora Osorio MD, 5 mg at 07/22/22 0221    gabapentin (NEURONTIN) capsule 100 mg, 100 mg, Oral, DAILY, Dora Osorio MD, 100 mg at 07/22/22 4035    venlafaxine-SR (EFFEXOR-XR) capsule 37.5 mg, 37.5 mg, Oral, DAILY, Mitchel Lopes MD, 37.5 mg at 07/22/22 0907    0.9% sodium chloride infusion, 75 mL/hr, IntraVENous, CONTINUOUS, Dora Osorio MD, Last Rate: 75 mL/hr at 07/22/22 0335, 75 mL/hr at 07/22/22 0335    acetaminophen (TYLENOL) tablet 650 mg, 650 mg, Oral, Q6H PRN, 650 mg at 07/21/22 0836 **OR** acetaminophen (TYLENOL) suppository 650 mg, 650 mg, Rectal, Q6H PRN, Dora Osorio MD    polyethylene glycol (MIRALAX) packet 17 g, 17 g, Oral, DAILY PRN, Dora Osorio MD, 17 g at 07/21/22 0120    ondansetron (ZOFRAN ODT) tablet 4 mg, 4 mg, Oral, Q8H PRN **OR** [facility]etron [facility]) injection 4 mg, 4 mg, IntraVENous, Q6H PRN, Dora Osorio MD, 4 mg at 07/20/22 0500    enoxaparin (LOVENOX) injection 40 mg, 40 mg, SubCUTAneous, DAILY, Dora Osorio MD, 40 mg at 07/22/22 3262    HYDROmorphone (DILAUDID) injection 1 mg, 1 mg, IntraVENous, Q3H PRN, Kelvin Delgado MD, 1 mg at 07/22/22 1628    Current Outpatient Medications   Medication Instructions    amLODIPine (NORVASC) 5 mg tablet 1 Tablet, Oral, DAILY    gabapentin (NEURONTIN) 100 mg, Oral, DAILY    multivitamin (ONE A DAY) tablet 1 Tablet, DAILY    omeprazole (PRILOSEC) 20 mg, Oral, DAILY    ondansetron hcl (ZOFRAN) 4 mg, Oral, EVERY 8 HOURS AS NEEDED    venlafaxine-SR (EFFEXOR-XR) 37.5 mg, Oral, DAILY         Signed By: Benjamin Finch MD     July 22, 2022

## 2022-07-22 NOTE — PROGRESS NOTES
Comprehensive Nutrition Assessment    Type and Reason for Visit: Initial, Reassess (MST 2)    Nutrition Recommendations/Plan:   Continue Full Liquid diet, advance to goal of [facility]. Ensure HP x3/d (480 kcal, 48 gm PRO). Nutrition ed on [facility] as appropriate. Please document as % PO and ONS intake in I/Os. Malnutrition Assessment:  Malnutrition Status:  Severe malnutrition (07/22/22 1146)    Context:  Acute illness     Findings of the 6 clinical characteristics of malnutrition:   Energy Intake:  50% or less of est energy requirements for 5 or more days  Weight Loss:  Greater than 5% over 1 month     Body Fat Loss:  No significant body fat loss,     Muscle Mass Loss:  No significant muscle mass loss,    Fluid Accumulation:  No significant fluid accumulation,     Strength:  Not performed     Nutrition Assessment:    Admitted for abdominal pain x4 days, vomiting. Pt familiar to RD during last admission, prev on PPN. CT finding of partial SBO. Endorsed poor PO intake and wt loss PTA. FOV=079# 1 month ago, UTO CBW d/t scale error; stated wt as 125# now. Wt loss stated as r/t poor PO intake after last discharged d/t unaware of foods appropriate to eat per handout provided(No Fiber, SB6 diet). Fair intake today of Clear Liq diet(>50%); advanced to Full Liq. RD to provide nutrition ed on [facility] prior to d/c home. Will add ONS. Labs: Ca 10.8, AST 13. Meds: NS @ 75 mL/hr, PPI, norvasc, dilaudid. Nutrition Related Findings:    NFPE w/o acute findings; nourished. No N/V/D nor c/s issues reported. H/o +C continues; last BM 7/18. No edema. Wound Type: None    Current Nutrition Intake & Therapies:  Average Meal Intake: 51-75%  Average Supplement Intake: None ordered  ADULT DIET Full Liquid    Anthropometric Measures:  Height: 5' 8\" (172.7 cm)  Ideal Body Weight (IBW): 140 lbs (64 kg)  Current Body Wt:  56.7 kg (125 lb), 89.3 % IBW.  Not specified  Current BMI (kg/m2): 19  Usual Body Weight: 62.9 kg (138 lb 10.7 oz) (1 month ago per chart review.)  % Weight Change (Calculated): -9.9  Weight Adjustment: No adjustment  BMI Category: Normal weight (BMI 18.5-24. 9)    Estimated Daily Nutrient Needs:  Energy Requirements Based On: Kcal/kg  Weight Used for Energy Requirements: Current  Energy (kcal/day): 1418 kcal/d  Weight Used for Protein Requirements: Current  Protein (g/day): 63 gm/d  Method Used for Fluid Requirements: 1 ml/kcal  Fluid (ml/day): 1450 mL/d    Nutrition Diagnosis:   Severe malnutrition, In context of acute illness or injury related to altered GI function, food and nutrition related knowledge deficit as evidenced by NPO or clear liquid status due to medical condition, intake 51-75%, poor intake prior to admission, weight loss greater than or equal to 5% in 1 month, GI abnormality, constipation    Nutrition Interventions:   Food and/or Nutrient Delivery: Modify current diet, Start oral nutrition supplement  Nutrition Education/Counseling: Education needed ([facility] [place])  Coordination of Nutrition Care: Continue to monitor while inpatient  Plan of Care discussed with: Pt, RN    Goals:  Goals: PO intake 50% or greater, Meet at least 75% of estimated needs, within 7 days, other (specify)  Specify Other Goals: Wt maintenance of +/- 0.5 kg x7 days. Nutrition Monitoring and Evaluation:   Behavioral-Environmental Outcomes: None identified  Food/Nutrient Intake Outcomes: Diet advancement/tolerance, Food and nutrient intake, Supplement intake  Physical Signs/Symptoms Outcomes: Biochemical data, GI status, Meal time behavior, Weight, Constipation    Discharge Planning: Too soon to determine    Amy Infante RD  Contact: ext [phone] or [employer].

## 2022-07-22 NOTE — PROGRESS NOTES
Problem: Falls - Risk of  Goal: *Absence of Falls  Description: Document Noel Led Fall Risk and appropriate interventions in the flowsheet.   Outcome: Progressing Towards Goal  Note: Fall Risk Interventions:            Medication Interventions: Patient to call before getting OOB         History of Falls Interventions: Door open when patient unattended

## 2022-07-23 PROCEDURE — 74011000250 HC RX REV CODE- 250: Performed by: INTERNAL MEDICINE

## 2022-07-23 PROCEDURE — 65270000029 HC RM PRIVATE

## 2022-07-23 PROCEDURE — 74011250637 HC RX REV CODE- 250/637: Performed by: FAMILY MEDICINE

## 2022-07-23 PROCEDURE — 74011250637 HC RX REV CODE- 250/637: Performed by: INTERNAL MEDICINE

## 2022-07-23 PROCEDURE — 94762 N-INVAS EAR/PLS OXIMTRY CONT: CPT

## 2022-07-23 PROCEDURE — 74011250636 HC RX REV CODE- 250/636: Performed by: FAMILY MEDICINE

## 2022-07-23 PROCEDURE — 74011250636 HC RX REV CODE- 250/636: Performed by: SURGERY

## 2022-07-23 RX ADMIN — AMLODIPINE BESYLATE 5 MG: 5 TABLET ORAL at 08:55

## 2022-07-23 RX ADMIN — HYDROMORPHONE HYDROCHLORIDE 1 MG: 1 INJECTION, SOLUTION INTRAMUSCULAR; INTRAVENOUS; SUBCUTANEOUS at 14:22

## 2022-07-23 RX ADMIN — LIDOCAINE HYDROCHLORIDE 30 ML: 20 SOLUTION ORAL; TOPICAL at 18:34

## 2022-07-23 RX ADMIN — HYDROMORPHONE HYDROCHLORIDE 1 MG: 1 INJECTION, SOLUTION INTRAMUSCULAR; INTRAVENOUS; SUBCUTANEOUS at 08:56

## 2022-07-23 RX ADMIN — HYDROMORPHONE HYDROCHLORIDE 1 MG: 1 INJECTION, SOLUTION INTRAMUSCULAR; INTRAVENOUS; SUBCUTANEOUS at 01:37

## 2022-07-23 RX ADMIN — VENLAFAXINE HYDROCHLORIDE 37.5 MG: 37.5 CAPSULE, EXTENDED RELEASE ORAL at 08:55

## 2022-07-23 RX ADMIN — PANTOPRAZOLE SODIUM 20 MG: 20 TABLET, DELAYED RELEASE ORAL at 08:55

## 2022-07-23 RX ADMIN — HYDROMORPHONE HYDROCHLORIDE 1 MG: 1 INJECTION, SOLUTION INTRAMUSCULAR; INTRAVENOUS; SUBCUTANEOUS at 18:34

## 2022-07-23 RX ADMIN — ENOXAPARIN SODIUM 40 MG: 100 INJECTION SUBCUTANEOUS at 08:55

## 2022-07-23 RX ADMIN — ONDANSETRON 4 MG: 2 INJECTION INTRAMUSCULAR; INTRAVENOUS at 18:41

## 2022-07-23 RX ADMIN — POLYETHYLENE GLYCOL 3350 17 G: 17 POWDER, FOR SOLUTION ORAL at 08:56

## 2022-07-23 RX ADMIN — GABAPENTIN 100 MG: 100 CAPSULE ORAL at 08:55

## 2022-07-23 NOTE — PROGRESS NOTES
Chief Complaint: Abdominal pain      Subjective:  Ms. Wang Cervantes is a 45y.o. year old * female with history of ovarian cancer S/P TAHBSO, previously admitted from 06/04/22-06/12/22 and was discharged home after improvement with conservative management who was readmitted for abdominal pain concerning for recurrent SBO. Patient reports feeling improved compared to yesterday. Having less abdominal pain. She has been able to tolerate soft diet without nausea or vomiting. Passing flatus today but still no BMs. No fevers or chills. Review of Systems:   Constitutional:  no fever, no chills,  no sweats, No weakness, No fatigue, No decreased activity. Respiratory: No shortness of breath, No cough, No sputum production, No hemoptysis, No wheezing, No cyanosis. Cardiovascular: No chest pain, No palpitations, No bradycardia, No tachycardia, No peripheral edema, No syncope. Gastrointestinal: No nausea, No vomiting, No diarrhea, No constipation, No heartburn, no abdominal pain. Genitourinary: No dysuria, No hematuria, No change in urine stream, No urethral discharge, No lesions. Hematology/Lymphatics: No bruising tendency, No bleeding tendency, No petechiae, No swollen lymph glands. Endocrine: No excessive thirst, No polyuria, No cold intolerance, No heat intolerance, No excessive hunger. Musculoskeletal: No back pain, No neck pain, No joint pain, No muscle pain, No claudication, No decreased range of motion, No trauma. Integumentary: No rash, No pruritus, No abrasions. Neurologic: Alert and oriented X4, No abnormal balance, No headache, No confusion, No numbness, No tingling. Psychiatric: No anxiety, No depression, No matt. Physical Exam:     Vitals & Measurements:     Wt Readings from Last 3 Encounters:   07/19/22 56.7 kg (125 lb)   06/12/22 62.6 kg (138 lb 0.1 oz)   06/01/22 61.2 kg (135 lb)     Temp Readings from Last 3 Encounters:   07/23/22 97.7 °F (36.5 °C)   06/12/22 98.2 °F (36.8 °C) 06/01/22 98 °F (36.7 °C)     BP Readings from Last 3 Encounters:   07/23/22 (!) 147/92   06/11/22 138/88   06/01/22 116/86     Pulse Readings from Last 3 Encounters:   07/23/22 (!) 57   06/12/22 71   06/01/22 86      Ht Readings from Last 3 Encounters:   07/22/22 5' 8\" (1.727 m)   06/09/22 5' 7\" (1.702 m)   06/01/22 5' 7\" (1.702 m)      Date 07/22/22 0700 - 07/23/22 0659 07/23/22 0700 - 07/24/22 0659   Shift 5389-9598 0681-5317 24 Hour Total 7115-7257 4285-5406 24 Hour Total   INTAKE   P.O.  300 300        P. O.  300 300      Shift Total(mL/kg)  300(5.3) 300(5.3)      OUTPUT   Urine(mL/kg/hr)  400(0.6) 400(0.3)        Urine Voided  400 400      Shift Total(mL/kg)  400(7.1) 400(7.1)      NET  -100 -100      Weight (kg) 56.7 56.7 56.7 56.7 56.7 56.7         General: well appearing, no acute distress  Head: Normal  Face: Nornal  HEENT: atraumatic, PERRLA, moist mucosa, normal pharynx, normal tonsils and adenoids, normal tongue, no fluid in sinuses  Neck: Trachea midline, no carotid bruit, no masses  Chest: Normal.  Respiratory: normal chest wall expansion, CTA B, no r/r/w, no rubs  Cardiovascular: RRR, no m/r/g, Normal S1 and S2  Abdomen: Soft, nontender, non-distended, normal bowel sounds in all quadrants, no hepatosplenomegaly, no tympany. Incision scar: well healed long midline surgical incision. Genitourinary: No inguinal hernia, normal external gentalia, no renal angle tenderness  Rectal: deferred  Musculoskeletal: Normal ROM in upper and lower extremities, No joint swelling. Integumentary: Warm, dry, and pink, with no rash, purpura, or petechia  Heme/Lymph: No lymphadenopathy, no bruises  Neurological: Cranial Nerves II-XII grossly intact, No gross sensory or motor deficit. Psychiatric: Cooperative with normal mood, affect, and cognition    Laboratory Values:   No results found for this or any previous visit (from the past 24 hour(s)).       XR ABD (KUB)   Final Result   Persistently dilated small bowel in the left hemiabdomen, comparable to   yesterday's CT. CT ABD PELV WO CONT   Final Result   1. Partial small bowel obstruction with transition point in the left mid   abdomen as above. 2.  Free fluid in the pelvis. CT ABD PELV WO CONT   Final Result   Dilated, fluid-filled small bowel loops in the upper abdomen with swirling   mesentery and vessels in the left central abdomen, concerning for small bowel   obstruction due to internal hernia. Small amount of free fluid. No free air. Assessment:  Problem List Items Addressed This Visit          Digestive    SBO (small bowel obstruction) ([address] - Primary    SBO resolved    Plan:    Admission  Diet: soft diet   IV fluids  SCD  IS  Pain medications  Nausea medication  Labs in AM  D/C home per PCP  10. Plan discussed with patient and family and answered all their questions. Thank you for allowing me to participate in the care of this patient.

## 2022-07-23 NOTE — PROGRESS NOTES
Bedside shift change report given to SEEMA Russell (oncoming nurse) by Yousif Tomlin (offgoing nurse). Report included the following information SBAR.

## 2022-07-23 NOTE — PROGRESS NOTES
Progress Note    Patient: Ricardo Longo MRN: [id number]  SSN: [id number]    YOB: 1970  Age: 45 y.o. Sex: female      Admit Date: 7/19/2022    LOS: 4 days     Subjective:     Complains of abdominal pain but much improved ovarian cancer    Objective:     Vitals:    07/22/22 1534 07/22/22 2018 07/23/22 0259 07/23/22 0809   BP: (!) 118/94 (!) 148/89 (!) 146/93 (!) 147/92   Pulse: 80 (!) 53 (!) 52 (!) 57   Resp: 20 18 17 16   Temp: 98.1 °F (36.7 °C) 98 °F (36.7 °C) 97.9 °F (36.6 °C) 97.7 °F (36.5 °C)   SpO2: 100% 100% 100% 100%   Weight:       Height:            Intake and Output:  Current Shift: No intake/output data recorded. Last three shifts: 07/21 1901 - 07/23 0700  In: 5 [P.O.:540]  Out: 400 [Urine:400]    Physical Exam:   General:  Alert, cooperative, no distress, appears stated age. Eyes:  Conjunctivae/corneas clear. PERRL, EOMs intact. Fundi benign   Ears:  Normal TMs and external ear canals both ears. Nose: Nares normal. Septum midline. Mucosa normal. No drainage or sinus tenderness. Mouth/Throat: Lips, mucosa, and tongue normal. Teeth and gums normal.   Neck: Supple, symmetrical, trachea midline, no adenopathy, thyroid: no enlargment/tenderness/nodules, no carotid bruit and no JVD. Back:   Symmetric, no curvature. ROM normal. No CVA tenderness. Lungs:   Clear to auscultation bilaterally. Heart:  Regular rate and rhythm, S1, S2 normal, no murmur, click, rub or gallop. Abdomen:   Soft, non-tender. Bowel sounds normal. No masses,  No organomegaly. Extremities: Extremities normal, atraumatic, no cyanosis or edema. Pulses: 2+ and symmetric all extremities. Skin: Skin color, texture, turgor normal. No rashes or lesions   Lymph nodes: Cervical, supraclavicular, and axillary nodes normal.   Neurologic: CNII-XII intact. Normal strength, sensation and reflexes throughout. Lab/Data Review: All lab results for the last 24 hours reviewed.      No results found for this or any previous visit (from the past 24 hour(s)).       Assessment:     Active Problems:    SBO (small bowel obstruction) ([facility] [address] (6/4/2022)        Plan:     Continue with conservative management    Signed By: Trini Kelsey MD     July 23, 2022

## 2022-07-23 NOTE — PROGRESS NOTES
Problem: Falls - Risk of  Goal: *Absence of Falls  Description: Document Dominique Mukherjee Fall Risk and appropriate interventions in the flowsheet.   Outcome: Progressing Towards Goal  Note: Fall Risk Interventions:            Medication Interventions: Teach patient to arise slowly         History of Falls Interventions: Door open when patient unattended         Problem: Patient Education: Go to Patient Education Activity  Goal: Patient/Family Education  Outcome: Progressing Towards Goal     Problem: Small Bowel Obstruction: Day 1  Goal: Activity/Safety  Outcome: Progressing Towards Goal  Goal: Consults, if ordered  Outcome: Progressing Towards Goal  Goal: Diagnostic Test/Procedures  Outcome: Progressing Towards Goal  Goal: Nutrition/Diet  Outcome: Progressing Towards Goal  Goal: Medications  Outcome: Progressing Towards Goal  Goal: Respiratory  Outcome: Progressing Towards Goal  Goal: Treatments/Interventions/Procedures  Outcome: Progressing Towards Goal  Goal: Psychosocial  Outcome: Progressing Towards Goal     Problem: Small Bowel Obstruction - Non Surgical: Discharge Outcomes  Goal: *Hemodynamically stable  Outcome: Progressing Towards Goal  Goal: *Demonstrates independent activity or return to baseline  Outcome: Progressing Towards Goal  Goal: *Optimal pain control at patient's stated goal  Outcome: Progressing Towards Goal  Goal: *Verbalizes understanding and describes prescribed diet  Outcome: Progressing Towards Goal  Goal: *Tolerating diet  Outcome: Progressing Towards Goal  Goal: *Verbalizes name, dosage, time, side effects, and number of days to continue medications  Outcome: Progressing Towards Goal  Goal: *Anxiety reduced or absent  Outcome: Progressing Towards Goal  Goal: *Understands and describes signs and symptoms to report to providers(Stroke Metric)  Outcome: Progressing Towards Goal  Goal: *Describes follow-up/return visits to physicians  Outcome: Progressing Towards Goal  Goal: *Describes available resources and support systems  Outcome: Progressing Towards Goal  Goal: *Active bowel function  Outcome: Progressing Towards Goal     Problem: Pain  Goal: *Control of Pain  Outcome: Progressing Towards Goal

## 2022-07-24 VITALS
HEIGHT: 68 IN | OXYGEN SATURATION: 100 % | WEIGHT: 125 LBS | HEART RATE: 58 BPM | TEMPERATURE: 98.5 F | DIASTOLIC BLOOD PRESSURE: 85 MMHG | BODY MASS INDEX: 18.94 KG/M2 | SYSTOLIC BLOOD PRESSURE: 130 MMHG | RESPIRATION RATE: 16 BRPM

## 2022-07-24 PROCEDURE — 74011000250 HC RX REV CODE- 250: Performed by: INTERNAL MEDICINE

## 2022-07-24 PROCEDURE — 74011250637 HC RX REV CODE- 250/637: Performed by: FAMILY MEDICINE

## 2022-07-24 PROCEDURE — 74011250637 HC RX REV CODE- 250/637: Performed by: INTERNAL MEDICINE

## 2022-07-24 PROCEDURE — 74011250636 HC RX REV CODE- 250/636: Performed by: FAMILY MEDICINE

## 2022-07-24 RX ORDER — POLYETHYLENE GLYCOL 3350 17 G/17G
17 POWDER, FOR SOLUTION ORAL DAILY
Qty: 30 PACKET | Refills: 0 | Status: SHIPPED | OUTPATIENT
Start: 2022-07-24

## 2022-07-24 RX ADMIN — PANTOPRAZOLE SODIUM 20 MG: 20 TABLET, DELAYED RELEASE ORAL at 08:42

## 2022-07-24 RX ADMIN — GABAPENTIN 100 MG: 100 CAPSULE ORAL at 08:42

## 2022-07-24 RX ADMIN — VENLAFAXINE HYDROCHLORIDE 37.5 MG: 37.5 CAPSULE, EXTENDED RELEASE ORAL at 08:42

## 2022-07-24 RX ADMIN — LIDOCAINE HYDROCHLORIDE 30 ML: 20 SOLUTION ORAL; TOPICAL at 10:07

## 2022-07-24 RX ADMIN — ENOXAPARIN SODIUM 40 MG: 100 INJECTION SUBCUTANEOUS at 08:42

## 2022-07-24 RX ADMIN — AMLODIPINE BESYLATE 5 MG: 5 TABLET ORAL at 08:42

## 2022-07-24 NOTE — DISCHARGE SUMMARY
Discharge Summary     Patient: Gina Horan MRN: [id number]  SSN: [id number]    YOB: 1970  Age: 45 y.o. Sex: female       Admit Date: 7/19/2022    Discharge Date: 7/24/2022      Admission Diagnoses: SBO (small bowel obstruction) ([facility] [K56.609]    Discharge Diagnoses:   Problem List as of 7/24/2022 Never Reviewed            Codes Class Noted - Resolved    SBO (small bowel obstruction) ([facility] ICD-10-CM: T78.392  ICD-9-CM: 560.9  6/4/2022 - Present            Discharge Condition: Good    Hospital Course: 53-year-old patient history of ovarian cancer status post LAZ/BSO readmitted for recurrent SBO patient was seen by the surgeon and placed on conservative management CT of the abdomen shows partial small bowel obstruction with transition point in the left mid abdomen patient improved with benign abdomen passing flatus cleared for discharge by surgeon    Consults: General Surgery    Significant Diagnostic Studies: labs: No results found for this or any previous visit (from the past 24 hour(s)). XR ABD (KUB)   Final Result   Persistently dilated small bowel in the left hemiabdomen, comparable to   yesterday's CT. CT ABD PELV WO CONT   Final Result   1. Partial small bowel obstruction with transition point in the left mid   abdomen as above. 2.  Free fluid in the pelvis. CT ABD PELV WO CONT   Final Result   Dilated, fluid-filled small bowel loops in the upper abdomen with swirling   mesentery and vessels in the left central abdomen, concerning for small bowel   obstruction due to internal hernia. Small amount of free fluid. No free air. Disposition: home    Discharge Medications:   Current Discharge Medication List        START taking these medications    Details   polyethylene glycol (MIRALAX) 17 gram packet Take 1 Packet by mouth in the morning.   Qty: 30 Packet, Refills: 0           CONTINUE these medications which have NOT CHANGED    Details   amLODIPine (NORVASC) 5 mg tablet Take 1 Tablet by mouth daily. omeprazole (PRILOSEC) 20 mg capsule Take 1 Capsule by mouth daily. Qty: 10 Capsule, Refills: 0      gabapentin (NEURONTIN) 100 mg capsule Take 100 mg by mouth daily. venlafaxine-SR (EFFEXOR-XR) 37.5 mg capsule Take 37.5 mg by mouth daily. multivitamin (ONE A DAY) tablet Take 1 Tab by mouth daily.            STOP taking these medications       ondansetron hcl (ZOFRAN) 4 mg tablet Comments:   Reason for Stopping:               Activity: Activity as tolerated  Diet: Regular Diet  Wound Care: As directed    Follow-up Appointments   Procedures    FOLLOW UP VISIT Appointment in: 3 - 5 Days     Standing Status:   Standing     Number of Occurrences:   1     Order Specific Question:   Appointment in     Answer:   3 - 5 Days   45 minutes discharge time    Signed By: Aury Johnson MD     July 24, 2022

## 2022-07-24 NOTE — PROGRESS NOTES
Discharge plan of care/case management plan validated with provider discharge order. Patient discharged home. Patient was having pain, nausea and vomited once this morning. MD aware. Maloxx given. Maloxx helped with pain, nausea and vomiting. IV removed. Discharge summary given with no questions or concerns. Patient wheeled down safely.

## 2022-07-24 NOTE — PROGRESS NOTES
DC order noted. Chart reviewed. No intervention required for this [address]. Please call [phone] if status changes and assist is needed.

## 2022-07-24 NOTE — ROUTINE PROCESS
Bedside shift change report given to  [id number] RN (oncoming nurse) by Kaur Rivera RN (offgoing nurse). Report included the following information SBAR, Intake/Output, MAR, and Recent Results.

## 2022-07-25 PROBLEM — E43 SEVERE PROTEIN-CALORIE MALNUTRITION (HCC): Status: ACTIVE | Noted: 2022-07-22

## 2022-07-31 NOTE — PROGRESS NOTES
Physician Progress Note      PATIENT:               [place]  CSN #:                  [id number]  :                       1970  ADMIT DATE:       2022 4:26 AM  DISCH DATE:        2022 1:28 PM  RESPONDING  PROVIDER #:        Shelley Linares MD          QUERY TEXT:    Patient admitted with SBO. Noted documentation of depression in  H&P. Please document in progress notes and discharge summary if you are treating/evaluating the following: The medical record reflects the following:  Risk Factors: 45year old female, [facility] depression  Clinical Indicators:  H&P - Depression  Treatment: Effexor 37.5mg continued from home meds      Please email [email or web address] with any questions  Options provided:  -- Major depression, recurrent: mild  -- Major depression, recurrent: moderate  -- Major depression, recurrent:  severe without psychotic features  -- Major depression, recurrent: severe with psychotic features  -- Major depression, single episode: mild  -- Major depression, single episode: moderate  -- Major depression, single episode: severe without psychotic features  -- Major depression, single episode: severe with psychotic features  -- Major depression, single episode: unspecified  -- Other - I will add my own diagnosis  -- Disagree - Not applicable / Not valid  -- Disagree - Clinically unable to determine / Unknown  -- Refer to Clinical Documentation Reviewer    PROVIDER RESPONSE TEXT:    This patent has recurrent major depression, current episode moderate. Query created by:  Manish Carl on 2022 6:47 AM      Electronically signed by:  Shelley Linares MD 2022 11:12 AM